# Patient Record
Sex: FEMALE | Race: BLACK OR AFRICAN AMERICAN | NOT HISPANIC OR LATINO | Employment: PART TIME | URBAN - METROPOLITAN AREA
[De-identification: names, ages, dates, MRNs, and addresses within clinical notes are randomized per-mention and may not be internally consistent; named-entity substitution may affect disease eponyms.]

---
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
## 2021-06-27 ENCOUNTER — HEALTH MAINTENANCE LETTER (OUTPATIENT)
Age: 21
End: 2021-06-27

## 2021-07-05 ENCOUNTER — HOSPITAL ENCOUNTER (EMERGENCY)
Dept: EMERGENCY MEDICINE | Facility: HOSPITAL | Age: 21
Discharge: HOME | End: 2021-07-05

## 2021-07-05 DIAGNOSIS — J02.9 PHARYNGITIS, UNSPECIFIED ETIOLOGY: ICD-10-CM

## 2021-07-05 LAB
DEPRECATED S PYO AG THROAT QL EIA: NORMAL
GROUP A STREP BY PCR: NORMAL
MONOCYTES NFR BLD AUTO: NEGATIVE %

## 2021-07-05 ASSESSMENT — MIFFLIN-ST. JEOR: SCORE: 1507.04

## 2021-07-05 NOTE — ED TRIAGE NOTES
ED Triage Notes by Lizet Parsons RN at 7/5/2021 11:29 AM     Author: Lizet Parsons RN Service: -- Author Type: Registered Nurse    Filed: 7/5/2021 11:32 AM Date of Service: 7/5/2021 11:29 AM Status: Signed    : Lizet Parsons RN (Registered Nurse)       Patient presents here with tonsillar swelling that has occurred over the past 6 days. She was seen on Meghan 10 for another episode of swelling, and the results of her throat swab were lost. Inspection of the orophrynx reveal swollen tonsils, but are not touching. Airway opening is about 2cm.

## 2021-07-06 VITALS — HEIGHT: 64 IN | WEIGHT: 168 LBS | BODY MASS INDEX: 28.68 KG/M2

## 2021-07-06 LAB
C TRACH DNA SPEC QL NAA+PROBE: POSITIVE
N GONORRHOEA DNA SPEC QL NAA+PROBE: POSITIVE
SPEC DESCRIPTION: ABNORMAL
SPECIMEN DESCRIPTION: ABNORMAL

## 2021-07-06 NOTE — ED PROVIDER NOTES
ED Provider Notes by Tamar Díaz PA-C at 7/5/2021  1:31 PM     Author: Tamar Díaz PA-C Service: Emergency Medicine Author Type: Physician Assistant    Filed: 7/5/2021 10:30 PM Date of Service: 7/5/2021  1:31 PM Status: Signed    : Tamar Díaz PA-C (Physician Assistant)       EMERGENCY DEPARTMENT ENCOUNTER      NAME: Sudarshan Lane  AGE: 21 y.o. female  YOB: 2000  MRN: [id number]  EVALUATION DATE & TIME: 7/5/2021 12:45 PM    PCP: Provider, No Primary Care    ED PROVIDER: Tamar Díaz PA-C      Chief Complaint   Patient presents with   ? Sore Throat         FINAL IMPRESSION:  1. Pharyngitis, unspecified etiology          MEDICAL DECISION MAKING:    Pertinent Labs & Imaging studies reviewed. (See chart for details)  21 y.o. female presents to the Emergency Department for evaluation of sore throat x 6 days. Was seen in ED last month for similar symptoms. Was placed on amoxicillin, strep testing was lost by lab. Reports it was better until symptoms return almost one week ago. She denies any fevers, chills, vomiting, diarrhea, headache, change in chronic smokers cough. Denies concern for or history of STIs.    Vitals reviewed and unremarkable. Patient is non-toxic appearing and in no acute distress. On physical exam, lungs are CTAB. Posterior pharynx with 3+ and symmetric tonsils that are erythematous with cobblestone appearance. Uvula midline, no exudates, no submandibular tenderness or edema, no trismus. Tolerating own secretions without difficulty. Differential diagnosis includes but not limited to viral URI, mono, strep throat, chlamydia/gonococcal pharyngitis.     Rapid strep negative. Mono negative. Gonorrhea and chlamydia PCR pending from posterior pharynx given re-occurrence with negative strep and mono testing. With shared decision making, will not empirically treat. She was given solu-medrol with improvement. She is tolerating oral intake without difficulty.  No physical exam findings to suggest epiglottitis, PTA or RPA. Would not prescribe antibiotics at this time with suspect viral infection. Discussed return precautions and ENT follow up given re-occurring symptoms. Patient expressed understanding and discharged home in stable condition.     0 minutes of critical care time     ED COURSE  12:52PM I met with the patient, obtained history, performed an initial exam, and discussed options and plan for diagnostics and treatment here in the ED. PPE: Provider wore gloves and surgical mask.   2:07 PM Discussed results. Patient discharged after being provided with extensive anticipatory guidance and given return precautions, importance of PCP follow-up emphasized.    At the conclusion of the encounter I discussed the results of all of the tests and the disposition. The questions were answered. The patient acknowledged understanding and was agreeable with the care plan.     MEDICATIONS GIVEN IN THE EMERGENCY:  Medications   methylPREDNISolone sod suc(PF) injection 125 mg (Solu-MEDROL) (125 mg Intramuscular Given 7/5/21 1329)       NEW PRESCRIPTIONS STARTED AT TODAY'S ER VISIT  There are no discharge medications for this patient.         =================================================================    HPI    Patient information was obtained from: patient    Use of Interpretor: N/A         Sudarshan Lane is a 21 y.o. female with no pertinent history who presents to this ED as a walk in by self for evaluation of sore throat.    Per chart review, patient was seen at this ED on 6/10/21 for sore throat and diagnosed with pharyngitis. Rapid strep was obtained but lost by the lab. She was given 10 mg Decadron in the ED and discharged with a prescription for a 10-day course of amoxicillin.    Patient was evaluated here in June for similar symptoms. She was prescribed antibiotics and symptoms eventually improved. However, it has returned and she reports 6 days of sore  throat and tonsillar redness/swelling. She has been taking ibuprofen at home without relief. Reports chronic smokers cough that is unchanged. Patient is otherwise healthy and denies fever, chills, nausea, vomiting, diarrhea, or any other complaints at this time. No concerns for STIs. Denies chance of pregnancy and is currently on her menstrual cycle.     REVIEW OF SYSTEMS   Review of Systems   Constitutional: Negative for chills and fever.   HENT: Positive for sore throat. Negative for drooling, trouble swallowing and voice change.         Positive for tonsillar swelling and redness (bilaterally).   Respiratory: Positive for cough (chronic). Negative for shortness of breath.    Cardiovascular: Negative for chest pain.   Gastrointestinal: Negative for diarrhea, nausea and vomiting.   Skin: Negative for rash.   All other systems reviewed and are negative.         PAST MEDICAL HISTORY:  History reviewed. No pertinent past medical history.    PAST SURGICAL HISTORY:  History reviewed. No pertinent surgical history.        CURRENT MEDICATIONS:    No current facility-administered medications on file prior to encounter.      No current outpatient medications on file prior to encounter.       ALLERGIES:  No Known Allergies    FAMILY HISTORY:  History reviewed. No pertinent family history.    SOCIAL HISTORY:   Social History     Socioeconomic History   ? Marital status: Single     Spouse name: None   ? Number of children: None   ? Years of education: None   ? Highest education level: None   Occupational History   ? None   Social Needs   ? Financial resource strain: None   ? Food insecurity     Worry: None     Inability: None   ? Transportation needs     Medical: None     Non-medical: None   Tobacco Use   ? Smoking status: None   Substance and Sexual Activity   ? Alcohol use: None   ? Drug use: None   ? Sexual activity: None   Lifestyle   ? Physical activity     Days per week: None     Minutes per session: None   ? Stress:  "None   Relationships   ? Social connections     Talks on phone: None     Gets together: None     Attends [employer] service: None     Active member of club or organization: None     Attends meetings of clubs or organizations: None     Relationship status: None   ? Intimate partner violence     Fear of current or ex partner: None     Emotionally abused: None     Physically abused: None     Forced sexual activity: None   Other Topics Concern   ? None   Social History Narrative   ? None       VITALS:  Patient Vitals for the past 24 hrs:   BP Temp Temp src Pulse Resp SpO2 Height Weight   07/05/21 1127 109/68 98.5  F (36.9  C) Oral 87 16 99 % 5' 4\" (1.626 m) 168 lb (76.2 kg)       PHYSICAL EXAM    Constitutional: Well developed, Well nourished, NAD   HENT: Normocephalic, Atraumatic, Bilateral external ears normal, 3+ tonsillar edema bilaterally with cobblestone appearance, uvula midline, no exudates, no dysphonia, tolerating own secretions without difficulty, mucous membranes moist, No submandibular edema or tenderness. Nose normal.   Neck- Normal range of motion, No tenderness, Supple, No stridor. Bilateral tonsillar lymphadenopathy.   Eyes: Conjunctiva normal, No discharge.   Respiratory: Normal breath sounds, No respiratory distress, No wheezing, Speaks full sentences easily. No cough.   Cardiovascular: Normal heart rate, Regular rhythm, No murmurs, No rubs, No gallops.  GI: Soft, No tenderness, No masses, No flank tenderness. No rebound or guarding.    Musculoskeletal: 2+ DP pulses. No edema. No cyanosis, No clubbing. Moves all extremities spontaneously.  Integument: Warm, Dry, No erythema, No rash. No petechiae.    Neurologic: Alert & oriented x 3, Normal motor function, Normal sensory function, No focal deficits noted. Normal gait.    Psychiatric: Affect normal, Judgment normal, Mood normal. Cooperative.     LAB:  All pertinent labs reviewed and interpreted.  Results for orders placed or performed during the " hospital encounter of 07/05/21   Rapid Strep A Screen-Throat    Specimen: Throat   Result Value Ref Range    Rapid Strep A Antigen No Group A Strep detected, presumptive negative No Group A Strep detected, presumptive negative   Group A Strep PCR Throat Swab    Specimen: Throat   Result Value Ref Range    Group Strep A by PCR No Group A Strep detected No Group A Strep detected, Invalid, ERROR   Mononucleosis Screen   Result Value Ref Range    Mono Screen Negative Negative       IJalyn, am serving as a scribe to document services personally performed by Tamar Díaz PA-C based on my observation and the provider's statements to me. I, Tamar Díaz PA-C attest that Jalyn Pickard is acting in a scribe capacity, has observed my performance of the services and has documented them in accordance with my direction.    Tamar Díaz PA-C  Emergency Medicine  [facility]     Tamar Díaz PA-C  07/05/21 9078

## 2021-07-08 RX ORDER — DOXYCYCLINE 100 MG/1
100 CAPSULE ORAL 2 TIMES DAILY
Qty: 20 CAPSULE | Refills: 0 | Status: SHIPPED | OUTPATIENT
Start: 2021-07-08 | End: 2021-07-18

## 2021-10-17 ENCOUNTER — HEALTH MAINTENANCE LETTER (OUTPATIENT)
Age: 21
End: 2021-10-17

## 2022-03-07 ENCOUNTER — OFFICE VISIT (OUTPATIENT)
Dept: MIDWIFE SERVICES | Facility: CLINIC | Age: 22
End: 2022-03-07
Payer: COMMERCIAL

## 2022-03-07 VITALS
HEIGHT: 63 IN | WEIGHT: 174 LBS | SYSTOLIC BLOOD PRESSURE: 116 MMHG | HEART RATE: 72 BPM | DIASTOLIC BLOOD PRESSURE: 64 MMHG | BODY MASS INDEX: 30.83 KG/M2

## 2022-03-07 DIAGNOSIS — Z32.01 PREGNANCY EXAMINATION OR TEST, POSITIVE RESULT: Primary | ICD-10-CM

## 2022-03-07 DIAGNOSIS — N92.6 MISSED MENSES: ICD-10-CM

## 2022-03-07 LAB — HCG UR QL: POSITIVE

## 2022-03-07 PROCEDURE — 99203 OFFICE O/P NEW LOW 30 MIN: CPT | Performed by: ADVANCED PRACTICE MIDWIFE

## 2022-03-07 PROCEDURE — 81025 URINE PREGNANCY TEST: CPT | Performed by: ADVANCED PRACTICE MIDWIFE

## 2022-03-07 RX ORDER — PRENATAL VIT/IRON FUM/FOLIC AC 27MG-0.8MG
1 TABLET ORAL DAILY
Qty: 90 TABLET | Refills: 3 | Status: SHIPPED | OUTPATIENT
Start: 2022-03-07 | End: 2022-11-17

## 2022-03-07 NOTE — PROGRESS NOTES
Pregnancy Confirmation Visit:     Assessment:   Pregnancy confirmation  , 6w2d gestation by known LMP  Former cigarette smoker, quit with positive UPT    Plan:   1.) Discussed maternity care options at [facility]- philosophy, care models, and locations.  Patient desires to follow with the nurse midwives.  2.) Medical, surgical, family and obstetrical history reviewed.   3.) Anticipatory guidance given re: first trimester discomforts and relief measures. Nutrition principles in early pregnancy briefly discussed. Encouraged PNV with folic acid, vitamin D, and DHA supplements. First trimester warning signs reviewed.   4.) Dating ultrasound discussed. Accepts referral to [facility] to confirm dating.   5.) Pt encouraged to follow-up for IOB visit between 10 and 12 weeks.   6.) Recommended patient lift no more than 25 pounds.    40 minutes spent on the date of the encounter doing chart review, review of test results, patient visit and documentation     Subjective:   Sudarshan Fierro is a 22 year old, , here for pregnancy confirmation visit.  She is excited to be pregnant.  She and her partner were not necessarily trying but they were not preventing either. UPT was positive at home on 22.Patient's last menstrual period was 2022 (exact date). Last period was normal. Has had regular cycles [id number]. Current symptoms also include: nausea.   Questions today regarding lifting restrictions at work.   She currently lives with her sister and her niece.  Her partner is on duty [place]  She feels safe in her relationship.  When he returns from base they will plan to look for an apartment together.  Has a gun and she keeps it locked.   She recently quit smoking cigarettes.  Praised for these efforts!    Review of Systems  Denies bleeding, pain or cramping, abnormal vaginal discharge or dysuria.    Past Medical History:   Diagnosis Date     Chlamydia      Gonorrhea   "      Past Surgical History:   Procedure Laterality Date     MOUTH SURGERY         OB History    Para Term  AB Living   1 0 0 0 0 0   SAB IAB Ectopic Multiple Live Births   0 0 0 0 0      # Outcome Date GA Lbr Johnathan/2nd Weight Sex Delivery Anes PTL Lv   1 Current                Family History   Problem Relation Age of Onset     Diabetes Mother      Early Death Mother      Schizophrenia Mother      Diabetes Sister      Early Death Sister         Objective:    /64 (BP Location: Right arm, Patient Position: Sitting, Cuff Size: Adult Regular)   Pulse 72   Ht 1.607 m (5' 3.25\")   Wt 78.9 kg (174 lb)   LMP 2022 (Exact Date)   Breastfeeding No   BMI 30.58 kg/m     General: alert and no acute distress    Physical exam deferred until IOB visit    Lab Review  Urine HCG: positive    Re Nayak CNM, MARVIN, CAROLE  3/7/2022   10:08 AM                       "

## 2022-03-07 NOTE — PATIENT INSTRUCTIONS
"Welcome to [facility]   and thank you for choosing us for your maternity care provider!  Congratulations!    Meet the Midwives from [facility]  You are invited to an informational meet and greet with [facility]'s [facility] Certified Nurse-Midwives. Our free \"Meet the Midwives\" event is a great opportunity to learn about our midwives' philosophy and experience, the hospitals where we can assist with your birth, and answer questions you may have. Partners, friends, and family are welcome to attend. Currently, this is a virtual event.  Date  First Tuesday of every month at 7 pm.    Link to next (live) meeting  [email or web address]  To Join by Telephone (audio only) Call:   [phone] Phone Conference ID: [phone]#    Contact information:  Appointment line and to get a hold of CNM in clinic Monday-Friday 8 am - 5 pm:  [phone].  There are some clinics with early start times (1st appointment 7:40 am) and others with evening hours (last appointment 6:20 pm).  Most are typically open from 8 am to 5 pm.    CNM on call answering service: [phone].  Specify your hospital of choice and leave a brief message for CNM;  will then page CNM who is on call at your specified hospital and you should receive a call back with 15 minutes.  Be sure that your ringer is audible and that you can accept blocked calls so that we can get back in touch with you! This number should be reserved for urgent needs if during the day, before 8 am, after 5 pm, weekends, holidays.      Pregnancy: Body Changes  From conception (fertilization) until after the birth of your child, you and your baby will change every day. To help you understand what is happening, we ve outlined how pregnancy begins and some of the changes you may notice.  How Pregnancy Begins  Conception is the union of a sperm and an egg. " When it occurs, your baby s genetic makeup is complete, even its sex. Fertilization takes place in the fallopian tube. The fertilized egg then travels down this tube to the uterus (womb). The egg attaches to the lining of the uterus about a week later. There it grows and is nourished.    Your Changing Body  Pregnancy affects almost every part of your body. You may notice some of the following physical and emotional changes:    Your uterus expands outward and upward as your baby grows. You may feel pressure on your bladder, stomach, and other organs.    You may notice skin color changes on your forehead, nose, and cheeks. A dark line may form from your bellybutton down to your pubic area. The skin color around your nipples and thighs may also change.    Pink stretch marks may appear on your abdomen, breasts, or hips.    Your hair may seem thicker. You lose less hair during pregnancy.    You may feel fine one day and weepy the next. This is caused by changes in your body, such as increased hormones (chemicals that affect the function of certain organs and also your moods).      Adapting to Pregnancy: First Trimester  As your body adjusts, you may have to change or limit your daily activities. You ll need more rest. You may also need to use the energy you have more wisely.  Eat stomach-friendly foods like cottage cheese, crackers, or bread throughout the day.    Your Changing Body  Almost every part of your body is affected as you adapt to pregnancy. The uterus and cervix will begin to soften right away. You may not look very pregnant during the first three months. But you are likely to have some common signs of early pregnancy:    Nausea    Fatigue    Frequent urination    Mood swings    Bloating of the abdomen    Missed or light periods (first trimester bleeding)    Nipple or breast tenderness, breast swelling      It s Not Too Late to Start Good Habits  What matters most is protecting your baby from this moment on.  If you smoke, drink alcohol, or use drugs, now is the time to stop. If you need help, talk with your health care provider.    Smoking increases the risk of stillbirth or having a low-birth-weight baby. If you smoke, quit now.    Alcohol and drugs have been linked with miscarriage, birth defects, intellectual disability, and low birth weight. Do not drink alcohol or take drugs.    Tips to Relieve Nausea  Although nausea can occur at any time of the day, it may be worse in the morning. To help prevent nausea:    Eat small, light meals at frequent intervals.    Get up slowly. Eat a few unsalted crackers before you get out of bed.    Drink water with lemon slices.    Eat an ice pop in your favorite flavor.    Ask your health care provider about taking stew or vitamin B6 for nausea and vomiting.    Talk with your health care provider if you take vitamins that upset your stomach.    Work Concerns  The end of the first trimester is a good time to discuss working during pregnancy with your employer. Follow your health care provider s advice if your job requires you to stand for a long time, work with hazardous tools, or even sit at a desk all day. Your workspace, workload, or scheduled hours may need to be adjusted. Perhaps you can change body postures more often or take an extra break.  Advice for Travel  Talk to your health care provider first, but the second trimester may be the best time for any travel. You may be advised to avoid certain trips while you re pregnant. Food and water can be concerns in developing countries. Travel by car is a good choice, as you can stop, get out, and stretch. Bring snacks and water along. Fasten the lap belt below your belly, low over your hips. Also be sure to wear the shoulder harness.  Intimacy  Unless your health care provider tells you to, there is no reason to stop having sex while you re pregnant. You or your partner may notice changes in desire. Desire may be less in the first  trimester, due to nausea and fatigue. In the second trimester, sex may be very enjoyable. The third trimester can be a challenge comfort-wise. Try different positions and see what s best for you both.      Pregnancy: Your First Trimester Changes  The first trimester is a time of rapid development for your baby. Because your baby is growing so quickly, it is important that you start a healthy lifestyle right away. By the end of the first trimester, your baby has formed all of its major body organs and weighs just over an ounce.    Month 1 (Weeks 1-4)  The placenta (the organ that nourishes your baby) begins to form. The heart and lungs begin to develop. Your baby is about 1/4 inch long by the end of the first month.    Month 2 (Weeks 5-8)  All of your baby s major body organs form. The face, fingers, toes, ears, and eyes appear. By the end of the month, your baby is about 1 inch long.    Month 3 (Weeks 9-12)  Your baby can open and close its fists and mouth. The sexual organs begin to form. As the first trimester ends, your baby is about 4 inches long.      Pregnancy: Your Weight  Being a healthy weight is important for both you and your baby. The weight you gain now is not just extra fat. It is also the weight of your baby. And it is the increased blood and fluids to support the baby. A slow, steady rate of gain is best. How much you should gain depends on your weight before getting pregnant. Check with your health care provider to find out what is right for you.    If You Gain Too Much  Gaining too much weight might cause you to feel tired or you could have a harder pregnancy or birth. If you and your health care provider decide you re gaining too much:    Eat fewer fats and sugars. Instead, eat fruit, vegetables, and whole-grain foods.    Drink plenty of water between meals.    Get at least 20 minutes of light exercise, such as walking, each day.    Don t diet. You might not get enough of the nutrients you or your  baby needs.    Keep a diet diary to help you gauge what and how much you are eating .    If You re Not Gaining Enough  If you don t gain enough, your baby could be too small or have health problems. Women tend to gain most of their weight in the second and third trimesters. For now:    Eat many types of foods. Make sure you get enough calcium, protein, and carbohydrates.    Don t skip meals.    Eat healthy snacks.    Pick nutrient-dense, high calorie healthy food like trail mix or protein shakes.    See a dietitian for help.    Talk to your healthcare provider if you have had an eating disorder or problems with certain foods.      Pregnancy: Common Questions  There are plenty of myths and  old wives  tales  surrounding pregnancy. You may need help  fact from fiction. On this sheet, you ll find answers to a few common questions. If you have other questions, talk with a midwife.    Will Working Harm My Baby?  In most cases, working throughout your pregnancy is not harmful at all. There may be concerns if the job involves dangerous machinery or chemicals, lifting, or standing for very long periods of time. Talk to your health care provider and employer about your particular job and pregnancy.  Why Can t I Change the Cat Litter Box?  Cats carry a disease called toxoplasmosis. In adult humans, it shows up as a mild infection of the blood and organs. If you are infected during pregnancy, the baby s brain and eyes could be damaged. To be safe, have someone else change the litter. If you must handle it, wear a paper mask over your nose and mouth. Also, wear gloves and wash your hands afterward.  Which Medications Are Safe?  No prescription or over-the-counter drug is safe for everyone all of the time. But sometimes medications are needed. Be sure your health care provider knows you are pregnant. Then use only the medications he or she advises you to take. Please refer to the below resources for further information  and discuss concern and questions with your midwife.  Is It True That I Can Overheat My Baby?  Yes. To avoid making your baby too warm:    Don t sit in a jacuzzi. A long, warm bath is fine, but not in water over 100 F.    Exercise less intensely if you feel fatigued. Base your workout on how you feel, not your heart rate. Heart rates aren t a good way to measure effort during pregnancy.  Can I Lift and Carry Safely?  Yes, if your health care provider doesn t tell you otherwise. Learn to lift and carry safely to avoid injury and reduce back pain during pregnancy. To protect your back:    Bend at the knees to bring the load nearer.    Get a good . Test the weight of the load.    Tighten your abdomen. Exhale as you lift.    Lift with your legs, not with your back.    Carry the load close to your body.    Hold the load so you can see where you are going.  What If I Get Sick?  Most women get sick at least once during pregnancy. Talk with your health care provider if you do. Most likely it will not affect your pregnancy. Get plenty of rest and fluids, and eat what you can. Talk to your health care provider before taking any medications.        HEALTHY PREGNANCY CARE: 10-14 WEEKS PREGNANT     By weeks 10 to 14 of your pregnancy, the placenta has formed inside your uterus. It may be possible to hear your baby's heartbeat with a doppler ultrasound device. Your baby's eyes can and do move. The arms and legs can bend.    GENETIC SCREENING OPTIONS AT [facility]                All testing is optional. We don t recommend or discourage any test; it is totally up to you and your partner. Some couples wish to know their risk of having a baby with a genetic defect and others do not. We will support your decision. Abnormal results may lead to a discussion of options for further testing.    Accurate dating of your pregnancy is important for all testing so an ultrasound may be done prior to referral or testing.    No testing  provides certainty; there are false positives and negatives associated with all testing, some more than others.    Most genetic testing is non-invasive (requires only a blood sample and sometimes an ultrasound or both).    It is always wise to check with your insurance carrier before proceeding.    Some testing can be done at our lab and some require a referral.    If you decide to do no testing, the 20 week ultrasound scan, which is a routine or standard ultrasound, has some ability to detect abnormalities in the baby, and identifies obstetric problems.    If you are over 35, you will have the option of a Level II ultrasound, which is a more detailed and targeting scan, that helps detect fetal anomalies as well as obstetric problems.      If you have a more complex family history of chromosomal abnormalities, a referral to a genetic counselor and/or a Maternal Fetal Medicine specialist, to help identify available tests, may be recommended.    TYPES OF GENETIC TESTING AVAILABLE INCLUDE:    Carrier Screening/Testing for Genetic Conditions    There are many inherited conditions for which testing or carrier testing is available. Carrier screening can test for conditions like Cystic Fibrosis, Thalassemia, Sebastian Sachs, Sickle Cell, Hemophilia, Muscular Dystrophy, Kem s disease and many others.     Cystic Fibrosis (CF) affects both males and females and people from all racial and ethnic groups. However, the disease is most common among Caucasians of Northern  descent. CF is also common among Latinos and American Indians. The disease is less common among  Americans and  Americans. More than 10 million Americans are carriers of a faulty CF gene and many of them don't know that they are CF carriers. One or both parents can be tested any time before or during pregnancy.    Talk to your care provider about your family history and whether you should be screened. A referral to a genetic counselor at  [facility] or [facility] can be made by your care provider at any time.    This is also tested for in the  Metabolic Screen that your infant receives 24 hours after birth.     Fetal DNA cell Testing: Atkins or Innatal (Non-Invasive Prenatal Testing)    At 10 wk or greater, a blood sample can be drawn here at clinic or at any of our referral offices. It will provide highly accurate results with low false positive rates for trisomy 18, trisomy 21 (Down Syndrome), and trisomy 13 (> 99% trisomy detection rate at a false positive rate of <0.1%). Gender identification can also be obtained if desired (98% accuracy).  Can also detect some sex-linked chromosomal abnormalities (80-90% accuracy).  This is often done if one of the other screening tests is abnormal.        1st Trimester Screening:     Everyone has an age related risk of having a baby with a genetic abnormality. This testing provides a risk profile which is better than assigning risk based solely on your age.    Refines your risk of having a baby with a chromosomal abnormality such as Trisomy 21 & 18.    This test with detect a fetus with one of these disorders about 85% of the time.    A thickened nuchal fold can also be associated with cardiac defects.    This test requires a blood collection combined with ultrasound to obtain a measurement of fluid at back of baby s neck (nuchal translucency).    False positive results occur approximately 5% of cases.    An additional blood sample may be necessary in order to calculate your risk of having a baby with a neural tube defect (e.g. spina bifida).  This is called the AFP test (alpha fetal protein).  An ultrasound should be able to  any spinal defect as well.    Follow-up of an abnormal test may include a more extensive ultrasound study and you may be offered an amniocentesis (a small sample of amniotic fluid is withdrawn and studied) for a definitive diagnosis    Quad Screen  (4-marker screen)    Between 15 and 21 weeks, a sample of blood can be drawn at our lab to assess your risk of having a baby with Down Syndrome, Trisomy 18 and neural tube defects. Such testing is able to detect these conditions in 80% of cases and the false-positive rate is approximately 5%.     Follow-up of an abnormal test may include a more extensive ultrasound study and you may be offered an amniocentesis (a small sample of amniotic fluid is withdrawn and studied) for a definitive diagnosis      Referrals opportunities include:    [facility],  [facility], [facility]  o Offers nuchal translucency ultrasound; does not offer genetic counseling.    [facility] and [facility] ([facility]):   o Approximately an hour long visit includes 30 min with genetic counselor who discusses all testing available and which ones might be beneficial to you based on age, personal and family history.    o Blood will be drawn and the nuchal translucency ultrasound will be discussed and performed if desired. The Free Fetal DNA testing ([place]/Verifi) can be drawn also.  o Targeted or detailed Level II ultrasounds are also available with these perinatology groups.        Breastfeeding: a Healthy Option for You and Your Baby  Consider breastfeeding for the healthiest way to feed your baby. Ask your midwife or physician for more information.     The choice of how you will feed your baby is important.  Before your baby s birth, you ll want to learn about the benefits of breastfeeding.  [facility] have been designated Baby Friendly; an initiative that was created by the World Health Organization and UNICEF.  This helps give you and your baby the best start in feeding their baby.    Why should I breastfeed my baby?    Babies are less likely to develop childhood obesity or diabetes    Babies are less likely to suffer from recurrent ear infections    Babies are less likely  to be hospitalized for respiratory conditions    Breast milk is rich in nutrients and antibodies-it is easy to digest    How does it benefit me?    Lowers the risk for diabetes, breast and ovarian cancer and postpartum depression    Moms can lose  baby weight  more quickly    Cost savings - formula can cost well over $1,500 per year    Convenient - no bottles and nipples to sterilize, no measuring and mixing formula    The physical contact with breastfeeding can make babies feel secure, warm and comforted     What about formula?  While you and your baby are staying with us at [facility], we will support whatever feeding choice you make for your baby.    Some important considerations:      The American Academy of Pediatrics, the World Health Organization, and many more organizations recommend exclusive breastfeeding for 6 months and continued breastfeeding while adding other foods for the first 1-2 years.      Any amount of breastmilk has benefits to both baby and mother.    Giving formula in replacement of breastfeeding can affect mother s milk supply.  If formula is needed, hospital staff will work with you on a plan to help develop your milk supply.    Formula alters the natural growth of good bacteria in the  stomach.     Research has found that first time mothers who offer formula in the hospital have a shorter duration of breastfeeding.    How can I start to prepare?     Start by having a conversation with your medical provider.     Talk with your partner, family and friends.     Attend a prenatal class that includes breastfeeding preparation. Birth and breastfeeding classes are offered by [facility]. Visit [place] for class information.     After your baby s birth, hospital staff and lactation consultants will help you and your baby get off to a great start with breastfeeding.    As your center of gravity and weight changes, use good body mechanics when changing positions and  lifting. For example, use a straight back and your legs for support when lifting instead of bending over. Maintain good posture to prevent straining your muscles. Now is a good time to continue or restart your exercise program. Walking 30-60 minutes daily is an excellent way to keep fit. Yoga and swimming also offer many benefits.    The nausea and fatigue of early pregnancy have usually started to let up, so this is a good time to focus on nutrition. Consider attending a nutrition class. A healthy diet includes about 60 grams of protein each day (3-4 servings of dairy, 2-3 servings of meat/fish/poultry/nuts), 4-6 servings of whole grain foods, and 5-6 servings of fruits and vegetables. Remember to drink 6-8 glasses of water daily.    Watch for warning signs, such as     vaginal bleeding    fluid leaking from your vagina    severe abdominal pain    nausea and vomiting more than 4-5 times a day, or if you are unable to keep anything down    fever more than 100.4 degrees F.       RESOURCES   You can refer to the Starting Out Right book or find it online at [email or web address] or [email or web address]/images/[email or web address]/[email or web address]-out-[email or web address]-starting-out-[email or web address]#[email or web address]    You can sign up for a weekly parenting e-mail that gives support, tips and advice from health care professionals that starts with pregnancy and continues through the toddler years. To register, go to [email or web address] at any time during your pregnancy.    Breastfeeding:    OUTPATIENT LACTATION RESOURCES     -Schedule an appointment with a [facility] Nurse [facility]wives [facility] who is also a Lactation Consultant by calling [phone]. We see women for breastfeeding visits at [facility] and [facility].     -Baby Café    Pregnant and interested in breastfeeding?  Need answers to breastfeeding questions?  Want to help  breastfeeding moms?  Already breastfeeding and want to meet other moms?    Join us at the Baby Café!    Baby Cafe is a free, drop-in service offering breast-feeding support for pregnant women, breast-feeding mothers and their families.  Come share tips and socialize with other mothers.  Babies and siblings are welcome (no childcare available).    Starting April 2018, Baby Café will be at 4 locations.  Please see below for the Baby Café closest to you! Hmong, Italian, and Serbian which is may be available at some sites.      [facility]  [address] [place] [address]  1st Wednesday: 10am-12pm    [facility]  [address] [place] [address]  3rd Wednesday 4-6pm    [facility]  [address] [place] [address]  4th Wednesday 10am-12:30pm    [facility]  [address] [place] [address]  4th Wednesdays: 4-6pm    -Attend a baby weigh in at [facility].  Lactation consultants are available to answer questions  [place]: Tuesdays 1:00 - 2:00  [facility]: Mondays 1:00 - 2:00   [email or web address]    -Attend one of the New Mama groups at [facility] in [facility].  [facility] also offers one-on-one in home and in office lactation consults.   [email or web address]    -Attend a BasiliaLecsarah League meeting.  Multiple groups in several locations throughout the [facility]. The meetings are no-cost and always informative breastfeeding education session through [employer]  [email or web address]     Held at [facility] the second Thursday of each month at 7pm    Childbirth and Parenting Education:       Everyday Miracles:   [email or web address].everyday-miracles.[email or web address]    Free Video Series from [facility]: https://[email or web address].[facility]/academics/specialty-areas/nurse-midwifery/having-baby-prenatal-videos/having-baby-prenatal-and    [facility]: http://[employer]/   (530)  926-BABY  [place]: (education, yoga & wellness) [email or web address]  Enlightened Mama: [email or web address]   Childbirth collective: (Parent topic nights)  www.childbirthcollective.org/  Hypnobabies:  www.hypnobabiestwincities.com/  Hypnobirthing:  Http://hypnobirthing.com/  The Birth Hour: https://[employer]/online-childbirth-class/    APPS and Podcasts:   [employer]    Evidence Based Birth  The Birth Hour (for birth stories)   Birthful   Expectful   The Longest Shortest Time  PregnancyPodcast Nehal Shankar    Book Recommendations:   Peyton Smithland's Birthing From [facility]--first few chapters include a new-age tone, you may prefer to skip it and keep going, because there is good stuff later.  This book recommendation covers emotional preparation, but does cover coping with pain, and use of both pharmacological and nonpharmacological methods.    Dr. Garza' The Pregnancy Book and The Birth Book--the pregnancy book goes month-by month      The Birth Partner by Maru Kimbrough    Womanly Art of Breastfeeding by [employer]he [employer]   Bestfeeding by Karen Bailey--great pictures    Mothering Your Nursing Toddler, by Giulia Gomez.   Addresses dealing with so many of the challenging behaviors of a nursing toddler.  How Weaning Happens, by La Leche League.  Discusses weaning at all ages, from medically necessary weaning of an infant, all the way up to age 5 (or older), with why/why not, and strategies.  Very empowering book both for deciding to wean and deciding not to.    American College of Nurse-Midwives (ACNM) http://www.midwife.org/; look at the informational handouts at http://www.midwife.org/Share-With-Women     www.mymidwife.org    Mother to Baby (Medication and Herbal guidance in pregnancy): http://www.mothertobaby.org  Toll-Free Hotline: 163.522.2124  LactMed (Medication use while breastfeeding): http://toxnet.nlm.nih.gov/newtoxnet/lactmed.htm    Women's  "Health.gov:  http://www.womenshealth.gov/a-z-topics/index.html    American pregnancy association - http://americanpregnancy.org    Centering Pregnancy (group prenatal care option): http://centeringhealthcare.org    Information about doulas:  Childbirth collective: http://www.childbirthcollective.org/  [facility] [place] (JONAS):  www.jonas.org  [facility]  project: http://AutoRealtycitiesdoulaproject.DinersGroup/     Early Childhood and Family Education (ECFE):  ECFE offers parents hands-on learning experiences that will nourish a lifetime of teachable moments.  http://ecfe.info/ecfe-home/    March of Dimes www.Trifacta     FDA - Nutrition  www.mypyramid.gov  Under \"For Consumers,\" click on \"pregnant and breastfeeding women.\"      Centers for Disease Control and Prevention (CDC) - Vaccines : http://www.cdc.gov/vaccines/       When researching information on the web, question the validity of websites.  The Clinverse .gov, .FileHold Document Management software and.org tend to be more reliable information.  If there are a lot of advertisements, be cautious of the information provided. Stay away from blogs and chat rooms please!      Nutrition & supplements:     Prenatal vitamin (those with 600-1000 mcg folic acid and 27 mg of iron are enough).  Take with food or Juice     4-5 servings of dairy or other calcium rich foods (fish, leafy greens, soy) per day - if not, take 500-1000 mg additional calcium (Tums, pills, chews). Take with dairy     Vitamin D3 0094-6752 IU geltab daily.  Take with fattiest meal.  Look for fortified foods also (Dairy, Juice)     2-3 (4) oz servings of fish, seafood, nuts (walnuts & almonds), oils, avocado per week - if not, take Omega 3 Fatty acids: DHA & TRENT 2123-7134 mg per day.  Other names: cod liver oil, fish oil. Take with fattiest meal.  Some prenatals have DHA, but typically not a sufficient dose.    Fish: Do not eat shark, swordfish, nurys mackerel, or tilefish when you are pregnant or breastfeeding.  They contain high " levels of mercury.  Limit white (albacore) tuna to no more than 6 ounces per week. Http://www.fda.gov/downloads/ForConsumers/ConsumerUpdates/PYS668519.pdf         Touring the Maternity Care Center  At this time we are offering a virtual tour of the Maternity Care Centers at both [facility]:   [facility]: https://www.Breathometer.Vital Access/Locations/[facility]/Maternity-Care-Center  [place]:   https://StopandWalk.com/[facility]arch[facility]/the-birthplace/tours  https://www.StopandWalk.com/Locations/[facility]/MaternityCare-Center/#virtual_tour  When in person tours become available, registrations is required. To schedule a tour at either [place] or [facility], please do so online using the following links:  [facility] - [email or web address] [email or web address]/[email or web address]&[email or web address]&[email or web address]&[email or web address]&[email or web address]  [facility] - [email or web address]://www.NLP [email or web address].Tesora/[email or web address]&[email or web address]&[email or web address]&[email or web address]&[email or web address]&sam=112     You are invited to  Meet the [facility]    Virtual:   https://www.[place].org/classes-and-events/meet-the-midwives-from-[facility]    A way to tour the hospital Labor and Delivery unit and meet the midwives in our group was postponed at the start of hospital restrictions following COVID-19. We will resume these when able.    Please call [phone] for ongoing updates.

## 2022-04-03 NOTE — PATIENT INSTRUCTIONS
"Welcome to [facility]   and thank you for choosing us for your maternity care provider!  Congratulations!      Eventohart  After each of your visits you are welcome to check MobileTag for your visit summary including education and links to information relevant to your pregnancy and/or well woman care.   Find the \"Visits\" tab at the top of the page, you will see a list of recent visits and for each visit a for link for \"View After Visit Summary.\" View of your After Visit Summary will allow you to read our recommendations from your visit, review any education provided, and link to websites with useful information.   If you have any questions or difficulty navigating instruMagic, please feel free to contact us and we will do our best to direct you.  Meet the Midwives from [facility]  You are invited to an informational meet and greet with [facility] Certified Nurse-Midwives. Our free \"Meet the Midwives\" event is a great opportunity to learn about our midwives' philosophy and experience, the hospitals where we can assist with your birth, and answer questions you may have. Partners, friends, and family are welcome to attend. Currently, this is a virtual event.  Date  First Tuesday of every month at 7 pm.    Link to next (live) meeting  [email or web address]  To Join by Telephone (audio only) Call:   [phone] Phone Conference ID: [phone]#    Contact information:  Appointment line and to get a hold of CNM in clinic Monday-Friday 8 am - 5 pm:  [phone].  There are some clinics with early start times (1st appointment 7:40 am) and others with evening hours (last appointment 6:20 pm).  Most are typically open from 8 am to 5 pm.    CNM on call answering service: [phone].  Specify your hospital of choice and leave a brief message for CNM;  will then page CNM who is on call " at your specified hospital and you should receive a call back with 15 minutes.  Be sure that your ringer is audible and that you can accept blocked calls so that we can get back in touch with you! This number should be reserved for urgent needs if during the day, before 8 am, after 5 pm, weekends, holidays.      Pregnancy: Body Changes  From conception (fertilization) until after the birth of your child, you and your baby will change every day. To help you understand what is happening, we ve outlined how pregnancy begins and some of the changes you may notice.  How Pregnancy Begins  Conception is the union of a sperm and an egg. When it occurs, your baby s genetic makeup is complete, even its sex. Fertilization takes place in the fallopian tube. The fertilized egg then travels down this tube to the uterus (womb). The egg attaches to the lining of the uterus about a week later. There it grows and is nourished.    Your Changing Body  Pregnancy affects almost every part of your body. You may notice some of the following physical and emotional changes:    Your uterus expands outward and upward as your baby grows. You may feel pressure on your bladder, stomach, and other organs.    You may notice skin color changes on your forehead, nose, and cheeks. A dark line may form from your bellybutton down to your pubic area. The skin color around your nipples and thighs may also change.    Pink stretch marks may appear on your abdomen, breasts, or hips.    Your hair may seem thicker. You lose less hair during pregnancy.    You may feel fine one day and weepy the next. This is caused by changes in your body, such as increased hormones (chemicals that affect the function of certain organs and also your moods).      Adapting to Pregnancy: First Trimester  As your body adjusts, you may have to change or limit your daily activities. You ll need more rest. You may also need to use the energy you have more wisely.  Eat stomach-friendly  foods like cottage cheese, crackers, or bread throughout the day.    Your Changing Body  Almost every part of your body is affected as you adapt to pregnancy. The uterus and cervix will begin to soften right away. You may not look very pregnant during the first three months. But you are likely to have some common signs of early pregnancy:    Nausea    Fatigue    Frequent urination    Mood swings    Bloating of the abdomen    Missed or light periods (first trimester bleeding)    Nipple or breast tenderness, breast swelling      It s Not Too Late to Start Good Habits  What matters most is protecting your baby from this moment on. If you smoke, drink alcohol, or use drugs, now is the time to stop. If you need help, talk with your health care provider.    Smoking increases the risk of stillbirth or having a low-birth-weight baby. If you smoke, quit now.    Alcohol and drugs have been linked with miscarriage, birth defects, intellectual disability, and low birth weight. Do not drink alcohol or take drugs.    Tips to Relieve Nausea  Although nausea can occur at any time of the day, it may be worse in the morning. To help prevent nausea:    Eat small, light meals at frequent intervals.    Get up slowly. Eat a few unsalted crackers before you get out of bed.    Drink water with lemon slices.    Eat an ice pop in your favorite flavor.    Ask your health care provider about taking stew or vitamin B6 for nausea and vomiting.    Talk with your health care provider if you take vitamins that upset your stomach.    Work Concerns  The end of the first trimester is a good time to discuss working during pregnancy with your employer. Follow your health care provider s advice if your job requires you to stand for a long time, work with hazardous tools, or even sit at a desk all day. Your workspace, workload, or scheduled hours may need to be adjusted. Perhaps you can change body postures more often or take an extra break.  Advice for  Travel  Talk to your health care provider first, but the second trimester may be the best time for any travel. You may be advised to avoid certain trips while you re pregnant. Food and water can be concerns in developing countries. Travel by car is a good choice, as you can stop, get out, and stretch. Bring snacks and water along. Fasten the lap belt below your belly, low over your hips. Also be sure to wear the shoulder harness.  Intimacy  Unless your health care provider tells you to, there is no reason to stop having sex while you re pregnant. You or your partner may notice changes in desire. Desire may be less in the first trimester, due to nausea and fatigue. In the second trimester, sex may be very enjoyable. The third trimester can be a challenge comfort-wise. Try different positions and see what s best for you both.      Pregnancy: Your First Trimester Changes  The first trimester is a time of rapid development for your baby. Because your baby is growing so quickly, it is important that you start a healthy lifestyle right away. By the end of the first trimester, your baby has formed all of its major body organs and weighs just over an ounce.    Month 1 (Weeks 1-4)  The placenta (the organ that nourishes your baby) begins to form. The heart and lungs begin to develop. Your baby is about 1/4 inch long by the end of the first month.    Month 2 (Weeks 5-8)  All of your baby s major body organs form. The face, fingers, toes, ears, and eyes appear. By the end of the month, your baby is about 1 inch long.    Month 3 (Weeks 9-12)  Your baby can open and close its fists and mouth. The sexual organs begin to form. As the first trimester ends, your baby is about 4 inches long.      Pregnancy: Your Weight  Being a healthy weight is important for both you and your baby. The weight you gain now is not just extra fat. It is also the weight of your baby. And it is the increased blood and fluids to support the baby. A slow,  steady rate of gain is best. How much you should gain depends on your weight before getting pregnant. Check with your health care provider to find out what is right for you.    If You Gain Too Much  Gaining too much weight might cause you to feel tired or you could have a harder pregnancy or birth. If you and your health care provider decide you re gaining too much:    Eat fewer fats and sugars. Instead, eat fruit, vegetables, and whole-grain foods.    Drink plenty of water between meals.    Get at least 20 minutes of light exercise, such as walking, each day.    Don t diet. You might not get enough of the nutrients you or your baby needs.    Keep a diet diary to help you gauge what and how much you are eating .    If You re Not Gaining Enough  If you don t gain enough, your baby could be too small or have health problems. Women tend to gain most of their weight in the second and third trimesters. For now:    Eat many types of foods. Make sure you get enough calcium, protein, and carbohydrates.    Don t skip meals.    Eat healthy snacks.    Pick nutrient-dense, high calorie healthy food like trail mix or protein shakes.    See a dietitian for help.    Talk to your healthcare provider if you have had an eating disorder or problems with certain foods.      Pregnancy: Common Questions  There are plenty of myths and  old wives  tales  surrounding pregnancy. You may need help  fact from fiction. On this sheet, you ll find answers to a few common questions. If you have other questions, talk with a midwife.    Will Working Harm My Baby?  In most cases, working throughout your pregnancy is not harmful at all. There may be concerns if the job involves dangerous machinery or chemicals, lifting, or standing for very long periods of time. Talk to your health care provider and employer about your particular job and pregnancy.  Why Can t I Change the Cat Litter Box?  Cats carry a disease called toxoplasmosis. In adult  humans, it shows up as a mild infection of the blood and organs. If you are infected during pregnancy, the baby s brain and eyes could be damaged. To be safe, have someone else change the litter. If you must handle it, wear a paper mask over your nose and mouth. Also, wear gloves and wash your hands afterward.  Which Medications Are Safe?  No prescription or over-the-counter drug is safe for everyone all of the time. But sometimes medications are needed. Be sure your health care provider knows you are pregnant. Then use only the medications he or she advises you to take. Please refer to the below resources for further information and discuss concern and questions with your midwife.  Is It True That I Can Overheat My Baby?  Yes. To avoid making your baby too warm:    Don t sit in a jacuzzi. A long, warm bath is fine, but not in water over 100 F.    Exercise less intensely if you feel fatigued. Base your workout on how you feel, not your heart rate. Heart rates aren t a good way to measure effort during pregnancy.  Can I Lift and Carry Safely?  Yes, if your health care provider doesn t tell you otherwise. Learn to lift and carry safely to avoid injury and reduce back pain during pregnancy. To protect your back:    Bend at the knees to bring the load nearer.    Get a good . Test the weight of the load.    Tighten your abdomen. Exhale as you lift.    Lift with your legs, not with your back.    Carry the load close to your body.    Hold the load so you can see where you are going.  What If I Get Sick?  Most women get sick at least once during pregnancy. Talk with your health care provider if you do. Most likely it will not affect your pregnancy. Get plenty of rest and fluids, and eat what you can. Talk to your health care provider before taking any medications.        HEALTHY PREGNANCY CARE: 10-14 WEEKS PREGNANT     By weeks 10 to 14 of your pregnancy, the placenta has formed inside your uterus. It may be possible to  hear your baby's heartbeat with a doppler ultrasound device. Your baby's eyes can and do move. The arms and legs can bend.    GENETIC SCREENING OPTIONS AT [facility]                All testing is optional. We don t recommend or discourage any test; it is totally up to you and your partner. Some couples wish to know their risk of having a baby with a genetic defect and others do not. We will support your decision. Abnormal results may lead to a discussion of options for further testing.    Accurate dating of your pregnancy is important for all testing so an ultrasound may be done prior to referral or testing.    No testing provides certainty; there are false positives and negatives associated with all testing, some more than others.    Most genetic testing is non-invasive (requires only a blood sample and sometimes an ultrasound or both).    It is always wise to check with your insurance carrier before proceeding.    Some testing can be done at our lab and some require a referral.    If you decide to do no testing, the 20 week ultrasound scan, which is a routine or standard ultrasound, has some ability to detect abnormalities in the baby, and identifies obstetric problems.    If you are over 35, you will have the option of a Level II ultrasound, which is a more detailed and targeting scan, that helps detect fetal anomalies as well as obstetric problems.      If you have a more complex family history of chromosomal abnormalities, a referral to a genetic counselor and/or a Maternal Fetal Medicine specialist, to help identify available tests, may be recommended.    TYPES OF GENETIC TESTING AVAILABLE INCLUDE:    Carrier Screening/Testing for Genetic Conditions    There are many inherited conditions for which testing or carrier testing is available. Carrier screening can test for conditions like Cystic Fibrosis, Thalassemia, Sebastian Sachs, Sickle Cell, Hemophilia, Muscular Dystrophy, [place] s disease and many  others.     Cystic Fibrosis (CF) affects both males and females and people from all racial and ethnic groups. However, the disease is most common among Caucasians of Northern  descent. CF is also common among Latinos and American Indians. The disease is less common among  Americans and  Americans. More than 10 million Americans are carriers of a faulty CF gene and many of them don't know that they are CF carriers. One or both parents can be tested any time before or during pregnancy.    Talk to your care provider about your family history and whether you should be screened. A referral to a genetic counselor at [facility] or [facility] can be made by your care provider at any time.    This is also tested for in the [place] Metabolic Screen that your infant receives 24 hours after birth.     Fetal DNA cell Testing: Fiatt or Innatal (Non-Invasive Prenatal Testing)    At 10 wk or greater, a blood sample can be drawn here at clinic or at any of our referral offices. It will provide highly accurate results with low false positive rates for trisomy 18, trisomy 21 (Down Syndrome), and trisomy 13 (> 99% trisomy detection rate at a false positive rate of <0.1%). Gender identification can also be obtained if desired (98% accuracy).  Can also detect some sex-linked chromosomal abnormalities (80-90% accuracy).  This is often done if one of the other screening tests is abnormal.        1st Trimester Screening:     Everyone has an age related risk of having a baby with a genetic abnormality. This testing provides a risk profile which is better than assigning risk based solely on your age.    Refines your risk of having a baby with a chromosomal abnormality such as Trisomy 21 & 18.    This test with detect a fetus with one of these disorders about 85% of the time.    A thickened nuchal fold can also be associated with cardiac defects.    This test requires a blood collection combined with  ultrasound to obtain a measurement of fluid at back of baby s neck (nuchal translucency).    False positive results occur approximately 5% of cases.    An additional blood sample may be necessary in order to calculate your risk of having a baby with a neural tube defect (e.g. spina bifida).  This is called the AFP test (alpha fetal protein).  An ultrasound should be able to  any spinal defect as well.    Follow-up of an abnormal test may include a more extensive ultrasound study and you may be offered an amniocentesis (a small sample of amniotic fluid is withdrawn and studied) for a definitive diagnosis    Quad Screen (4-marker screen)    Between 15 and 21 weeks, a sample of blood can be drawn at our lab to assess your risk of having a baby with Down Syndrome, Trisomy 18 and neural tube defects. Such testing is able to detect these conditions in 80% of cases and the false-positive rate is approximately 5%.     Follow-up of an abnormal test may include a more extensive ultrasound study and you may be offered an amniocentesis (a small sample of amniotic fluid is withdrawn and studied) for a definitive diagnosis      Referrals opportunities include:    [facility],  [facility]  o Offers nuchal translucency ultrasound; does not offer genetic counseling.    [place]  [facility] and [facility] ([facility]):   o Approximately an hour long visit includes 30 min with genetic counselor who discusses all testing available and which ones might be beneficial to you based on age, personal and family history.    o Blood will be drawn and the nuchal translucency ultrasound will be discussed and performed if desired. The Free Fetal DNA testing (Quantico/Verifi) can be drawn also.  o Targeted or detailed Level II ultrasounds are also available with these perinatology groups.        Breastfeeding: a Healthy Option for You and Your Baby  Consider breastfeeding for the  healthiest way to feed your baby. Ask your midwife or physician for more information.     The choice of how you will feed your baby is important.  Before your baby s birth, you ll want to learn about the benefits of breastfeeding.  [facility] have been designated Baby Friendly; an initiative that was created by the World Health Organization and UNICEF.  This helps give you and your baby the best start in feeding their baby.    Why should I breastfeed my baby?    Babies are less likely to develop childhood obesity or diabetes    Babies are less likely to suffer from recurrent ear infections    Babies are less likely to be hospitalized for respiratory conditions    Breast milk is rich in nutrients and antibodies-it is easy to digest    How does it benefit me?    Lowers the risk for diabetes, breast and ovarian cancer and postpartum depression    Moms can lose  baby weight  more quickly    Cost savings - formula can cost well over $1,500 per year    Convenient - no bottles and nipples to sterilize, no measuring and mixing formula    The physical contact with breastfeeding can make babies feel secure, warm and comforted     What about formula?  While you and your baby are staying with us at [facility], we will support whatever feeding choice you make for your baby.    Some important considerations:      The American Academy of Pediatrics, the World Health Organization, and many more organizations recommend exclusive breastfeeding for 6 months and continued breastfeeding while adding other foods for the first 1-2 years.      Any amount of breastmilk has benefits to both baby and mother.    Giving formula in replacement of breastfeeding can affect mother s milk supply.  If formula is needed, hospital staff will work with you on a plan to help develop your milk supply.    Formula alters the natural growth of good bacteria in the  stomach.     Research has found that first time mothers who offer formula in  the hospital have a shorter duration of breastfeeding.    How can I start to prepare?     Start by having a conversation with your medical provider.     Talk with your partner, family and friends.     Attend a prenatal class that includes breastfeeding preparation. Birth and breastfeeding classes are offered by [facility]. Visit TopBlip for class information.     After your baby s birth, hospital staff and lactation consultants will help you and your baby get off to a great start with breastfeeding.    As your center of gravity and weight changes, use good body mechanics when changing positions and lifting. For example, use a straight back and your legs for support when lifting instead of bending over. Maintain good posture to prevent straining your muscles. Now is a good time to continue or restart your exercise program. Walking 30-60 minutes daily is an excellent way to keep fit. Yoga and swimming also offer many benefits.    The nausea and fatigue of early pregnancy have usually started to let up, so this is a good time to focus on nutrition. Consider attending a nutrition class. A healthy diet includes about 60 grams of protein each day (3-4 servings of dairy, 2-3 servings of meat/fish/poultry/nuts), 4-6 servings of whole grain foods, and 5-6 servings of fruits and vegetables. Remember to drink 6-8 glasses of water daily.    Watch for warning signs, such as     vaginal bleeding    fluid leaking from your vagina    severe abdominal pain    nausea and vomiting more than 4-5 times a day, or if you are unable to keep anything down    fever more than 100.4 degrees F.       RESOURCES   You can refer to the Starting Out Right book or find it online at [email or web address] or [email or web address]    You can sign up for a weekly  parenting e-mail that gives support, tips and advice from health care professionals that starts with pregnancy and continues through the toddler years. To register, go to [email or web address] at any time during your pregnancy.    Breastfeeding:    OUTPATIENT LACTATION RESOURCES     -Schedule an appointment with a [facility] Nurse Midwives [facility] who is also a Lactation Consultant by calling [phone]. We see women for breastfeeding visits at [place], [place] and [facility].     -Baby Café    Pregnant and interested in breastfeeding?  Need answers to breastfeeding questions?  Want to help breastfeeding moms?  Already breastfeeding and want to meet other moms?    Join us at the Baby Café!    Baby Cafe is a free, drop-in service offering breast-feeding support for pregnant women, breast-feeding mothers and their families.  Come share tips and socialize with other mothers.  Babies and siblings are welcome (no childcare available).    Starting April 2018, Baby Café will be at 4 locations.  Please see below for the Baby Café closest to you! Hmong, Montenegrin, and [place]er which is may be available at some sites.      [facility]  [address] [place] [address]  1st Wednesday: 10am-12pm    [facility]  [address] [place] [address]  3rd Wednesday 4-6pm    [facility]  [address] [place] [address]  4th Wednesday 10am-12:30pm    [facility]  [address] [place] [address]  4th Wednesdays: 4-6pm    -Attend a baby weigh in at [facility].  Lactation consultants are available to answer questions  [place]: Tuesdays 1:00 - 2:00  [facility]: Mondays 1:00 - 2:00   [email or web address]    -Attend one of the New Mama groups at [facility] in [facility].  [facility] also offers one-on-one in home and in office lactation consults.   [email or web address]    -Attend a LeLeche League meeting.   Multiple groups in several locations throughout the [facility]. The meetings are no-cost and always informative breastfeeding education session through [employer] La Leche League  [email or web address]     Held at [facility] the second Thursday of each month at 7pm    Childbirth and Parenting Education:       Everyday Miracles:   https://www.everyday-miracles.org/    Free Video Series from [facility]: https://nursing.[facility]/academics/specialty-areas/nurse-midwifery/having-baby-prenatal-videos/having-baby-prenatal-and    [facility] parenting center: http://Endpoint Clinical/   ([phone]) 458-[phone]  [place]: (education, yoga & wellness) www.Screen Tonic  Enlightened Mama: www.enlightenedmama.Ge.tt   Childbirth collective: (Parent topic nights)  www.childbirthcollective.org/  Hypnobabies:  www.hypnPockitbiViragen.Ge.tt/  Hypnobirthing:  Http://hypnobirthing.Ge.tt/  The Birth Hour: https://Webcom/online-childbirth-class/    APPS and Podcasts:   Ace [employer]ure    Evidence Based Birth  The Birth Hour (for birth stories)   Birthful   Expectful   The Longest Shortest Time  PregnancyPodcast Nehal Shankar    Book Recommendations:   Peyton Papito's Birthing From Within--first few chapters include a new-age tone, you may prefer to skip it and keep going, because there is good stuff later.  This book recommendation covers emotional preparation, but does cover coping with pain, and use of both pharmacological and nonpharmacological methods.    Dr. Garza' The Pregnancy Book and The Birth Book--the pregnancy book goes month-by month      The Birth Partner by Maru Kimbrough    Womanly Art of Breastfeeding by La Leche [employer] International   Bestfeeding by Karen Bailey--great pictures    Mothering Your Nursing Toddler, by Giulia Gomez.   Addresses dealing with so many of the challenging behaviors of a nursing toddler.  How Weaning Happens, by La Leche League.  Discusses weaning at all ages, from medically  "necessary weaning of an infant, all the way up to age 5 (or older), with why/why not, and strategies.  Very empowering book both for deciding to wean and deciding not to.    American College of Nurse-Midwives (ACNM) http://www.midwife.org/; look at the informational handouts at http://www.midwife.org/Share-With-Women     www.mymidwife.org    Mother to Baby (Medication and Herbal guidance in pregnancy): http://www.mothertobaby.org  Toll-Free Hotline: 524.591.7939  LactMed (Medication use while breastfeeding): http://toxnet.nlm.nih.gov/newtoxnet/lactmed.htm    Women's Health.gov:  http://www.womenshealth.gov/a-z-topics/index.html    American pregnancy association - http://americanpregnancy.org    Centering Pregnancy (group prenatal care option): http://centeringhealthcare.org    Information about doulas:  Childbirth collective: http://www.childbirthcollective.org/  Doulas [facility] [place] (JONAS):  www.jonas.org  [facility]  project: http://twincitiesdoulaproject.com/     Early Childhood and Family Education (ECFE):  ECFE offers parents hands-on learning experiences that will nourish a lifetime of teachable moments.  http://ecfe.info/ecfe-home/    March of Dimes www.CollegeMapper.Webcom     FDA - Nutrition  www.mypyramid.gov  Under \"For Consumers,\" click on \"pregnant and breastfeeding women.\"      Centers for Disease Control and Prevention (CDC) - Vaccines : http://www.cdc.gov/vaccines/       When researching information on the web, question the validity of websites.  The domains .gov, .edu and.org tend to be more reliable information.  If there are a lot of advertisements, be cautious of the information provided. Stay away from blogs and chat rooms please!      Nutrition & supplements:     Prenatal vitamin (those with 600-1000 mcg folic acid and 27 mg of iron are enough).  Take with food or Juice     4-5 servings of dairy or other calcium rich foods (fish, leafy greens, soy) per day - if not, take 500-1000 mg " additional calcium (Tums, pills, chews). Take with dairy     Vitamin D3 0253-8635 IU geltab daily.  Take with fattiest meal.  Look for fortified foods also (Dairy, Juice)     2-3 (4) oz servings of fish, seafood, nuts (walnuts & almonds), oils, avocado per week - if not, take Omega 3 Fatty acids: DHA & TRENT 8359-7768 mg per day.  Other names: cod liver oil, fish oil. Take with fattiest meal.  Some prenatals have DHA, but typically not a sufficient dose.    Fish: Do not eat shark, swordfish, nurys mackerel, or tilefish when you are pregnant or breastfeeding.  They contain high levels of mercury.  Limit white (albacore) tuna to no more than 6 ounces per week. Http://www.fda.gov/downloads/ForConsumers/ConsumerUpdates/ZYK842557.pdf         Touring the Maternity Care Center  At this time we are offering a virtual tour of the Maternity Care Centers at both [facility] and [facility]:   [facility]: https://www.Storefront.[employer]/Locations/[facility]/Maternity-Care-Center  [place]:   https://Storefront.org/[facility]/the-birthplace/tours  https://www.Storefront.org/Locations/[facility]/Maternity-Care-Center/#virtual_tour  When in person tours become available, registrations is required. To schedule a tour at either [facility] or [facility], please do so online using the following links:  [facility] - [email or web address]/[email or web address]&[email or web address]&[email or web address]  [facility] - [email or web address]://www.[email or web address]/[email or web address]&[email or web address]&[email or web address]&[email or web address]     You are invited to  Meet the [facility] [place] [facility]wives [facility]    Virtual:   https://www.Storefront.org/classes-and-events/meet-the-midwives-from-[facility]    A way to tour the hospital Labor and Delivery unit and meet the midwives in our group was postponed at the start of hospital restrictions following COVID-19. We will resume these when  able.    Please call [phone] for ongoing updates.

## 2022-04-03 NOTE — PROGRESS NOTES
"PRENATAL VISIT   FIRST OBSTETRICAL EXAM - OB     Assessment / Impression   First prenatal visit at 10w2d  23yo    Cervical cancer screening- first pap today  BMI 31.8  EDPS = 1, \"9\" to #10  History of diabetes in first degree relatives (mother and sister)  Former smoker, quit with + UPT  At risk for preeclampsia  History of chlamydia and gonorrhea     Plan:   -IOB labs drawn, including hemoglobin A1c, sickle cell screen, pap smear.   -Recommended iron supplementation as well as 2000 IUs of vitamin D daily.  -Discussed COVID infection and safety of vaccination in pregnancy.  Is eligible but declines Covid booster today.  -Pt is not a candidate for drawing lead level per [facility] screening tool.   -Patient is interested in waterbirth.    -Reviewed prenatal care schedule.   -Optimal nutrition and weight gain discussed. Pregnancy weight gain of 11-20 lbs (BMI 30.0 or 34.9) encouraged.   -Anticipatory guidance for common pregnancy questions and concerns reviewed.   -Danger s/sx for this trimester reviewed with patient.   -Reviewed genetic screening options with patient, patient does elect for NIPS screening.  Encouraged to check with her insurance for coverage and requirements. Patient acknowledges recommendation and desires to proceed today.   -Reviewed carrier testing options with patient, patient does not elect for testing or referral to genetic counseling.  -IOB packet given and reviewed with patient.   -CNM services and hospital options reviewed; emergency and scheduling phone numbers given to patient.   -Because the patient does have 2 or more moderate risk factors, low-dose aspirin will be initiated at 12 weeks.   -Antepartum VTE risk factors absent.  -Patient is at increased risk for overt diabetes, so A1C will be added to IOB labs today.  -Pt is not a candidate for an antepartum OB consult.    -Return to clinic in 4 weeks or sooner as needed.    Total time: 60 minutes spent on the date of the encounter doing " chart review, review of test results, patient visit and documentation.     Subjective:   Sudarshan Fierro is a 22 year old  here today for her first obstetrical exam at 10w3d. Here alone . She is excited to be pregnant.  She and her partner were not necessarily trying but they were not preventing either. The patient reports nausea. Patient's last menstrual period was 2022 (exact date). Last period was normal. Has had some irregular cycles, sometimes 2 cycles in 1 month. UPT was positive at home on 22.  visit and dating ultrasound on 3/7/22 confirms pregnancy dating, measurements indicating SIUP at 6w3d.  Will select [facility] based on ultrasound due to some cycle irregularity.  Estimated Date of Delivery: Oct 28, 2022.     She currently lives with her sister and her niece.  Her [place] on [place]  She feels safe in her relationship.  When he returns from base they will plan to look for an apartment together.    The patient states that she is in a monogamous relationship and states that she is safe. Offered GC/CT screening today and patient accepts. Current symptoms also include: breast tenderness and fatigue.     Risk factors: pre-pregnancy obesity. Pregnancy Risk Factors:Currently unmarried, Significantly overweight or underweight and Work: standing >4hr/shift or heavy exertion    The patient has the following high risk factors for preeclampsia:none. The patient has the following moderate risk factors for preeclampsia:first pregnancy, BMI greater than 30, family history of preeclampsia (mother or sister) and sociodemographic characteristics (-American, low SES).     The patient has the following antepartum risk factors for VTE (two or more risk factors, or 1 * risk factor, places patient at higher risk): none. Clinical history/risk factors requiring antepartum OB consult: none.     The patient has the following risk factors for overt diabetes: Body mass  "index greater than or equal to 25 kg/m2. Plus the additional risk factor(s) of: First-degree relative with diabetes and High-risk race/ethnicity (eg, , , ,  American, ).    Social History:   Education level: some college   Occupation: manager at [employer]   Partners name: Fidel   ?   OB History    Para Term  AB Living   1 0 0 0 0 0   SAB IAB Ectopic Multiple Live Births   0 0 0 0 0      # Outcome Date GA Lbr Johnathan/2nd Weight Sex Delivery Anes PTL Lv   1 Current               History:   Past Medical History:   Diagnosis Date     Chlamydia      Gonorrhea       Past Surgical History:   Procedure Laterality Date     MOUTH SURGERY        Family History   Problem Relation Age of Onset     Diabetes Mother      Early Death Mother      Schizophrenia Mother      Diabetes Sister      Schizophrenia Sister       Social History     Tobacco Use     Smoking status: Former Smoker     Types: Cigarettes     Quit date: 2022     Years since quittin.1     Smokeless tobacco: Never Used   Substance Use Topics     Alcohol use: Not Currently     Drug use: Never      Current Outpatient Medications   Medication Sig Dispense Refill     aspirin (ASA) 81 MG EC tablet Take 1 tablet (81 mg) by mouth daily Start at 12 weeks of pregnancy. Take 1 tab daily. 60 tablet 3     ferrous sulfate (SLO-FE) 142 (45 Fe) MG CR tablet Take 1 tablet (142 mg) by mouth every other day 30 tablet 3     Prenatal Vit-Fe Fumarate-FA (PRENATAL MULTIVITAMIN W/IRON) 27-0.8 MG tablet Take 1 tablet by mouth daily 90 tablet 3      Allergies   Allergen Reactions     Sukh Torres      The patient's medical, surgical and family histories were reviewed and were pertinent to this visit.   Pap smear: Last Pap: never.  Next Due: today.  EPDS score today: 1.\"0\" to #10   History of anxiety or depression: admits to some undiagnosed social anxiety.     Review of Systems   General: Fatigue but " "otherwise denies problem   Eyes: Denies problem   Ears/Nose/Throat: Denies problem   Cardiovascular: Denies problem   Respiratory: Denies problem   Gastrointestinal: Nausea without vomiting, otherwise negative   Genitourinary: Denies any discharge, vaginal bleeding or itchiness or any other problem   Musculoskeletal: Breast tenderness otherwise denies problem   Skin: Denies problem   Neurologic: Denies problem   Psychiatric: Denies problem   Endocrine: Denies problem   Heme/Lymphatic: Denies problem   Allergic/Immunologic: Denies problem     Objective:   Objective    Vitals:    04/04/22 1059   BP: 118/66   Pulse: 80   Weight: 82.1 kg (181 lb)   Height: 1.607 m (5' 3.25\")      Physical Exam:   General Appearance: Alert, cooperative, no distress, appears stated age   HEENT: Normocephalic, without obvious abnormality, atraumatic. Conjunctiva/corneas clear.  Neck: Supple, symmetrical, trachea midline, no adenopathy.   Thyroid: not enlarged, symmetric, no tenderness/mass/nodules   Back: Symmetric, no curvature, ROM normal, no CVA tenderness   Lungs: Clear to auscultation bilaterally, respirations unlabored   Heart: Regular rate and rhythm, S1 and S2 normal, no murmur, rub, or gallop. No edema to lower extremities.   Breasts: No breast masses, tenderness, asymmetry, or nipple discharge. Nipples are everted.   Abdomen: Gravid, soft, non-tender, bowel sounds active all four quadrants, no masses.   FHT: not heard   Vulva:  no sign of lesions or condyloma, normal hair distribution   Vagina: pink, normal rugae, no abnormal discharge   Cervix:  long/thick/closed, no lesions or inflammation noted, negative CMT with exam   Musculoskeletal: Extremities normal, atraumatic, no cyanosis   Skin: Skin color, texture, turgor normal, no rashes or lesions   Lymph nodes: Cervical, supraclavicular, and axillary nodes normal   Neurologic: Alert and oriented x 3. Normal speech                "

## 2022-04-04 ENCOUNTER — PRENATAL OFFICE VISIT (OUTPATIENT)
Dept: MIDWIFE SERVICES | Facility: CLINIC | Age: 22
End: 2022-04-04
Payer: COMMERCIAL

## 2022-04-04 VITALS
DIASTOLIC BLOOD PRESSURE: 66 MMHG | HEART RATE: 80 BPM | WEIGHT: 181 LBS | SYSTOLIC BLOOD PRESSURE: 118 MMHG | BODY MASS INDEX: 32.07 KG/M2 | HEIGHT: 63 IN

## 2022-04-04 DIAGNOSIS — Z34.00 SUPERVISION OF NORMAL FIRST PREGNANCY, ANTEPARTUM: Primary | ICD-10-CM

## 2022-04-04 DIAGNOSIS — Z12.4 CERVICAL CANCER SCREENING: ICD-10-CM

## 2022-04-04 DIAGNOSIS — Z83.3 FAMILY HISTORY OF DIABETES MELLITUS IN FIRST DEGREE RELATIVE: ICD-10-CM

## 2022-04-04 DIAGNOSIS — Z13.79 GENETIC SCREENING: ICD-10-CM

## 2022-04-04 DIAGNOSIS — Z13.79 AFRICAN ANCESTRY REQUIRING POPULATION-SPECIFIC GENETIC SCREENING: ICD-10-CM

## 2022-04-04 DIAGNOSIS — Z91.89 AT RISK FOR COMPLICATION OF PREGNANCY: ICD-10-CM

## 2022-04-04 PROBLEM — E55.9 HYPOVITAMINOSIS D: Status: ACTIVE | Noted: 2017-04-24

## 2022-04-04 LAB
ABO/RH(D): NORMAL
ANTIBODY SCREEN: NEGATIVE
ERYTHROCYTE [DISTWIDTH] IN BLOOD BY AUTOMATED COUNT: 18.9 % (ref 10–15)
HBA1C MFR BLD: 4.9 % (ref 0–5.6)
HCT VFR BLD AUTO: 34.6 % (ref 35–47)
HGB BLD-MCNC: 10.9 G/DL (ref 11.7–15.7)
MCH RBC QN AUTO: 26.8 PG (ref 26.5–33)
MCHC RBC AUTO-ENTMCNC: 31.5 G/DL (ref 31.5–36.5)
MCV RBC AUTO: 85 FL (ref 78–100)
PLATELET # BLD AUTO: 230 10E3/UL (ref 150–450)
RBC # BLD AUTO: 4.07 10E6/UL (ref 3.8–5.2)
SPECIMEN EXPIRATION DATE: NORMAL
SPECIMEN EXPIRATION DATE: NORMAL
WBC # BLD AUTO: 7.9 10E3/UL (ref 4–11)

## 2022-04-04 PROCEDURE — 83021 HEMOGLOBIN CHROMOTOGRAPHY: CPT | Mod: 90 | Performed by: ADVANCED PRACTICE MIDWIFE

## 2022-04-04 PROCEDURE — 85027 COMPLETE CBC AUTOMATED: CPT | Performed by: ADVANCED PRACTICE MIDWIFE

## 2022-04-04 PROCEDURE — 87491 CHLMYD TRACH DNA AMP PROBE: CPT | Performed by: ADVANCED PRACTICE MIDWIFE

## 2022-04-04 PROCEDURE — 99207 PR FIRST OB VISIT: CPT | Performed by: ADVANCED PRACTICE MIDWIFE

## 2022-04-04 PROCEDURE — 99000 SPECIMEN HANDLING OFFICE-LAB: CPT | Performed by: ADVANCED PRACTICE MIDWIFE

## 2022-04-04 PROCEDURE — 86762 RUBELLA ANTIBODY: CPT | Performed by: ADVANCED PRACTICE MIDWIFE

## 2022-04-04 PROCEDURE — G0123 SCREEN CERV/VAG THIN LAYER: HCPCS | Performed by: ADVANCED PRACTICE MIDWIFE

## 2022-04-04 PROCEDURE — 83036 HEMOGLOBIN GLYCOSYLATED A1C: CPT | Performed by: ADVANCED PRACTICE MIDWIFE

## 2022-04-04 PROCEDURE — 86780 TREPONEMA PALLIDUM: CPT | Performed by: ADVANCED PRACTICE MIDWIFE

## 2022-04-04 PROCEDURE — 82728 ASSAY OF FERRITIN: CPT | Performed by: ADVANCED PRACTICE MIDWIFE

## 2022-04-04 PROCEDURE — 86803 HEPATITIS C AB TEST: CPT | Performed by: ADVANCED PRACTICE MIDWIFE

## 2022-04-04 PROCEDURE — 86850 RBC ANTIBODY SCREEN: CPT | Performed by: ADVANCED PRACTICE MIDWIFE

## 2022-04-04 PROCEDURE — 87086 URINE CULTURE/COLONY COUNT: CPT | Performed by: ADVANCED PRACTICE MIDWIFE

## 2022-04-04 PROCEDURE — 86901 BLOOD TYPING SEROLOGIC RH(D): CPT | Performed by: ADVANCED PRACTICE MIDWIFE

## 2022-04-04 PROCEDURE — G0124 SCREEN C/V THIN LAYER BY MD: HCPCS | Performed by: PATHOLOGY

## 2022-04-04 PROCEDURE — 87340 HEPATITIS B SURFACE AG IA: CPT | Performed by: ADVANCED PRACTICE MIDWIFE

## 2022-04-04 PROCEDURE — 86900 BLOOD TYPING SEROLOGIC ABO: CPT | Performed by: ADVANCED PRACTICE MIDWIFE

## 2022-04-04 PROCEDURE — 36415 COLL VENOUS BLD VENIPUNCTURE: CPT | Performed by: ADVANCED PRACTICE MIDWIFE

## 2022-04-04 PROCEDURE — 83550 IRON BINDING TEST: CPT | Performed by: ADVANCED PRACTICE MIDWIFE

## 2022-04-04 PROCEDURE — 87591 N.GONORRHOEAE DNA AMP PROB: CPT | Performed by: ADVANCED PRACTICE MIDWIFE

## 2022-04-04 PROCEDURE — 87389 HIV-1 AG W/HIV-1&-2 AB AG IA: CPT | Performed by: ADVANCED PRACTICE MIDWIFE

## 2022-04-04 PROCEDURE — 99215 OFFICE O/P EST HI 40 MIN: CPT | Mod: 25 | Performed by: ADVANCED PRACTICE MIDWIFE

## 2022-04-05 LAB
FERRITIN SERPL-MCNC: 131 NG/ML (ref 12–150)
HBV SURFACE AG SERPL QL IA: NONREACTIVE
HCV AB SERPL QL IA: NONREACTIVE
HIV 1+2 AB+HIV1 P24 AG SERPL QL IA: NONREACTIVE
IRON SATN MFR SERPL: 82 % (ref 15–46)
IRON SERPL-MCNC: 156 UG/DL (ref 35–180)
RUBV IGG SERPL QL IA: 4.6 INDEX
RUBV IGG SERPL QL IA: POSITIVE
T PALLIDUM AB SER QL: NONREACTIVE
TIBC SERPL-MCNC: 190 UG/DL (ref 240–430)

## 2022-04-06 LAB
BACTERIA UR CULT: NORMAL
C TRACH DNA SPEC QL NAA+PROBE: NEGATIVE
HGB S BLD QL: NEGATIVE
N GONORRHOEA DNA SPEC QL NAA+PROBE: NEGATIVE

## 2022-04-08 ENCOUNTER — PATIENT OUTREACH (OUTPATIENT)
Dept: MIDWIFE SERVICES | Facility: CLINIC | Age: 22
End: 2022-04-08
Payer: COMMERCIAL

## 2022-04-08 LAB
BKR LAB AP GYN ADEQUACY: ABNORMAL
BKR LAB AP GYN INTERPRETATION: ABNORMAL
BKR LAB AP GYN OTHER FINDINGS: ABNORMAL
BKR LAB AP HPV REFLEX: NO
BKR LAB AP LMP: ABNORMAL
BKR LAB AP PREVIOUS ABNORMAL: ABNORMAL
PATH REPORT.COMMENTS IMP SPEC: ABNORMAL
PATH REPORT.COMMENTS IMP SPEC: ABNORMAL
PATH REPORT.RELEVANT HX SPEC: ABNORMAL

## 2022-04-12 ENCOUNTER — TELEPHONE (OUTPATIENT)
Dept: MIDWIFE SERVICES | Facility: CLINIC | Age: 22
End: 2022-04-12
Payer: COMMERCIAL

## 2022-04-12 DIAGNOSIS — Z13.79 GENETIC SCREENING: ICD-10-CM

## 2022-04-12 DIAGNOSIS — B96.89 BACTERIAL VAGINOSIS: Primary | ICD-10-CM

## 2022-04-12 DIAGNOSIS — N76.0 BACTERIAL VAGINOSIS: Primary | ICD-10-CM

## 2022-04-12 LAB — SCANNED LAB RESULT: NORMAL

## 2022-04-12 RX ORDER — METRONIDAZOLE 500 MG/1
500 TABLET ORAL 2 TIMES DAILY
Qty: 14 TABLET | Refills: 0 | Status: SHIPPED | OUTPATIENT
Start: 2022-04-12 | End: 2022-04-19

## 2022-04-12 NOTE — TELEPHONE ENCOUNTER
Phone call to chart review normal NIPS result.  Gender info shared as requested.    Patient states understanding of bacterial vaginosis findings on Pap smear and is reporting increase in thinner discharge.  Accepts antibiotic prescription to home pharmacy.

## 2022-04-18 ENCOUNTER — PRENATAL OFFICE VISIT (OUTPATIENT)
Dept: MIDWIFE SERVICES | Facility: CLINIC | Age: 22
End: 2022-04-18
Payer: COMMERCIAL

## 2022-04-18 VITALS
BODY MASS INDEX: 32.25 KG/M2 | DIASTOLIC BLOOD PRESSURE: 64 MMHG | HEART RATE: 76 BPM | SYSTOLIC BLOOD PRESSURE: 116 MMHG | HEIGHT: 63 IN | WEIGHT: 182 LBS

## 2022-04-18 DIAGNOSIS — Z34.00 SUPERVISION OF NORMAL FIRST PREGNANCY, ANTEPARTUM: Primary | ICD-10-CM

## 2022-04-18 DIAGNOSIS — Z83.3 FAMILY HISTORY OF DIABETES MELLITUS IN FIRST DEGREE RELATIVE: ICD-10-CM

## 2022-04-18 DIAGNOSIS — Z12.4 CERVICAL CANCER SCREENING: ICD-10-CM

## 2022-04-18 DIAGNOSIS — Z13.79 AFRICAN ANCESTRY REQUIRING POPULATION-SPECIFIC GENETIC SCREENING: ICD-10-CM

## 2022-04-18 PROCEDURE — 99207 PR PRENATAL VISIT: CPT | Performed by: ADVANCED PRACTICE MIDWIFE

## 2022-04-18 NOTE — PATIENT INSTRUCTIONS
"Welcome to [facility]   and thank you for choosing us for your maternity care provider!  Congratulations!      [facility]  After each of your visits you are welcome to check Social Pulse for your visit summary including education and links to information relevant to your pregnancy and/or well woman care.   Find the \"Visits\" tab at the top of the page, you will see a list of recent visits and for each visit a for link for \"View After Visit Summary.\" View of your After Visit Summary will allow you to read our recommendations from your visit, review any education provided, and link to websites with useful information.   If you have any questions or difficulty navigating [employer], please feel free to contact us and we will do our best to direct you.  Meet the Midwives from [facility]  You are invited to an informational meet and greet with [facility]'s [facility] Certified Nurse-Midwives. Our free \"Meet the Midwives\" event is a great opportunity to learn about our midwives' philosophy and experience, the hospitals where we can assist with your birth, and answer questions you may have. Partners, friends, and family are welcome to attend. Currently, this is a virtual event.  Date  First Tuesday of every month at 7 pm.    Link to next (live) meeting  [email or web address]  To Join by Telephone (audio only) Call:   [phone] Phone Conference ID: [phone]#    Contact information:  Appointment line and to get a hold of CNM in clinic Monday-Friday 8 am - 5 pm:  [phone].  There are some clinics with early start times (1st appointment 7:40 am) and others with evening hours (last appointment 6:20 pm).  Most are typically open from 8 am to 5 pm.    CNM on call answering service: [phone].  Specify your hospital of choice and leave a brief message for CNM;  will then page CNM who is on call " at your specified hospital and you should receive a call back with 15 minutes.  Be sure that your ringer is audible and that you can accept blocked calls so that we can get back in touch with you! This number should be reserved for urgent needs if during the day, before 8 am, after 5 pm, weekends, holidays.      Pregnancy: Body Changes  From conception (fertilization) until after the birth of your child, you and your baby will change every day. To help you understand what is happening, we ve outlined how pregnancy begins and some of the changes you may notice.  How Pregnancy Begins  Conception is the union of a sperm and an egg. When it occurs, your baby s genetic makeup is complete, even its sex. Fertilization takes place in the fallopian tube. The fertilized egg then travels down this tube to the uterus (womb). The egg attaches to the lining of the uterus about a week later. There it grows and is nourished.    Your Changing Body  Pregnancy affects almost every part of your body. You may notice some of the following physical and emotional changes:  Your uterus expands outward and upward as your baby grows. You may feel pressure on your bladder, stomach, and other organs.  You may notice skin color changes on your forehead, nose, and cheeks. A dark line may form from your bellybutton down to your pubic area. The skin color around your nipples and thighs may also change.  Pink stretch marks may appear on your abdomen, breasts, or hips.  Your hair may seem thicker. You lose less hair during pregnancy.  You may feel fine one day and weepy the next. This is caused by changes in your body, such as increased hormones (chemicals that affect the function of certain organs and also your moods).      Adapting to Pregnancy: First Trimester  As your body adjusts, you may have to change or limit your daily activities. You ll need more rest. You may also need to use the energy you have more wisely.  Eat stomach-friendly foods  like cottage cheese, crackers, or bread throughout the day.    Your Changing Body  Almost every part of your body is affected as you adapt to pregnancy. The uterus and cervix will begin to soften right away. You may not look very pregnant during the first three months. But you are likely to have some common signs of early pregnancy:  Nausea  Fatigue  Frequent urination  Mood swings  Bloating of the abdomen  Missed or light periods (first trimester bleeding)  Nipple or breast tenderness, breast swelling    It s Not Too Late to Start Good Habits  What matters most is protecting your baby from this moment on. If you smoke, drink alcohol, or use drugs, now is the time to stop. If you need help, talk with your health care provider.  Smoking increases the risk of stillbirth or having a low-birth-weight baby. If you smoke, quit now.  Alcohol and drugs have been linked with miscarriage, birth defects, intellectual disability, and low birth weight. Do not drink alcohol or take drugs.    Tips to Relieve Nausea  Although nausea can occur at any time of the day, it may be worse in the morning. To help prevent nausea:  Eat small, light meals at frequent intervals.  Get up slowly. Eat a few unsalted crackers before you get out of bed.  Drink water with lemon slices.  Eat an ice pop in your favorite flavor.  Ask your health care provider about taking stew or vitamin B6 for nausea and vomiting.  Talk with your health care provider if you take vitamins that upset your stomach.    Work Concerns  The end of the first trimester is a good time to discuss working during pregnancy with your employer. Follow your health care provider s advice if your job requires you to stand for a long time, work with hazardous tools, or even sit at a desk all day. Your workspace, workload, or scheduled hours may need to be adjusted. Perhaps you can change body postures more often or take an extra break.  Advice for Travel  Talk to your health care  provider first, but the second trimester may be the best time for any travel. You may be advised to avoid certain trips while you re pregnant. Food and water can be concerns in developing countries. Travel by car is a good choice, as you can stop, get out, and stretch. Bring snacks and water along. Fasten the lap belt below your belly, low over your hips. Also be sure to wear the shoulder harness.  Intimacy  Unless your health care provider tells you to, there is no reason to stop having sex while you re pregnant. You or your partner may notice changes in desire. Desire may be less in the first trimester, due to nausea and fatigue. In the second trimester, sex may be very enjoyable. The third trimester can be a challenge comfort-wise. Try different positions and see what s best for you both.      Pregnancy: Your First Trimester Changes  The first trimester is a time of rapid development for your baby. Because your baby is growing so quickly, it is important that you start a healthy lifestyle right away. By the end of the first trimester, your baby has formed all of its major body organs and weighs just over an ounce.    Month 1 (Weeks 1-4)  The placenta (the organ that nourishes your baby) begins to form. The heart and lungs begin to develop. Your baby is about 1/4 inch long by the end of the first month.    Month 2 (Weeks 5-8)  All of your baby s major body organs form. The face, fingers, toes, ears, and eyes appear. By the end of the month, your baby is about 1 inch long.    Month 3 (Weeks 9-12)  Your baby can open and close its fists and mouth. The sexual organs begin to form. As the first trimester ends, your baby is about 4 inches long.      Pregnancy: Your Weight  Being a healthy weight is important for both you and your baby. The weight you gain now is not just extra fat. It is also the weight of your baby. And it is the increased blood and fluids to support the baby. A slow, steady rate of gain is best. How  much you should gain depends on your weight before getting pregnant. Check with your health care provider to find out what is right for you.    If You Gain Too Much  Gaining too much weight might cause you to feel tired or you could have a harder pregnancy or birth. If you and your health care provider decide you re gaining too much:  Eat fewer fats and sugars. Instead, eat fruit, vegetables, and whole-grain foods.  Drink plenty of water between meals.  Get at least 20 minutes of light exercise, such as walking, each day.  Don t diet. You might not get enough of the nutrients you or your baby needs.  Keep a diet diary to help you gauge what and how much you are eating .    If You re Not Gaining Enough  If you don t gain enough, your baby could be too small or have health problems. Women tend to gain most of their weight in the second and third trimesters. For now:  Eat many types of foods. Make sure you get enough calcium, protein, and carbohydrates.  Don t skip meals.  Eat healthy snacks.  Pick nutrient-dense, high calorie healthy food like trail mix or protein shakes.  See a dietitian for help.  Talk to your healthcare provider if you have had an eating disorder or problems with certain foods.      Pregnancy: Common Questions  There are plenty of myths and  old wives  tales  surrounding pregnancy. You may need help  fact from fiction. On this sheet, you ll find answers to a few common questions. If you have other questions, talk with a midwife.    Will Working Harm My Baby?  In most cases, working throughout your pregnancy is not harmful at all. There may be concerns if the job involves dangerous machinery or chemicals, lifting, or standing for very long periods of time. Talk to your health care provider and employer about your particular job and pregnancy.  Why Can t I Change the Cat Litter Box?  Cats carry a disease called toxoplasmosis. In adult humans, it shows up as a mild infection of the blood and  organs. If you are infected during pregnancy, the baby s brain and eyes could be damaged. To be safe, have someone else change the litter. If you must handle it, wear a paper mask over your nose and mouth. Also, wear gloves and wash your hands afterward.  Which Medications Are Safe?  No prescription or over-the-counter drug is safe for everyone all of the time. But sometimes medications are needed. Be sure your health care provider knows you are pregnant. Then use only the medications he or she advises you to take. Please refer to the below resources for further information and discuss concern and questions with your midwife.  Is It True That I Can Overheat My Baby?  Yes. To avoid making your baby too warm:  Don t sit in a jacuzzi. A long, warm bath is fine, but not in water over 100 F.  Exercise less intensely if you feel fatigued. Base your workout on how you feel, not your heart rate. Heart rates aren t a good way to measure effort during pregnancy.  Can I Lift and Carry Safely?  Yes, if your health care provider doesn t tell you otherwise. Learn to lift and carry safely to avoid injury and reduce back pain during pregnancy. To protect your back:  Bend at the knees to bring the load nearer.  Get a good . Test the weight of the load.  Tighten your abdomen. Exhale as you lift.  Lift with your legs, not with your back.  Carry the load close to your body.  Hold the load so you can see where you are going.  What If I Get Sick?  Most women get sick at least once during pregnancy. Talk with your health care provider if you do. Most likely it will not affect your pregnancy. Get plenty of rest and fluids, and eat what you can. Talk to your health care provider before taking any medications.        HEALTHY PREGNANCY CARE: 10-14 WEEKS PREGNANT     By weeks 10 to 14 of your pregnancy, the placenta has formed inside your uterus. It may be possible to hear your baby's heartbeat with a doppler ultrasound device. Your baby's  eyes can and do move. The arms and legs can bend.    GENETIC SCREENING OPTIONS AT [facility]              All testing is optional. We don t recommend or discourage any test; it is totally up to you and your partner. Some couples wish to know their risk of having a baby with a genetic defect and others do not. We will support your decision. Abnormal results may lead to a discussion of options for further testing.  Accurate dating of your pregnancy is important for all testing so an ultrasound may be done prior to referral or testing.  No testing provides certainty; there are false positives and negatives associated with all testing, some more than others.  Most genetic testing is non-invasive (requires only a blood sample and sometimes an ultrasound or both).  It is always wise to check with your insurance carrier before proceeding.  Some testing can be done at our lab and some require a referral.  If you decide to do no testing, the 20 week ultrasound scan, which is a routine or standard ultrasound, has some ability to detect abnormalities in the baby, and identifies obstetric problems.  If you are over 35, you will have the option of a Level II ultrasound, which is a more detailed and targeting scan, that helps detect fetal anomalies as well as obstetric problems.    If you have a more complex family history of chromosomal abnormalities, a referral to a genetic counselor and/or a Maternal Fetal Medicine specialist, to help identify available tests, may be recommended.    TYPES OF GENETIC TESTING AVAILABLE INCLUDE:    Carrier Screening/Testing for Genetic Conditions  There are many inherited conditions for which testing or carrier testing is available. Carrier screening can test for conditions like Cystic Fibrosis, Thalassemia, Sebastian Sachs, Sickle Cell, Hemophilia, Muscular Dystrophy, Quail s disease and many others.   Cystic Fibrosis (CF) affects both males and females and people from all racial and ethnic  groups. However, the disease is most common among Caucasians of Northern  descent. CF is also common among Latinos and American Indians. The disease is less common among  Americans and  Americans. More than 10 million Americans are carriers of a faulty CF gene and many of them don't know that they are CF carriers. One or both parents can be tested any time before or during pregnancy.  Talk to your care provider about your family history and whether you should be screened. A referral to a genetic counselor at [facility] or [facility] can be made by your care provider at any time.  This is also tested for in the  Metabolic Screen that your infant receives 24 hours after birth.     Fetal DNA cell Testing: [place] or Innatal (Non-Invasive Prenatal Testing)  At 10 wk or greater, a blood sample can be drawn here at clinic or at any of our referral offices. It will provide highly accurate results with low false positive rates for trisomy 18, trisomy 21 (Down Syndrome), and trisomy 13 (> 99% trisomy detection rate at a false positive rate of <0.1%). Gender identification can also be obtained if desired (98% accuracy).  Can also detect some sex-linked chromosomal abnormalities (80-90% accuracy).  This is often done if one of the other screening tests is abnormal.        1st Trimester Screening:   Everyone has an age related risk of having a baby with a genetic abnormality. This testing provides a risk profile which is better than assigning risk based solely on your age.  Refines your risk of having a baby with a chromosomal abnormality such as Trisomy 21 & 18.  This test with detect a fetus with one of these disorders about 85% of the time.  A thickened nuchal fold can also be associated with cardiac defects.  This test requires a blood collection combined with ultrasound to obtain a measurement of fluid at back of baby s neck (nuchal translucency).  False positive results occur  approximately 5% of cases.  An additional blood sample may be necessary in order to calculate your risk of having a baby with a neural tube defect (e.g. spina bifida).  This is called the AFP test (alpha fetal protein).  An ultrasound should be able to  any spinal defect as well.  Follow-up of an abnormal test may include a more extensive ultrasound study and you may be offered an amniocentesis (a small sample of amniotic fluid is withdrawn and studied) for a definitive diagnosis    Quad Screen (4-marker screen)  Between 15 and 21 weeks, a sample of blood can be drawn at our lab to assess your risk of having a baby with Down Syndrome, Trisomy 18 and neural tube defects. Such testing is able to detect these conditions in 80% of cases and the false-positive rate is approximately 5%.   Follow-up of an abnormal test may include a more extensive ultrasound study and you may be offered an amniocentesis (a small sample of amniotic fluid is withdrawn and studied) for a definitive diagnosis      Referrals opportunities include:  [facility],  [facility]  Offers nuchal translucency ultrasound; does not offer genetic counseling.  [place]  [facility] and [facility] ([facility]):   Approximately an hour long visit includes 30 min with genetic counselor who discusses all testing available and which ones might be beneficial to you based on age, personal and family history.    Blood will be drawn and the nuchal translucency ultrasound will be discussed and performed if desired. The Free Fetal DNA testing ([place]/[employer]) can be drawn also.  Targeted or detailed Level II ultrasounds are also available with these perinatology groups.        Breastfeeding: a Healthy Option for You and Your Baby  Consider breastfeeding for the healthiest way to feed your baby. Ask your midwife or physician for more information.     The choice of how you will feed your baby is  important.  Before your baby s birth, you ll want to learn about the benefits of breastfeeding.  [facility] have been designated Baby Friendly; an initiative that was created by the World Health Organization and UNICEF.  This helps give you and your baby the best start in feeding their baby.    Why should I breastfeed my baby?   Babies are less likely to develop childhood obesity or diabetes   Babies are less likely to suffer from recurrent ear infections   Babies are less likely to be hospitalized for respiratory conditions   Breast milk is rich in nutrients and antibodies-it is easy to digest    How does it benefit me?   Lowers the risk for diabetes, breast and ovarian cancer and postpartum depression   Moms can lose  baby weight  more quickly   Cost savings - formula can cost well over $1,500 per year   Convenient - no bottles and nipples to sterilize, no measuring and mixing formula   The physical contact with breastfeeding can make babies feel secure, warm and comforted     What about formula?  While you and your baby are staying with us at [facility], we will support whatever feeding choice you make for your baby.    Some important considerations:    The American Academy of Pediatrics, the World Health Organization, and many more organizations recommend exclusive breastfeeding for 6 months and continued breastfeeding while adding other foods for the first 1-2 years.    Any amount of breastmilk has benefits to both baby and mother.  Giving formula in replacement of breastfeeding can affect mother s milk supply.  If formula is needed, hospital staff will work with you on a plan to help develop your milk supply.  Formula alters the natural growth of good bacteria in the  stomach.   Research has found that first time mothers who offer formula in the hospital have a shorter duration of breastfeeding.    How can I start to prepare?   Start by having a conversation with your medical provider.   Talk  with your partner, family and friends.   Attend a prenatal class that includes breastfeeding preparation. Birth and breastfeeding classes are offered by [facility]. Visit [employer] for class information.   After your baby s birth, hospital staff and lactation consultants will help you and your baby get off to a great start with breastfeeding.    As your center of gravity and weight changes, use good body mechanics when changing positions and lifting. For example, use a straight back and your legs for support when lifting instead of bending over. Maintain good posture to prevent straining your muscles. Now is a good time to continue or restart your exercise program. Walking 30-60 minutes daily is an excellent way to keep fit. Yoga and swimming also offer many benefits.    The nausea and fatigue of early pregnancy have usually started to let up, so this is a good time to focus on nutrition. Consider attending a nutrition class. A healthy diet includes about 60 grams of protein each day (3-4 servings of dairy, 2-3 servings of meat/fish/poultry/nuts), 4-6 servings of whole grain foods, and 5-6 servings of fruits and vegetables. Remember to drink 6-8 glasses of water daily.    Watch for warning signs, such as   vaginal bleeding  fluid leaking from your vagina  severe abdominal pain  nausea and vomiting more than 4-5 times a day, or if you are unable to keep anything down  fever more than 100.4 degrees F.       RESOURCES   You can refer to the Starting Out Right book or find it online at [email or web address] or [email or web address]/images/[email or web address]/flipbooks/[email or web address]-starting-out-[email or web address]-starting-out-[email or web address]#[email or web address]    You can sign up for a weekly parenting e-mail that gives support, tips and advice from health care professionals that starts with pregnancy and continues through the toddler years. To register, go to  [email or web address] at any time during your pregnancy.    Breastfeeding:    OUTPATIENT LACTATION RESOURCES     -Schedule an appointment with a [facility]wives [facility] CN who is also a Lactation Consultant by calling [phone]. We see women for breastfeeding visits at [facility] and [facility].     Chocolate Milk Club:  [email or web address]  Join the Facebook group or join us for support on the first Monday of each month from 7 to 9 p.m.  , Dr. Yung Mcpherson, DNP, CNM, CNP, IBCLC, ICEA  Phone: [phone]  Fax: [phone]  Email: [email or web address]    R.O.S.E. = Reaching our Sisters Everywhere  [email or web address]    Black Women Do Breastfeed Blog  [email or web address]    Club Mom breastfeeding support for Black mothers:  Contact Geraldo Wyatt  Phone: [phone]   Email:  [email or web address].     Zeenat Warner  Phone: [phone]   Email:  [email or web address] Dad parent support for Black fathers:   Contact Marques Oneill   Phone: [phone]   Email:  [email or web address].    The [employer] Breastfeeding Coalition is a wonderful support for [facility] women who are breastfeeding.  They are best found on Facebook.      The [employer] Breastfeeding Coalition has produced a collection of video stories about breastfeeding in the ong community, produced by the [employer] Breastfeeding [employer].  Most are in English, but one on handling breastmilk is in ong.  The video collection is in the middle of the page.    This page also has several other resources on Hmong breastfeeding.     https://[email or web address]/communities/    WIC program application: [place] Kad[place]   Https://[email or web address]/watch?v=lQoWwPoyGYc    For information on Local WIC services call:  [phone]    You may qualify...  If you are pregnant, nursing, or  have a child under age 5, we encourage you to Apply for WIC.    WIC Provides...  Nutrition tips and advice  Support for breastfeeding  Healthy foods like fresh fruits and vegetables, whole grain cereals, bread and tortillas, low fat milk, and baby foods  Caring and supportive staff  Don't delay! We're here to help!  CALL TODAY FOR A WIC CLINIC NEAR YOU  [phone] or [phone]    Resource/WIC/[facility] health improvement partnership:     -Baby Café  Find a Baby Café - Baby Café [place] (babycafeusa.transOMIC)   Search by State ([place]) to find the nearest cafe to you      [facility] Baby Café  Due to COVID-19, all Baby Café sessions are canceled until further notice. For lactation support, please contact one of our bilingual staff:  Dayanara (IBCLC) [phone]  Maame (IBCLC/ Angolan) [phone]  Leoncio (Hmong) [phone]  Yfn (Syrian) [phone]  Baby Café is a free, drop-in service offering breastfeeding/chestfeeding support. Come share tips and socialize with other pregnant, breastfeeding/chestfeeding families. Babies and siblings are welcome (no  available).  We offer:  Professionally trained lactation staff.  Resource books for lending.  Relaxed and fun atmosphere.  Refreshments.  Locations  Baby Café is offered at several locations.  Please see below for the Baby Café closest to you.  CANCELED UNTIL FURTHER NOTICE  [facility]  [address], [facility]  1st Wednesdays of the month   10 a.m. - Noon  CANCELED UNTIL FURTHER NOTICE  [facility] [address], [place], [address]  4th Wednesdays of the month   10 a.m. - 12:30 p.m.     CANCELED UNTIL FURTHER NOTICE  [facility]  [address], [place], [address]  4th Wednesdays of the month  4 - 6 p.m.  CANCELED UNTIL FURTHER NOTICE  [facility]  [address], [place], [facility]  2nd Thursdays of the month.  9:30-11:30 a.m.  Enter through the east end of the  building, the blue Door C.  Ring the ECFE buzzer to be let in.   More information  Maameviviana Linares  [phone]  [email or web address].     -Attend a baby weigh in at [facility].  Lactation consultants are available to answer questions  [place]: Tuesdays 1:00 - 2:00  [facility], [facility]: Mondays 1:00 - 2:00   [email or web address]    -Attend one of the New Mama groups at [facility] in [facility].  [facility] also offers one-on-one in home and in office lactation consults.   www.[facility] Hospital[facility]el[facility] Learning    -Attend a LeLeche League meeting.  Multiple groups in several locations throughout the [facility]. The meetings are no-cost and always informative breastfeeding education session through [facility] La Leche Legia  Www.lllondas.org/     Held at [facility] the second Thursday of each month at 7pm    Childbirth and Parenting Education:       Everyday Miracles:   https://www.everyday-miracles.org/    Free Video Series from [facility]: https://nursing.[facility]/academics/specialty-areas/nurse-midwifery/having-baby-prenatal-videos/having-baby-prenatal-and    [facility]: http://[facility] Stra[facility] HospitalGuguchu/   [phone]  [place]: (education, yoga & wellness) www.CashEdge  [facility]: www.HousebitesT[facility] RiverAdelja Learning   Childbirth collective: (Parent topic nights)  www.childbirthcollective.org/  Hypnobabies:  www.hypnobabiestwincities.com/  Hypnobirthing:  Http://hypnobirthing.com/  The Birth Hour: https://thebirthhour.Optimenga777/online-childbirth-class/    APPS and Podcasts:   [employer]    Evidence Based Birth  The Birth Hour (for birth stories)   Birthful   Expectful   The Longest Shortest Time  PregnancyPodcast Nehal Shankar    Book Recommendations:   Peyton [place]'s Birthing From Within--first few chapters include a new-age tone, you may prefer to skip it and keep going, because there is good stuff later.  This book recommendation covers emotional preparation,  "but does cover coping with pain, and use of both pharmacological and nonpharmacological methods.    Dr. Garza' The Pregnancy Book and The Birth Book--the pregnancy book goes month-by month      The Birth Partner by Maru Kimbrough    Womanly Art of Breastfeeding by La Leche League International   Bestfeeding by Karen Bailey--great pictures    Mothering Your Nursing Toddler, by Giulia Gomez.   Addresses dealing with so many of the challenging behaviors of a nursing toddler.  How Weaning Happens, by La Leche League.  Discusses weaning at all ages, from medically necessary weaning of an infant, all the way up to age 5 (or older), with why/why not, and strategies.  Very empowering book both for deciding to wean and deciding not to.    American College of Nurse-Midwives (ACNM) http://www.midwife.org/; look at the informational handouts at http://www.midwife.org/Share-With-Women     www.mymidwife.org    Mother to Baby (Medication and Herbal guidance in pregnancy): http://www.mothertobaby.org  Toll-Free Hotline: 768.244.8243  LactMed (Medication use while breastfeeding): http://toxnet.nlm.nih.gov/newtoxnet/lactmed.htm    Women's Health.gov:  http://www.womenshealth.gov/a-z-topics/index.html    American pregnancy association - http://americanpregnancy.org    Centering Pregnancy (group prenatal care option): http://centeringhealthcare.org    Information about doulas:  Childbirth collective: http://www.childbirthcollective.org/  Doulas of [place] (JONAS):  www.jonas.org  [facility]  project: http://twincitiesdoulaproject.com/     Early Childhood and Family Education (ECFE):  ECFE offers parents hands-on learning experiences that will nourish a lifetime of teachable moments.  http://ecfe.info/ecfe-home/    March of Dimes www.happyview     FDA - Nutrition  www.mypyramid.gov  Under \"For Consumers,\" click on \"pregnant and breastfeeding women.\"      Centers for Disease Control and Prevention (CDC) - Vaccines " : http://www.cdc.gov/vaccines/       When researching information on the web, question the validity of websites.  The Coub .gov, .edu and.org tend to be more reliable information.  If there are a lot of advertisements, be cautious of the information provided. Stay away from blogs and chat rooms please!      Nutrition & supplements:     Prenatal vitamin (those with 600-1000 mcg folic acid and 27 mg of iron are enough).  Take with food or Juice     4-5 servings of dairy or other calcium rich foods (fish, leafy greens, soy) per day - if not, take 500-1000 mg additional calcium (Tums, pills, chews). Take with dairy     Vitamin D3 5117-7113 IU geltab daily.  Take with fattiest meal.  Look for fortified foods also (Dairy, Juice)     2-3 (4) oz servings of fish, seafood, nuts (walnuts & almonds), oils, avocado per week - if not, take Omega 3 Fatty acids: DHA & TRENT 2136-1425 mg per day.  Other names: cod liver oil, fish oil. Take with fattiest meal.  Some prenatals have DHA, but typically not a sufficient dose.    Fish: Do not eat shark, swordfish, nurys mackerel, or tilefish when you are pregnant or breastfeeding.  They contain high levels of mercury.  Limit white (albacore) tuna to no more than 6 ounces per week. Http://www.fda.gov/downloads/ForConsumers/ConsumerUpdates/EPE050314.pdf         Touring the Maternity Care Center  At this time we are offering a virtual tour of the Maternity Care Centers at both [facility] and [facility]:   [facility]: https://www.RapaZapp interactive studios.org/Locations/[facility]/Maternity-Care-Center  [facility]:   https://RapaZapp interactive studios.org/overarching-care/the-birthplace/tours  https://www.RapaZapp interactive studios.org/Locations/[facility]/Maternity-Care-Center/#virtual_tour  When in person tours become available, registrations is required. To schedule a tour at either [facility] or [facility], please do so online using the following links:  [facility]  [email or web address]/[email or web address]?[email or web address]&[email or web address]&[email or web address]&[email or web address]&[email or web address]&ps=1&group=37&it=1&ach=955  [facility] - [email or web address]://[email or web address].com/registerlist.asp?s=[email or web address]&[email or web address]&[email or web address]&p=2&[email or web address]&ps=1&group=38&it=1&dlp=395     You are invited to  Meet the [place] [facility]    Virtual:   https://www.[facility] Center[facility] Helmets.org/classes-and-events/meet-the-midwives-from-[facility]    A way to tour the hospital Labor and Delivery unit and meet the midwives in our group was postponed at the start of hospital restrictions following COVID-19. We will resume these when able.    Please call [phone] for ongoing updates.

## 2022-04-18 NOTE — PROGRESS NOTES
Octavio is here alone for fetal heart tone check at 12 weeks and 3 days. Reviewed IOB labs.  Completing BV treatment today.  Has noticed her discharge has normalized. No quickening or pregnancy discomforts at this time.  Nausea has improved.  Questions answered about pregnancy nutrition and weight gain.  In the past has struggled with low appetite after her mom's death 2 years ago.  Has some concerns about gaining too little weight in pregnancy but also has concerns that she may gain too much.  Supportive listening provided.  Principles of nutrition in pregnancy reviewed.  Partner graduating from basic training at the end of this month.  We will then go on to more training in [place] with plans to return to the [facility] early summer.  Early second trimester pregnancy anticipatory guidance reviewed.  Has not yet started baby aspirin but will plan to today. Enc follow-up in 4 weeks or sooner prn.  Next visit: Offer AFP and discussed anatomy ultrasound.

## 2022-05-14 NOTE — PATIENT INSTRUCTIONS
"[facility]- Contact information:  Appointment line and to get a hold of CNM in clinic Monday-Friday 8 am - 5 pm:  [phone].  There are some clinics with early start times (1st appointment 7:40 am) and others with evening hours (last appointment 6:20 pm).  Most are typically open from 8 am to 5 pm.    CNM on call answering service: [phone].  Specify your hospital of choice and leave a brief message for CNM;  will then page CNM who is on call at your specified hospital and you should receive a call back with 15 minutes.  Be sure that your ringer is audible and that you can accept blocked calls so that we can get back in touch with you! This number should be reserved for urgent needs if during the day, before 8 am, after 5 pm, weekends, holidays.    Contact the on-call CNM with warning signs, such as:  vaginal bleeding   Vaginal discharge and itching or pain and burning during urination  Leg/calf pain or swelling on one side  severe abdominal pain  nausea and vomiting more than 4-5 times a day, or if you are unable to keep anything down  fever more than 100.4 degrees F.     Communicadohart  After each of your visits you are welcome to check [employer] for your visit summary including education and links to information relevant to your pregnancy and/or well woman care.   Find the \"Visits\" tab at the top of the page, you will see a list of recent visits and for each visit a for link for \"View After Visit Summary.\" View of your After Visit Summary will allow you to read our recommendations from your visit, review any education provided, and link to websites with useful information.   If you have any questions or difficulty navigating [facility], please feel free to contact us and we will do our best to direct you.        Touring the Maternity Care Center  At this time we are offering a virtual tour of the Maternity Care Centers at both [facility] and [facility]:   [facility]: " "https://www.[facility].org/Locations/[facility]/Maternity-Care-Center  [place]:   https://Paper.li.org/[facility]/the-birthplace/tours  https://www.[place].org/Locations/[facility]/Maternity-Care-Center/#virtual_tour  When in person tours become available, registrations is required. To schedule a tour at either [place] or [facility], please do so online using the following links:  [facility] - https://www.[employer].UVLrx [employer]/registerlist.asp?s=6&m=303&vs=5&p=2&fihgt=511&ps=1&group=37&it=1&ocv=827  [facility] - https://www.Symphogen/registerlist.asp?s=6&m=303&vs=5&p=2&mvjnh=041&ps=1&group=38&it=1&ndr=359         Meet the Midwives from [facility]  You are invited to an informational meet and greet with [facility] Certified Nurse-Midwives. Our free \"Meet the Midwives\" event is a great opportunity to learn about our midwives' philosophy and experience, the hospitals where we can assist with your birth, and answer questions you may have. Partners, friends, and family are welcome to attend. Currently, this is a virtual event.  Date  First Tuesday of every month at 7 pm.    Link to next (live) meeting  https://www.[place].org/classes-and-events/meet-the-midwives-from-[facility]  To Join by Telephone (audio only) Call:   [phone] Phone Conference ID: [phone]#        Ultrasound Appointment:   Don't forget to schedule your ultrasound appointment around 20 weeks into your pregnancy. Your midwife will order the exam for you to schedule at [phone] with [facility] radiology locations or at the independent radiology clinic of your preference.      GENETIC SCREENING OPTIONS AT [facility]        All testing is optional. We don t recommend or discourage any test; it is totally up to you and your partner. Some couples wish to know their risk of having a baby with a genetic defect and others do not. We will " support your decision. Abnormal results may lead to a discussion of options for further testing.  Accurate dating of your pregnancy is important for all testing so an ultrasound may be done prior to referral or testing.  No testing provides certainty; there are false positives and negatives associated with all testing, some more than others.  Most genetic testing is non-invasive (requires only a blood sample and sometimes an ultrasound or both).  It is always wise to check with your insurance carrier before proceeding.  Some testing can be done at our lab and some require a referral.  If you decide to do no testing, the 20 week ultrasound scan has some ability to detect abnormalities in the baby.    [place] or [employer]  At 10 wk or greater, a blood sample can be drawn here at clinic or at any of our referral offices. It will provide highly accurate results with low false positive rates for trisomy 18, trisomy 21 (Down Syndrome), and trisomy 13 (> 99% trisomy detection rate at a false positive rate of <0.1%). Gender identification can also be obtained if desired.    Quad Screen (4-marker screen)  Between 15 and 21 weeks, a sample of blood can be drawn at our lab to assess your risk of having a baby with Down Syndrome, Trisomy 18 and neural tube defects. Such testing is able to detect these conditions in 80-90% of cases and the false-positive rate is approximately 5%.     Why is dental care in pregnancy important?  During pregnancy, you are more likely to have problems with your teeth or gums. If you have an infection in your teeth or gums, the chance of your baby being premature (born early) or having low birth weight may be slightly higher than if your teeth and gums are healthy  Dental care is safe during pregnancy and important for the health of you and your baby.   What should you know before you see the dentist?  Make sure your dentist knows that you are pregnant.  If medications for infection or for pain are  needed, your dentist can prescribe ones that are safe for you and your baby.  Tell your dentist about any changes you have noticed since you became pregnant and about any medications r or supplements you are taking.  Routine x-rays should be avoided in pregnancy, but it may be necessary if there is a problem or an emergency.   Your body should be covered with a lead apron to protect you and your baby.  Dental work can be done safely at any point in pregnancy. If possible, it is best to delay treatments and pro- cedures until after the first trimester.    For more information on dental health in pregnancy: http://onlinelibrary.gabriel.com/store/10.1111/jmwh.94286/asset/kzpq77460.pdf?v=1&t=qsis1s53&e=44[email or web address]     Quickening:   Your Baby in the Second Trimester of Pregnancy  At the start of the second trimester, you will feel your baby's movements, which get stronger as the baby grows bigger. At the end of the fourth month, your baby weighs about five ounces. Her kidneys begin to produce urine. During visits to your health care provider, you will be able to hear your baby's heartbeat more clearly. Your baby can move and hear your voice.   By the end of the fifth month, you'll be able to feel light movements (called quickening) of your fetus. Your baby is sleeping and waking at regular intervals, and is more active than before. At this point, she is about nine inches long and weighs about one-half to one pound. During the sixth month, your baby's features become clearer. Eyebrows, eyelashes, and hair are developing. She also has finger and toe prints, and may be kicking strongly.  By the end of the second trimester, your baby weighs as much as two pounds and is about 11 inches long.         Gestational diabetes  Gestational diabetes develops during pregnancy (gestation). Like other types of diabetes, gestational diabetes affects how your cells use sugar (glucose). Gestational diabetes  causes high blood sugar that can affect your pregnancy and your baby's health.  Any pregnancy complication is concerning, but there's good news. Expectant moms can help control gestational diabetes by eating healthy foods, exercising and, if necessary, taking medication. Controlling blood sugar can prevent a difficult birth and keep you and your baby healthy.  In gestational diabetes, blood sugar usually returns to normal soon after delivery. But if you've had gestational diabetes, you're at risk for type 2 diabetes. You'll continue working with your health care team to monitor and manage your blood sugar.    Who is at risk?  This is a list of factors that increase the risk of developing gestational diabetes for women during pregnancy:      Overweight prior to pregnancy (20% or more over ideal body weight)      High risk ethnic group: , , ,       Impaired glucose tolerance or traces of glucose in the urine      Family history of diabetes      Previously giving birth to a baby over 9 lbs. or stillborn      Previous pregnancy with gestational diabetes    Prevention:  There are no guarantees when it comes to preventing gestational diabetes -- but the more healthy habits you can adopt before pregnancy, the better. If you've had gestational diabetes, these healthy choices may also reduce your risk of having it in future pregnancies or developing type 2 diabetes down the road.  Eat healthy foods. Choose foods high in fiber and low in fat and calories. Focus on fruits, vegetables and whole grains. Strive for variety to help you achieve your goals without compromising taste or nutrition. Watch portion sizes.   Keep active. Exercising before and during pregnancy can help protect you from developing gestational diabetes. Aim for 30 minutes of moderate activity on most days of the week. Take a brisk daily walk. Ride your bike. Swim laps.  If you can't fit a single 30-minute workout  into your day, several shorter sessions can do just as much good. Park in the distant lot when you run errands. Get off the bus one stop before you reach your destination. Every step you take increases your chances of staying healthy.  Lose excess pounds before pregnancy. Doctors don't recommend weight loss during pregnancy. But if you're planning to get pregnant, losing extra weight beforehand may help you have a healthier pregnancy.  Focus on permanent changes to your eating habits. Motivate yourself by remembering the long-term benefits of losing weight, such as a healthier heart, more energy and improved self-esteem.    Preventing Diabetes after Pregnancy:  It is estimated that 35-60 percent of women that have had gestational diabetes will develop type 2 diabetes in the future. It is also thought that children from these pregnancies have a greater chance of developing obesity and type 2 diabetes.    If you do have prediabetes or have risk factors for having diabetes, research shows that doing just two things can help you prevent or delay type 2 diabetes: Lose 5% to 7% of your body weight, which would be 10 to 14 pounds for a 200-pound person; and get at least 150 minutes each week of physical activity, such as brisk walking.    RESOURCES   You can refer to the Starting Out Right book or find it online at http://www.[facility].org/images/stories/maternity/[facility]-Starting-Out-Right.pdf p  You can sign up for a weekly parenting e-mail that gives support, tips and advice from health care professionals that starts with pregnancy and continues through the toddler years. To register, go to www.[facility].org/baby at any time during your pregnancy.    Breastfeeding Information:  OUTPATIENT LACTATION RESOURCES    -Schedule a clinic appointment with a [facility] with dedicated clinic hours for breastfeeding assistance by calling [phone]. Breastfeeding clinic visits are at  [facility] on Wednesdays, [facility] on Tuesdays and [place] [facility] on Thursdays.       [facility] Baby Café  Due to COVID-19, all Baby Café sessions are canceled until further notice. For lactation support, please contact one of our bilingual staff:  Dayanara (IBCLC) [phone]  Maame (IBCLC/ Afghan) [phone]  Leoncio (Hmong) [phone]  Yfn (Ethiopian) [phone]  Baby Café is a free, drop-in service offering breastfeeding/chestfeeding support. Come share tips and socialize with other pregnant, breastfeeding/chestfeeding families. Babies and siblings are welcome (no  available).  We offer:  Professionally trained lactation staff.  Resource books for lending.  Relaxed and fun atmosphere.  Refreshments.  Locations  Baby Café is offered at several locations.  Please see below for the Baby Café closest to you.  CANCELED UNTIL FURTHER NOTICE  [facility]  [address] [address]  1st Wednesdays of the month   10 a.m. - Noon  CANCELED UNTIL FURTHER NOTICE  [facility] [address], [place], [address]  4th Wednesdays of the month   10 a.m. - 12:30 p.m.     CANCELED UNTIL FURTHER NOTICE  [facility]  [address], [place], [address]  4th Wednesdays of the month  4 - 6 p.m.  CANCELED UNTIL FURTHER NOTICE  [facility] [address], [place], [address]  2nd Thursdays of the month.  9:30-11:30 a.m.  Enter through the east end of the building, the blue Door C.  Ring the ECFE buzzer to be let in.   More information  Maame Linares  [phone]  [email or web address]     -Attend a baby weigh in at [facility].  Lactation consultants are available to answer questions  [place]: Tuesdays 1:00 - 2:00  [facility]: Mondays 1:00 - 2:00   www.[facility] HospitalCallResto.[employer]    -Attend one of the New Mama groups at [facility].  [facility] also offers one-on-one in home and in  office lactation consults.   www.[employer]    -Attend a Albino Peña meeting.  Multiple groups in several locations throughout the [facility]. The meetings are no-cost and always informative breastfeeding education session through Internatal La Leche League  Www.llcelinafmndas.org/  Medication use while breastfeeding: http://toxnet.nlm.nih.gov/newtoxnet/lactmed.htm     Childbirth and Parenting Education:     Everyday Miracles:   https://www.everyday-miracles.org/    Free Video Series from [facility]: https://nursing.[facility]/academics/specialty-areas/nurse-midwifery/having-baby-prenatal-videos/having-baby-prenatal-and    [facility]: http://Close.ioHealthBridge Children's Rehabilitation HospitalPopUp Leasing/   (679) 124-BABY  [place]: (education, yoga & wellness) www.Demeure  Enlightened Mama: www.iComputing Technologies   Childbirth collective: (Parent topic nights)  www.childbirthcollective.org/  Hypnobabies:  www.hypnobabiestwincities.The ADEX/  Hypnobirthing:  Http://hypnobirthing.com/  The Birth Hour: https://orderTalk/online-childbirth-class/    APPS and Podcasts:   Ace Baxter Nurture    Evidence Based Birth  The Birth Hour (for birth stories)   Birthful   Expectful   The Longest Shortest Time  PregnancyPodcast Nehal Shankar    Book Recommendations:   Peyton [place]'s Birthing From Within--first few chapters include a new-age tone, you may prefer to skip it and keep going, because there is good stuff later.  This book recommendation covers emotional preparation, but does cover coping with pain, and use of both pharmacological and nonpharmacological methods.    Dr. Garza' The Pregnancy Book and The Birth Book--the pregnancy book goes month-by month    The Birth Partner by Maru Kimbrough    Womanly Art of Breastfeeding by La Leche League International   Bestfeeding by Karen Bailey--great pictures    Mothering Your Nursing Toddler, by Giulia Gomez.   Addresses dealing with so many of the challenging behaviors of a  "nursing toddler.  How Weaning Happens, by La Leche League.  Discusses weaning at all ages, from medically necessary weaning of an infant, all the way up to age 5 (or older), with why/why not, and strategies.  Very empowering book both for deciding to wean and deciding not to.    American College of Nurse-Midwives (ACNM) http://www.midwife.org/; look at the informational handouts at http://www.midwife.org/Share-With-Women     www.mymidwife.org    Mother to Baby (Medication and Herbal guidance in pregnancy): http://www.mothertobaby.org  Toll-Free Hotline: 920.453.6859  LactMed (Medication use while breastfeeding): http://toxnet.nlm.nih.gov/newtoxnet/lactmed.htm    Women's Health.gov:  http://www.womenshealth.gov/a-z-topics/index.html    American pregnancy association - http://americanpregnancy.org    Centering Pregnancy (group prenatal care option): http://centeringhealthcare.org    Information about doulas:  Childbirth collective: http://www.childbirthcollective.org/  Doulas of [place] (JONAS):  www.jonas.org  [facility]  project: http://twincitiesdoulaproject.com/     Early Childhood and Family Education (ECFE):  ECFE offers parents hands-on learning experiences that will nourish a lifetime of teachable moments.  http://ecfe.info/ecfe-home/    March of Dimes www.Energy Micro.com     FDA - Nutrition  www.mypyramid.gov  Under \"For Consumers,\" click on \"pregnant and breastfeeding women.\"      Centers for Disease Control and Prevention (CDC) - Vaccines : http://www.cdc.gov/vaccines/       When researching information on the web, question the validity of websites.  The domains .gov, .edu and.org tend to be more reliable information.  If there are a lot of advertisements, be cautious of the information provided. Stay away from blogs and chat rooms please!    "

## 2022-05-14 NOTE — PROGRESS NOTES
Octavio is here alone for a routine prenatal visit at 16w3d.  She is feeling well.  No quickening yet.  Accepts referral for anatomy ultrasound at [facility] radiology.  Worried about her weight gain.  Interval weight gain 14 pounds in 4 weeks.  Diet recall performed.  Has been eating out.  Drinks: Heavy on the sweet teas.  Not doing any formal exercising or walking.  Discussed ways to make small changes to hopefully slow weight gain.  Declines referral for nutrition consultation.  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  It resolved spontaneously.  Reviewed recommendation to lift no more than 25 pounds during pregnancy.  Letter written and encouraged her to go to employer.  Fidel is graduating from basic training on June 24 and then he will return home after that.  Disc option for genetic screening: single marker AFP, patient declines.   Second trimester pregnancy anticipatory guidance reviewed.     Enc follow-up in 4 weeks or sooner prn.

## 2022-05-16 ENCOUNTER — PRENATAL OFFICE VISIT (OUTPATIENT)
Dept: MIDWIFE SERVICES | Facility: CLINIC | Age: 22
End: 2022-05-16
Payer: COMMERCIAL

## 2022-05-16 VITALS
DIASTOLIC BLOOD PRESSURE: 52 MMHG | HEART RATE: 72 BPM | WEIGHT: 196 LBS | SYSTOLIC BLOOD PRESSURE: 106 MMHG | BODY MASS INDEX: 34.45 KG/M2

## 2022-05-16 DIAGNOSIS — Z34.00 SUPERVISION OF NORMAL FIRST PREGNANCY, ANTEPARTUM: Primary | ICD-10-CM

## 2022-05-16 PROBLEM — B96.89 BACTERIAL VAGINOSIS: Status: RESOLVED | Noted: 2022-04-12 | Resolved: 2022-05-16

## 2022-05-16 PROBLEM — N76.0 BACTERIAL VAGINOSIS: Status: RESOLVED | Noted: 2022-04-12 | Resolved: 2022-05-16

## 2022-05-16 PROCEDURE — 99207 PR PRENATAL VISIT: CPT | Performed by: ADVANCED PRACTICE MIDWIFE

## 2022-05-16 NOTE — LETTER
May 16, 2022    To Whom It May Concern:    Sudarshan Rondontim Fierro is being seen in our clinic for prenatal care.  Her Estimated Date of Delivery: Oct 28, 2022.  She is currently 16 weeks. She should not lift anything heavier than 25 lbs.     Please contact me with any questions or concerns.       Sincerely,        MARVIN Rios, CAROLE, IBCLC  [facility]  Midwifery

## 2022-06-20 ENCOUNTER — PRENATAL OFFICE VISIT (OUTPATIENT)
Dept: MIDWIFE SERVICES | Facility: CLINIC | Age: 22
End: 2022-06-20
Payer: COMMERCIAL

## 2022-06-20 VITALS
HEART RATE: 72 BPM | DIASTOLIC BLOOD PRESSURE: 68 MMHG | WEIGHT: 200 LBS | BODY MASS INDEX: 35.15 KG/M2 | SYSTOLIC BLOOD PRESSURE: 106 MMHG

## 2022-06-20 DIAGNOSIS — Z71.89 COUNSELING AND COORDINATION OF CARE: ICD-10-CM

## 2022-06-20 DIAGNOSIS — Z91.89 AT RISK FOR COMPLICATION OF PREGNANCY: ICD-10-CM

## 2022-06-20 DIAGNOSIS — Z34.00 SUPERVISION OF NORMAL FIRST PREGNANCY, ANTEPARTUM: Primary | ICD-10-CM

## 2022-06-20 PROCEDURE — 99207 PR PRENATAL VISIT: CPT | Performed by: ADVANCED PRACTICE MIDWIFE

## 2022-06-20 NOTE — PATIENT INSTRUCTIONS
"[facility] Nurse [facility] Contact information:  Appointment line and to get a hold of CNM in clinic Monday-Friday 8 am - 5 pm:  [phone].  There are some clinics with early start times (1st appointment 7:40 am) and others with evening hours (last appointment 6:20 pm).  Most are typically open from 8 am to 5 pm.    CNM on call answering service: [phone].  Specify your hospital of choice and leave a brief message for CNM;  will then page CNM who is on call at your specified hospital and you should receive a call back with 15 minutes.  Be sure that your ringer is audible and that you can accept blocked calls so that we can get back in touch with you! This number should be reserved for urgent needs if during the day, before 8 am, after 5 pm, weekends, holidays.    Contact the on-call CNM with warning signs, such as:  vaginal bleeding   Vaginal discharge and itching or pain and burning during urination  Leg/calf pain or swelling on one side  severe abdominal pain  nausea and vomiting more than 4-5 times a day, or if you are unable to keep anything down  fever more than 100.4 degrees F.   BackupAgenthart  After each of your visits you are welcome to check Appbistro for your visit summary including education and links to information relevant to your pregnancy and/or well woman care.   Find the \"Visits\" tab at the top of the page, you will see a list of recent visits and for each visit a for link for \"View After Visit Summary.\" View of your After Visit Summary will allow you to read our recommendations from your visit, review any education provided, and link to websites with useful information.   If you have any questions or difficulty navigating [email or web address], please feel free to contact us and we will do our best to direct you.  Meet the Midwives from [facility]  You are invited to an informational meet and greet with [facility] Certified Nurse-Midwives. Our free " "\"Meet the Midwives\" event is a great opportunity to learn about our midwives' philosophy and experience, the hospitals where we can assist with your birth, and answer questions you may have. Partners, friends, and family are welcome to attend. Currently, this is a virtual event.  Date  First Tuesday of every month at 7 pm.    Link to next (live) meeting  https://www.[facility].[employer]/classes-and-events/meet-the-midwives-from-[facility]  To Join by Telephone (audio only) Call:   [phone] Phone Conference ID: [phone]#      UNDERSTANDING  LABOR    Going into labor before your 37th week of pregnancy is called  labor.  labor can cause your baby to be born too soon. This can lead to a number of health problems that may affect your baby. From 28-35 weeks, Patients are advised to be evaluated at [facility] since they have a  Intensive Care Unit (NICU).  -Before labor, the cervix is thick and closed.  -In  labor, the cervix begins to efface (thin) and dilate (open) over a short period of time    Symptoms of  Labor  If you believe you re having  labor, contact the midwives right away. But contractions alone don t mean you re in  labor. What matters more are changes in your cervix (the lower end of the uterus). Symptoms of  labor include:  five or more contractions per hour  Strong & frequent contractions  Constant menstrual-like cramping  Low-back pain  Mucous or bloody vaginal discharge  Bleeding or spotting in the second or third trimester    Evaluating  Labor  Your midwife will try to find out whether you re in  labor or whether you re just having contractions.She may watch you for a few hours. The following tests may be done:  Pelvic exam to see if your cervix has effaced (thinned) and dilated (opened)  Uterine activity monitoring to detect contractions  Fetal monitoring to check the health of your " baby  Ultrasound to check your baby s size and position    Caring for Yourself At Home  If you have contractions  but your cervix is still thick and closed, the midwife may ask you to do the following at home:  Drink plenty of water.  Do fewer activities.  Rest in bed on your side.  Avoid intercourse and nipple stimulation.    When to Call Your Midwife  Five or more contractions per hour  Bag of water breaks  Bleeding or spotting     If You Need Hospital Care   labor often requires that you have hospital care and complete bed rest. You may have an IV (intravenous) line to get fluids. And you may be given pills or an injection to help prevent contractions. Finally, you may receive medication (corticosteroids) that helps your baby s lungs mature more quickly.    Are You At Risk?  Any pregnant woman can have  labor. It may start for no reason. But these risk factors can increase your chances:  Past  labor or past early birth  Smoking and drug or alcohol use during pregnancy  Multiple fetuses (twins or more)  Problems with the shape of the uterus  Bleeding during the pregnancy    The Dangers of  Birth  A baby born too soon may have health problems. This is because the baby didn t have enough time to mature. The baby then is at risk of:  Not breastfeeding well  Having immature lungs  Bleeding in the brain  Dying    Reaching Term  Your goal is to get as close to term as you can before giving birth. The closer you get to term, the higher your chance of having a healthy baby. Work with your healthcare provider. Together, you can take steps that may keep you from giving birth too early.        Testing for Gestational Diabetes in Pregnancy  [facility] are committed to providing safe care during your pregnancy. We follow the recommendations of the American Diabetes Association and the American College of Obstetricians and Gynecologists to test all pregnant women for gestational  diabetes. Testing early in pregnancy (if you have risk factors) and testing all women between 26-28 weeks follows local and national guidelines for care during pregnancy. Clients who feel that they cannot consent to such testing, may choose to transfer their care to our consultant obstetricians.  What is the test?  Eat normally on the day of the test; a diet rich in protein, whole grains, and vegetables would be best. Avoid simple sugars, white flour products and juices prior to testing.  You will be asked to drink a 10 oz glucose beverage (50 gm, about the same as a can of root beer).  The product is not carbonated as it has to be consumed within 5 minutes. During the next hour, you are seen for a prenatal visit, but are asked to limit your activity around the clinic. Feel free to bring a book or your computer.   Any level less than 140 for this  glucose challenge test  is considered normal. If measured at 140 to 185, the diagnostic test, a  3 hour glucose tolerance test  will be conducted. If 2 or more readings are abnormal, the diagnosis of gestational diabetes is confirmed and a referral to a diabetes educator will be made. If the level is 185 or above, this confirms the diagnosis and a referral will be made without administering the 3 hour test.  A fasting blood sugar may be performed sometime in the first trimester if you have risk factors for the development of gestational diabetes such as a previous diagnosis or birth of a large baby or a close family relative with diabetes.  What is gestational diabetes?  Your body converts what you eat into glucose. In response to rising blood sugar levels it secretes insulin in order to be able to utilize that glucose. During pregnancy as the placenta grows and matures, it secretes hormones that are necessary to assure baby s growth and development, however, they also reduce the action of insulin in the mother. In some cases too much insulin is blocked (this is called   insulin resistance ) and blood sugar in the mother rises above a normal level. Pregnant women who have never had diabetes before but who have high blood sugar (glucose) levels during pregnancy are said to have gestational diabetes. Gestational diabetes affects about 4-7% of all pregnancies.  What if I have gestational diabetes?  If either of the tests for gestational diabetes show that you have this condition, a referral will be made to visit with the diabetes educator. The educator will help you to make wise food choices, count carbohydrates, monitor your blood sugar and record your levels in a journal. We ask that you bring this diary with you at each prenatal visit. You may meet with the educator several times during the remainder of your pregnancy.  How gestational diabetes can affect your baby  Some mothers may wonder why testing for GDM is delayed until the early part of the 3rd trimester for most women. Gestational diabetes has an impact on the baby at the time of rapid body growth. When the mother s blood has elevated sugar levels, extra glucose crosses the placenta causing the baby to have excess weight gain ( macrosomia ). Some babies are too big to be born vaginally so their mother must have  births. Babies of mothers with uncontrolled gestational diabetes may have difficult births that can cause trauma to the mother and in rare circumstances, babies may suffer fractures or oxygen deprivation during birth. They may have difficulty maintaining their own blood sugar after birth and they may also have trouble adapting to life outside. Recent research indicates that babies of mothers with uncontrolled or undiagnosed gestational diabetes are at risk for obesity and type 2 diabetes. Women who develop gestational diabetes are more likely to develop type 2 diabetes within 15 years after their pregnancy.  Additional Information  The American College of Nurse Midwives (ACNM) provides an information sheet  describing diabetes screening in pregnancy: http://www.womensdocs.com/desiree/pdf/Second_Trimester/Gestational_Diabetes.pdf    You can visit the American Diabetes Association website http://www.diabetes.org for additional information and to purchase their book,  Gestational Diabetes: What to Expect .    A brochure from the American College of Obstetrics and Gynecology is available at:   http://www.acog.org/~/media/For%20Patients/iox673.pdf?dmc=1&mk=[email or web address]  Testing for gestational diabetes is a critical part of your prenatal journey. Thank you for taking good care of yourself and your baby.    HEALTHY PREGNANCY CARE: 22-26 WEEKS PREGNANT    You are finishing your second trimester. Your baby is developing rapidly. At this stage, babies have a sense of balance, can respond to touch, and are recognizing parent voices.  Your baby will be moving around more now.  You may notice Ryan-Romero contractions now, which are painless and prepare the uterus for the delivery.    Nutrition: During this time, you may find that your nausea and fatigue are gone. Overall, you may feel better and have more energy than you did in your first trimester. Be sure you are getting enough calcium and iron in your diet. Your prenatal vitamins cannot supply all of the nutrients you need, so continue to eat 3-4 servings of dairy foods and 2-3 servings of meat/fish/poultry/nuts every day. Foods high in iron include: red meats, eggs, dark green vegetables, dark yellow vegetables, nuts, kidney beans and chickpeas. Some cereals are fortified with iron, so look at the food labels for 100% of the daily requirement for iron.     Discuss your work situation with your midwife or physician as needed. If you stand for long periods of time, you may need to make changes and take breaks.    [place] for childbirth and parenting classes, including an infant CPR class. Breastfeeding classes are recommended too.    Childbirth and Parenting Education:        Everyday Miracles:   https://www.everyday-miracles.org/    Free Video Series from [facility]: https://nursing.[facility]/academics/specialty-areas/nurse-midwifery/having-baby-prenatal-videos/having-baby-prenatal-and    [facility]: http://[facility] Medical CenterDataRPM.[facility]/   [phone]  [place]: (education, yoga & wellness) www.LoanLogicsaHackPad  Enlightened Mama: www.enlightenedmama.[facility]   Childbirth collective: (Parent topic nights)  www.childbirthcollective.org/  Hypnobabies:  www.hypnobabiestPoplar Level Player's Plaza.[facility]/  Hypnobirthing:  Http://hypnobirthing.[facility]/  The Birth Hour: https://[employer]/online-childbirth-class/    APPS and Podcasts:   Ace Baxter Nurture    Evidence Based Birth  The Birth Hour (for birth stories)   Birthful   Expectful   The Longest Shortest Time  PregnancyPodcast Nehal Shankar    Book Recommendations:   Peyton Papito's Birthing From Within--first few chapters include a new-age tone, you may prefer to skip it and keep going, because there is good stuff later.  This book recommendation covers emotional preparation, but does cover coping with pain, and use of both pharmacological and nonpharmacological methods.    Dr. Garza' The Pregnancy Book and The Birth Book--the pregnancy book goes month-by month      The Birth Partner by Maru Kimbrough    Womanly Art of Breastfeeding by La Leche League International   Bestfeeding by Karen Bailey--great pictures    Mothering Your Nursing Toddler, by Giulia Gomez.   Addresses dealing with so many of the challenging behaviors of a nursing toddler.  How Weaning Happens, by La Leche League.  Discusses weaning at all ages, from medically necessary weaning of an infant, all the way up to age 5 (or older), with why/why not, and strategies.  Very empowering book both for deciding to wean and deciding not to.    American College of Nurse-Midwives (ACNM) http://www.midwife.org/; look at the informational handouts at  "http://www.midwife.org/Share-With-Women     www.mymidwife.org    Mother to Baby (Medication and Herbal guidance in pregnancy): http://www.mothertobaby.org  Toll-Free Hotline: 496.105.3473  LactMed (Medication use while breastfeeding): http://toxnet.nlm.nih.gov/newtoxnet/lactmed.htm    Women's Health.gov:  http://www.womenshealth.gov/a-z-topics/index.html    American pregnancy association - http://americanpregnancy.org    Centering Pregnancy (group prenatal care option): http://centeringhealthcare.org    Information about doulas:  Childbirth collective: http://www.childbirthcollective.org/  Doulas of [place] (JONAS):  www.jonas.org  [facility]  project: http://Lily & Strumtiesdoulaproject.com/     Early Childhood and Family Education (ECFE):  ECFE offers parents hands-on learning experiences that will nourish a lifetime of teachable moments.  http://ecfe.info/ecfe-home/    March of Dimes www.marchofAM Pharma.com     FDA - Nutrition  www.mypyramid.gov  Under \"For Consumers,\" click on \"pregnant and breastfeeding women.\"      Centers for Disease Control and Prevention (CDC) - Vaccines : http://www.cdc.gov/vaccines/       When researching information on the web, question the validity of websites.  The domains .gov, .edu and.org tend to be more reliable information.  If there are a lot of advertisements, be cautious of the information provided. Stay away from blogs and chat rooms please!    How can you care for yourself at home?   You can refer to the Starting Out Right book or find it online at http://www.[facility]altheast.org/images/stories/maternity/[facility]-Starting-Out-Right.pdf or http://www.healtheast.org/images/stories/flipbooks/[facility]-starting-out-right/[facility]-starting-out-right.html#p=8     You can sign up for a weekly parenting e-mail that gives support, tips and advice from health care professionals that starts with pregnancy and continues through the toddler years. To register, go to " www.healtheast.org/baby at any time during your pregnancy.      Baby Feeding in the Hospital: Information, Support and Resources    As you prepare for the birth of your child, you will want to consider options for feeding your baby including breast-feeding and/or baby formula. The American Academy of Pediatrics recommends exclusive breast-feeding for the first six months (although any amount of breast-feeding is beneficial).  However, we also understand that breast-feeding is a personal choice and not for everyone. Whether or not you choose to breast-feed, your decision will be respected by our staff.    There are numerous benefits of breast-feeding; here are a few to consider:  Provides antibodies to protect your baby from infections and diseases  The cost: formula can cost over $1,500 per year  Convenience, no warming up or sterilizing bottles and supplies  The physical contact with breastfeeding can make babies feel secure, warm and comforted    What ever my feeding choice, what can I expect after I deliver my baby?  Your baby will usually be placed skin-to-skin immediately following birth. The skin to skin contact between you and your baby will be a special and memorable time. The bonding and attachment comforts your baby and has a positive effect on baby s brain development.   Having your baby  room in  with you also helps you start to learn your baby s body rhythms and sleep cycle.    You will also begin to learn your baby s cues (signals) that he or she is ready to feed.    When do I start to feed my baby?  As soon as possible after your baby s birth, you will be encouraged to begin feeding.  In the first couple of weeks, your baby will eat often.  Breastfeeding babies usually eat at least 8 times in 24 hours.  Babies fed formula usually eat at least 7 times in 24 hours.      Breast-feeding tips:  Get comfortable and use pillows for support.  Have your baby at the level of your breast, facing you,  tummy to  tummy .    Touch your nipple to your baby s lips so you baby s mouth opens wide (rooting reflex).  Aim the nipple toward the roof of your baby s mouth. When your baby opens his or her mouth, pull your baby toward your breast to help your baby  latch on  to your nipple and much of the areola area.  Hand expressing your breast milk can assist with latching your baby to your breast, if needed.  Ask for help, breastfeeding may seem awkward or uncomfortable at first, this is normal. There are numerous resources available at [facility], Clinics and beyond.   If your goal is to exclusively breastfeed, avoid using any formula or artificial nipples (including bottles and pacifiers) while you are your baby are learning to breastfeed unless there is a medical reason.     Mixing breastfeeding and formula can interfere with how you begin building your milk supply.  It can impact how you and your baby  learn  to breastfeeding together and alter the natural growth of  good  bacteria in your baby s stomach.  Delay a pacifier or a bottle in the first few weeks until breastfeeding is well established. This is often around 3 weeks of age.  Ask your nurse to show you how to hand express.   Breast milk can be kept in the refrigerator or freezer for later use.        Touring the Maternity Care Center  At this time we are offering a virtual tour of the Maternity Care Centers at both [facility] and [facility]:   [facility]: https://www.Building Successful Teens.org/Locations/[facility]/Maternity-Care-Center  [place]:   https://Building Successful Teens.org/overarching-care/the-birthplace/tours  https://www.Building Successful Teens.org/Locations/[facility]/Maternity-Care-Center/#virtual_tour  When in person tours become available, registrations is required. To schedule a tour at either [place] or [facility], please do so online using the following links:  [facility]  https://www.Motion DisplaysregistrationMKN Web Solutionser.com/registerlist.asp?s=6&m=303&vs=5&p=2&oleft=375&ps=1&group=37&it=1&mjg=284   [place] - https://www.Motion DisplaysregistrationMKN Web Solutionser.[employer]/registerlist.asp?s=6&m=303&vs=5&p=2&bsehm=696&ps=1&group=38&it=1&viu=664    Hospital and Clinic  Resources:  -Schedule an appointment with a [facility] [place] [facility]wives [facility]   CNM who is also a Lactation Consultant by calling [phone]     -Schedule a clinic appointment with a [facility] [facility] CNM with dedicated clinic hours for breastfeeding assistance by calling [phone]. Breastfeeding clinic visits are at [facility] on Mondays, [facility] on Tuesdays and [facility] on Thursdays.       [place] Baby Café  Due to COVID-19, all Baby Café sessions are canceled until further notice. For lactation support, please contact one of our bilingual staff:  Dayanara (IBCLC) [phone]  Maame (IBCLC/ Setswana) [phone]  Leoncio (Hmong) [phone]  Yfn (Faroese) [phone]  Baby Café is a free, drop-in service offering breastfeeding/chestfeeding support. Come share tips and socialize with other pregnant, breastfeeding/chestfeeding families. Babies and siblings are welcome (no  available).  We offer:  Professionally trained lactation staff.  Resource books for lending.  Relaxed and fun atmosphere.  Refreshments.  Locations  Baby Café is offered at several locations.  Please see below for the Baby Café closest to you.  CANCELED UNTIL FURTHER NOTICE  [facility]  [address] [facility], [facility]  1st Wednesdays of the month   10 a.m. - Noon  CANCELED UNTIL FURTHER NOTICE  [facility] [address], [place], [address]  4th Wednesdays of the month   10 a.m. - 12:30 p.m.     CANCELED UNTIL FURTHER NOTICE  [facility] [address], [place], [address]  4th Wednesdays of the month  4 - 6 p.m.  CANCELED UNTIL FURTHER NOTICE  [employer]  [employer] [facility]  [address], [place], [facility]  2nd Thursdays of the month.  9:30-11:30 a.m.  Enter through the east end of the building, the blue Door C.  Ring the ECFE buzzer to be let in.   More information  Maame Linares  [phone]  [email or web address].     -Attend a baby weigh in at [facility].  Lactation consultants are available to answer questions  [place]: Tuesdays 1:00 - 2:00  [facility]: Mondays 1:00 - 2:00  www.[facility] Hospital[facility] bio.Kustom Codes    -Attend one of the New Mama groups at [facility] in [facility].  [facility] also offers one-on-one in home and in office lactation consults.   www.[facility].Kustom Codes    -Attend a Albino Peña meeting.  Multiple groups in several locations throughout the [facility]. The meetings are no-cost and always informative breastfeeding education session through [employer] La Leche League  Www.josie.org/  Medication use while breastfeeding: http://toxnet.nlm.nih.gov/newtoxnet/lactmed.htm     Online Resources:  [facility]altheast.org/baby sign up for free online weekly e-mail  healtheast.org/maternity  Breastfeedingmadesimple.com  Llli.org (La Leche League)  Normalfed.com  Womenshealth.gov/breastfeeding  Workandpump.com    Breast-feeding Supplies & Pumps:  Talk to your insurance provider or WIC (Women, Infants and Children) to learn more about options available to you. Recent health insurance changes may include additional coverage for supplies and pumps.    Public Health:  Women, Infants and Children Nutrition program (WIC): provides breast-feeding support and education in addition to formal feeding moms. 719-JJF-6024 or http://www.health.[facility].mn.us/divs/fh/wic    Family Health Home Visiting: Public Health Nurse home visits are available. Talk to your provider to see if you qualify. Most counties have a program available.    Additional Resources:  La Leche League is an international, nonprofit, nonsectarian organization offering  "information, education, and support to mothers who want to breast-feed their babies. Local groups offer phone help and monthly meetings. Visit "[employer].org or [employer].org and us the  Find local support  drop down menu or click on the  Resources  tab.    [place] Breastfeeding Resources: 6-366-976-BABY (2229) toll free    National Breastfeeding Help Line trained breastfeeding peer counselors can help answer common breast-feeding questions by phone. Monday-Friday: English/Macedonian  6-054- [phone] toll free, 1-512.206.5017 (TTY)    [facility] Connection: [phone]-[facility] (5452)      Virtual Breastfeeding Support:    During this time of isolation, breastfeeding families need even more community!  Here are some area organizations offering virtual support groups for breastfeeding:    [employer] Support Group, Tuesdays at 10:30 am   Run by ZENOBIA Aviles of [facility]   Go to [facility] Facebook page and click on \"events\" for link   [email or web address]  [facility] Milk Hour, Thursdays at 2:30 pm    Run by ZENOBIA Gorman   Go to [facility] FB page and send message to get link   [email or web address]  Akira Peña [facility] holding virtual meetings the first Tuesday of each month, 8-9 pm, and the   Third Saturday, 10 - 11 am.  Go to [facility]ec[facility] and [place] FB page; message to get link [email or web address]  [facility] offers a Lactation Lounge every Friday 12pm - 1pm, run by Akira Hernandez Leader   Sign up via link at [employer]/cbe-lactation   [email or web address]  [facility] is offering virtual support groups every Monday, 10:30 am - 12 pm, run by nurse" IBCLC   [email or web address]/    Prenatal Breastfeeding Classes:      [employer] is offering virtual breastfeeding and  care classes:  [email or web address]://www.[email or web address]/education-[email or web address]  [employer] childbirth and breastfeeding education offering virtual prenatal breastfeeding classes  [email or web address] [email or web address] Signature/[email or web address]

## 2022-06-21 ENCOUNTER — PATIENT OUTREACH (OUTPATIENT)
Dept: CARE COORDINATION | Facility: CLINIC | Age: 22
End: 2022-06-21
Payer: COMMERCIAL

## 2022-06-27 ENCOUNTER — HOSPITAL ENCOUNTER (OUTPATIENT)
Dept: ULTRASOUND IMAGING | Facility: HOSPITAL | Age: 22
Discharge: HOME | End: 2022-06-27
Attending: ADVANCED PRACTICE MIDWIFE | Admitting: ADVANCED PRACTICE MIDWIFE
Payer: COMMERCIAL

## 2022-06-27 DIAGNOSIS — Z34.00 SUPERVISION OF NORMAL FIRST PREGNANCY, ANTEPARTUM: ICD-10-CM

## 2022-06-27 PROCEDURE — 76805 OB US >/= 14 WKS SNGL FETUS: CPT

## 2022-07-06 ENCOUNTER — PATIENT OUTREACH (OUTPATIENT)
Dept: NURSING | Facility: CLINIC | Age: 22
End: 2022-07-06

## 2022-07-06 DIAGNOSIS — Z34.00 SUPERVISION OF NORMAL FIRST PREGNANCY, ANTEPARTUM: ICD-10-CM

## 2022-07-06 DIAGNOSIS — Z71.89 COUNSELING AND COORDINATION OF CARE: ICD-10-CM

## 2022-07-06 ASSESSMENT — ACTIVITIES OF DAILY LIVING (ADL): DEPENDENT_IADLS:: INDEPENDENT

## 2022-07-06 NOTE — LETTER
[facility]  Patient Centered Plan of Care  About Me:        Patient Name:  Sudarshan Fierro    YOB: 2000  Age:         22 year old   Mykel MRN:    [id number] Telephone Information:  Home Phone [phone]   Mobile [phone]   Mobile [phone]       Address[address]  [place] [address] Email address:  [email or web address]      Emergency Contact(s)    Name Relationship Lgl Grd Work Phone Home Phone Mobile Phone   1. CIARA FIERRO* Sister    [phone]           Primary language:  English     needed? No   [employer]:  [phone] op. 1  Other communication barriers:None    Preferred Method of Communication:  Mail  Current living arrangement: I live in a private home with family    Mobility Status/ Medical Equipment: Independent        Health Maintenance  Health Maintenance Reviewed: Due/Overdue   Health Maintenance Due   Topic Date Due     PREVENTIVE CARE VISIT  Never done     ADVANCE CARE PLANNING  Never done     HEPATITIS B IMMUNIZATION (3 of 3 - 3-dose primary series) 08/25/2015     HPV IMMUNIZATION (3 - 3-dose series) 06/09/2016     DTAP/TDAP/TD IMMUNIZATION (7 - Td or Tdap) 11/22/2021     COVID-19 Vaccine (3 - Booster for Pfizer series) 11/26/2021     MATERNAL SCREENING  Never done     OBGCT (OB)  07/08/2022           My Access Plan  Medical Emergency 911   Primary Clinic Line [phone]   24 Hour Appointment Line [phone] or  [phone]) (toll-free)   24 Hour Nurse Line [phone] (toll-free)   Preferred Urgent Care [facility], [phone]     Preferred Hospital [facility]  [phone]     Preferred Pharmacy [id number] - [place], [place] - [address] AT [facility] [address]     Behavioral Health Crisis Line The National Suicide Prevention Lifeline at 1-847.902.5552 or 911             My Care Team Members  Patient  Care Team       Relationship Specialty Notifications Start End    No Ref-Primary, Physician PCP - General   12/19/18     Fax: [phone]         Poornima Hooker MD MD Family Medicine - Sports Medicine  12/19/18     Phone: [phone] Fax: [phone] [address] [facility] [address]    Re Nayak, CNM Assigned OBGYN Provider   3/13/22     Phone: [phone] Fax: [phone] [address] [facility] [address]    Stephanie Ahuja LSW Lead Care Coordinator Primary Care - CC Admissions 7/6/22     Phone: [phone]         Marilyn Price Community Health Worker  Admissions 7/6/22     Zuleima Monzon Financial Resource Worker Primary Care - CC  7/6/22             My Care Plans  Self Management and Treatment Plan  Goals and (Comments)   Goals        General     Financial Wellbeing (pt-stated)      Notes - Note edited  7/6/2022 12:06 PM by Stephanie Ahuja LSW     Goal Statement: I will apply for [employer] Benefits within the next 1-2 weeks if I am eligible.   Date Goal Set:  July 6, 2022  Strengths: motivated.   Barriers:  trouble with website.   Date to Achieve By: 8-6-2022  Patient expressed understanding of goal: yes  Action steps to achieve this goal:  1. I understand a referral was placed to the Financial Resource Worker, I will receive a call within the next 3 business days.    2. I understand the financial worker will make two attempts to call me. If I still need help with this goal, I will connect with my Community Health Worker Marilyn at [phone].                    Advance Care Plans/Directives Type:   No data recorded    My Medical and Care Information  Problem List   Patient Active Problem List   Diagnosis     Supervision of normal first pregnancy, antepartum     BMI 31.0-31.9,adult     Hypovitaminosis D     Family history of diabetes mellitus in first degree relative      ancestry requiring population-specific genetic screening     At risk for  complication of pregnancy     Cervical cancer screening     Papanicolaou smear of cervix with low grade squamous intraepithelial lesion (LGSIL)     Genetic screening     Counseling and coordination of care      Current Medications and Allergies:    Current Outpatient Medications   Medication Instructions     aspirin (ASA) 81 mg, Oral, DAILY, Start at 12 weeks of pregnancy. Take 1 tab daily.     ferrous sulfate (SLO-FE) 142 mg, Oral, EVERY OTHER DAY     Prenatal Vit-Fe Fumarate-FA (PRENATAL MULTIVITAMIN W/IRON) 27-0.8 MG tablet 1 tablet, Oral, DAILY         Care Coordination Start Date: 7/6/2022   Frequency of Care Coordination: monthly     Form Last Updated: 07/06/2022

## 2022-07-06 NOTE — LETTER
[facility]  Patient Centered Plan of Care  About Me:        Patient Name:  Sudarshan Fierro    YOB: 2000  Age:         22 year old   Mykel MRN:    [id number] Telephone Information:  Home Phone [phone]   Mobile [phone]   Mobile [phone]       Address[address]  [place] [address] Email address:  [email or web address]      Emergency Contact(s)    Name Relationship Lgl Grd Work Phone Home Phone Mobile Phone   1. CIARA FIERRO* Sister    [phone]           Primary language:  English     needed? No   [employer]:  [phone] op. 1  Other communication barriers:None    Preferred Method of Communication:  Mail  Current living arrangement: I live in a private home with family    Mobility Status/ Medical Equipment: Independent        Health Maintenance  Health Maintenance Reviewed: Due/Overdue   Health Maintenance Due   Topic Date Due     PREVENTIVE CARE VISIT  Never done     ADVANCE CARE PLANNING  Never done     HEPATITIS B IMMUNIZATION (3 of 3 - 3-dose primary series) 08/25/2015     HPV IMMUNIZATION (3 - 3-dose series) 06/09/2016     DTAP/TDAP/TD IMMUNIZATION (7 - Td or Tdap) 11/22/2021     COVID-19 Vaccine (3 - Booster for Pfizer series) 11/26/2021     MATERNAL SCREENING  Never done     OBGCT (OB)  07/08/2022           My Access Plan  Medical Emergency 911   Primary Clinic Line [phone]   24 Hour Appointment Line [phone] or  [phone] (toll-free)   24 Hour Nurse Line [phone] (toll-free)   Preferred Urgent Care [facility], [phone]     Preferred Hospital [facility]  [phone]     Preferred Pharmacy [id number] - [place], [place] - [address] AT [facility] OF [address]     Behavioral Health Crisis Line The National Suicide Prevention Lifeline at 1-711.816.6525 or 911             My Care Team Members  Patient  Care Team       Relationship Specialty Notifications Start End    No Ref-Primary, Physician PCP - General   12/19/18     Fax: [phone]         Poornima Hooker MD MD Family Medicine - Sports Medicine  12/19/18     Phone: [phone] Fax: [phone] [address] [facility] [address]    Re Nayak, CNM Assigned OBGYN Provider   3/13/22     Phone: [phone] Fax: [phone] [address] [facility] [address]    Stephanie Ahuja LSW Lead Care Coordinator Primary Care - CC Admissions 7/6/22     Phone: [phone]         Marilyn Price Community Health Worker  Admissions 7/6/22     Zuleima Monzon Financial Resource Worker Primary Care - CC  7/6/22             My Care Plans  Self Management and Treatment Plan  Goals and (Comments)   Goals        General     Financial Wellbeing (pt-stated)      Notes - Note edited  7/6/2022 12:06 PM by Stephanie Ahuja LSW     Goal Statement: I will apply for [employer] Benefits within the next 1-2 weeks if I am eligible.   Date Goal Set:  July 6, 2022  Strengths: motivated.   Barriers:  trouble with website.   Date to Achieve By: 8-6-2022  Patient expressed understanding of goal: yes  Action steps to achieve this goal:  1. I understand a referral was placed to the Financial Resource Worker, I will receive a call within the next 3 business days.    2. I understand the financial worker will make two attempts to call me. If I still need help with this goal, I will connect with my Community Health Worker Marilyn at [phone].                    Advance Care Plans/Directives Type:   No data recorded    My Medical and Care Information  Problem List   Patient Active Problem List   Diagnosis     Supervision of normal first pregnancy, antepartum     BMI 31.0-31.9,adult     Hypovitaminosis D     Family history of diabetes mellitus in first degree relative      ancestry requiring population-specific genetic screening     At risk for  complication of pregnancy     Cervical cancer screening     Papanicolaou smear of cervix with low grade squamous intraepithelial lesion (LGSIL)     Genetic screening     Counseling and coordination of care      Current Medications and Allergies:    Current Outpatient Medications   Medication Instructions     aspirin (ASA) 81 mg, Oral, DAILY, Start at 12 weeks of pregnancy. Take 1 tab daily.     ferrous sulfate (SLO-FE) 142 mg, Oral, EVERY OTHER DAY     Prenatal Vit-Fe Fumarate-FA (PRENATAL MULTIVITAMIN W/IRON) 27-0.8 MG tablet 1 tablet, Oral, DAILY         Care Coordination Start Date: 7/6/2022   Frequency of Care Coordination: monthly     Form Last Updated: 07/06/2022

## 2022-07-06 NOTE — PROGRESS NOTES
Clinic Care Coordination Contact    Clinic Care Coordination Contact  OUTREACH    Referral Information:  Referral Source: PCP    Primary Diagnosis: Pregnancy    Chief Complaint   Patient presents with     Clinic Care Coordination - Initial        Universal Utilization: appropriate  Clinic Utilization  Difficulty keeping appointments:: No  Compliance Concerns: No  No-Show Concerns: No  No PCP office visit in Past Year: No  Utilization    Hospital Admissions  0             ED Visits  0             No Show Count (past year)  0                Current as of: 2022 10:02 AM              Clinical Concerns:  Current Medical Concerns:  22 yr old, pregnant with first child.  Due 10-28.  Getting Diabetes testing done.  Has back pain.     Current Behavioral Concerns: mom  two years ago and misses her.Tired and overwhelmed about her situation.  Has some family support and help from FOB.    Education Provided to patient: Reviewed role of care coordination, and resources.    Pain  Pain (GOAL):: Yes  Type: Chronic (>3mo)  Location of chronic pain:: back  Radiating: No  Progression: Constant  Description of pain: Aching  Chronic pain severity:: 7  Limitation of routine activities due to chronic pain:: No  Alleviating Factors: Other  Aggravating Factors: Other  Health Maintenance Reviewed: Due/Overdue   Health Maintenance Due   Topic Date Due     PREVENTIVE CARE VISIT  Never done     ADVANCE CARE PLANNING  Never done     HEPATITIS B IMMUNIZATION (3 of 3 - 3-dose primary series) 2015     HPV IMMUNIZATION (3 - 3-dose series) 2016     DTAP/TDAP/TD IMMUNIZATION (7 - Td or Tdap) 2021     COVID-19 Vaccine (3 - Booster for Pfizer series) 2021     MATERNAL SCREENING  Never done     OBGCT (OB)  2022       Clinical Pathway: None    Medication Management:  Medication review status: Medications reviewed and no changes reported per patient.        No concerns.      Functional Status:  Dependent ADLs::  Independent, Ambulation-no assistive device  Dependent IADLs:: Independent  Bed or wheelchair confined:: No  Mobility Status: Independent  Fallen 2 or more times in the past year?: No  Any fall with injury in the past year?: No    Living Situation:  Current living arrangement:: I live in a private home with family  Type of residence:: Apartment  Plans to find a new place to live around the time she has the baby with her boyfriend.  Currently lives with sister and they share expenses and patient pays rent and for food.  She works at [employer] and her pay varies with hours worked, but is around $900 every two weeks.  Expects to have some paid time off and has gotten paper work for [employer].  Would like to be off work for 3-6 months.  Has not looked into day care at this time. She has health insurance through work place, but not a very good plan and lots of out of pocket expenses.  She is paying for a car, insurance, rent, food, etc so money is tight.      She quit smoking when she learned she was pregnant and used to smoke to help with anxiety. Is trying to learn other coping options like gardening.    Lifestyle & Psychosocial Needs:    Social Determinants of Health     Tobacco Use: Medium Risk     Smoking Tobacco Use: Former Smoker     Smokeless Tobacco Use: Never Used   Alcohol Use: Not At Risk     Frequency of Alcohol Consumption: Monthly or less     Average Number of Drinks: 1 or 2     Frequency of Binge Drinking: Never   Financial Resource Strain: High Risk     Difficulty of Paying Living Expenses: Hard   Food Insecurity: Food Insecurity Present     Worried About Running Out of Food in the Last Year: Sometimes true     Ran Out of Food in the Last Year: Sometimes true   Transportation Needs: No Transportation Needs     Lack of Transportation (Medical): No     Lack of Transportation (Non-Medical): No   Physical Activity: Sufficiently Active     Days of Exercise per Week: 5 days     Minutes of Exercise per Session: 30  min   Stress: Not on file   Social Connections: Moderately Isolated     Frequency of Communication with Friends and Family: More than three times a week     Frequency of Social Gatherings with Friends and Family: Once a week     Attends Amish Services: 1 to 4 times per year     Active Member of Clubs or Organizations: No     Attends Club or Organization Meetings: Not on file     Marital Status: Never    Intimate Partner Violence: Not on file   Depression: Not at risk     PHQ-2 Score: 0   Housing Stability: Low Risk      Unable to Pay for Housing in the Last Year: No     Number of Places Lived in the Last Year: 1     Unstable Housing in the Last Year: No     Diet:: Other  Inadequate nutrition (GOAL):: No  Tube Feeding: No  Inadequate activity/exercise (GOAL):: No  Significant changes in sleep pattern (GOAL): No  Transportation means:: Regular car     Amish or spiritual beliefs that impact treatment:: No  Mental health DX:: No  Mental health management concern (GOAL):: No  Chemical Dependency Status: No Current Concerns  Informal Support system:: Family, Friends, Significant other      Resources and Interventions:  Current Resources:      Community Resources: None  Supplies Currently Used at Home: None  Equipment Currently Used at Home: none  Employment Status: employed full-time         Advance Care Plan/Directive  Advanced Care Plans/Directives on file:: No  Advanced Care Plan/Directive Status: Declined Further Information    Referrals Placed: [facility], [facility]  Gave phone number for [facility] [place] [facility] and she will call them to sign up if eligible.  She would like assistance with applying for MA and SNAP and referral to [facility] was made.  Denied needing any mental health support or [facility] family visiting program at this time.      Goals:    Goals        General     Financial Wellbeing (pt-stated)      Notes - Note edited  7/6/2022 12:06 PM by Stephanie Ahuja LSW     Goal Statement: I will  apply for [facility] Benefits within the next 1-2 weeks if I am eligible.   Date Goal Set:  July 6, 2022  Strengths: motivated.   Barriers:  trouble with website.   Date to Achieve By: 8-6-2022  Patient expressed understanding of goal: yes  Action steps to achieve this goal:  1. I understand a referral was placed to the Financial Resource Worker, I will receive a call within the next 3 business days.    2. I understand the financial worker will make two attempts to call me. If I still need help with this goal, I will connect with my Community Health Worker Marilyn at [phone].                 Patient/Caregiver understanding: Enrolled in care coordination and will expect a call from FRW.      Outreach Frequency: monthly  Future Appointments              In 2 weeks Re Nayak, [facility]          Plan: CCC SW will continue to monitor, support patient with current goals and will be available to assist as needs arise. CCC CHW will reach out to patient on a monthly basis to discuss progression of goals.      CCC SW will perform Chart Review in 45 days.      and Financial Resource Worker Screening    [facility] Benefits  Is patient requesting help applying for [facility] benefits?: Yes  Have you recently applied for any [facility] benefits?: No  Do you buy/eat food together?: Yes  What is the monthly gross income for the household (wages, social security, workers comp, and pension)? : 1800    Insurance:  [place] insurance?: No  Is this an insurance renewal?: No  Is this a new insurance application request?: Yes  Have you recently applied for insurance?: No  Do you file taxes?: Yes  How many dependents do you claim?: 0    Any other information for the [facility]?: pregnant, has work insurance now, but is cruddy.  Lives with sister and other family, she pays rents and buys food.  Please help with MA alvin and with Snap if she looks eligible.  Thanks.    Care Coordination  team will tell patient:   Thank you for answering all the questions, based on screening questions, our Financial Resource Worker will reach out to you with additional questions and next steps.    Stephanie Ahuja,   [facility]  [phone]

## 2022-07-06 NOTE — LETTER
[facility] CARE COORDINATION  [facility]    July 6, 2022    Sudarshan Fierro  [address]  [place] [address]      Dear Sudarshan,    I am a clinic care coordinator who works with Physician Anastacia Ref-Primary with the [facility]. I wanted to thank you for spending the time to talk with me.  Below is a description of clinic care coordination and how I can further assist you.       The clinic care coordination team is made up of a registered nurse, , financial resource worker and community health worker who understand the health care system. The goal of clinic care coordination is to help you manage your health and improve access to the health care system. Our team works alongside your provider to assist you in determining your health and social needs. We can help you obtain health care and community resources, providing you with necessary information and education. We can work with you through any barriers and develop a care plan that helps coordinate and strengthen the communication between you and your care team.    Please feel free to contact me with any questions or concerns regarding care coordination and what we can offer.      We are focused on providing you with the highest-quality healthcare experience possible.    Sincerely,     Stephanie Auhja,   [facility]  [phone]

## 2022-07-07 ENCOUNTER — PATIENT OUTREACH (OUTPATIENT)
Dept: CARE COORDINATION | Facility: CLINIC | Age: 22
End: 2022-07-07

## 2022-07-07 NOTE — PROGRESS NOTES
Clinic Care Coordination Contact    Situation: Patient chart reviewed by care coordinator.    Background: Care Coordination initial assessment and enrollment to Care Coordination was on 7/6/22. Patient centered goals were developed with participation from patient. CCC SW handed patient off to CHW for continued outreach every 30 days.      Assessment: Per chart review, patient outreach completed by CCC SW on 7/6/22.      Plan/Recommendations: CCC SW will continue to monitor, support patient with current goals and will be available to assist as needs arise. CHW will outreach in one month on 8/8/22.      Marilyn Price  Community Health Worker   [facility] Care Coordination  [facility]   [email or web address]  Office: [phone]

## 2022-07-07 NOTE — PROGRESS NOTES
Clinic Care Coordination Contact  Program: Health Insurance   County: [facility] Case #:  [facility] Worker:   Jessica #:   Subscriber #:   Renewal:  Date Applied:     FRW Outreach:  2022: FRW called patient and we went through the combined alvin screening. Patient is over $1,000 over income for SNAP, however, she appears to qualify for MA based off her pregnancy. We scheduled a phone appointment for 22 at 12:00 pm. FRW will make outreach at that time.     SNAP/CASH Application Screenin. Have you had [facility] benefits before? No  2. How many people in the household, do you eat/buy food together? 1  3. What is your monthly income (include all tax members)? $17.54, 37 (just changed to $18.25) =$2,812.24  4. Do you have a bank account?   5. Do you have any utility bills (electricity, rent, mortgage, phone, insurance, medical bills, etc.)? Rent $300  6. Do you have social security cards and/or green cards?       Health Insurance:    [facility]    Referral/Screening:    [facility] Benefits  Is patient requesting help applying for [facility] benefits?: Yes  Have you recently applied for any [facility] benefits?: No  Do you buy/eat food together?: Yes  What is the monthly gross income for the household (wages, social security, workers comp, and pension)? : 1800     [place]:  [place] No  Is this an insurance renewal?: No  Is this a new insurance application request?: Yes  Have you recently applied for insurance?: No  Do you file taxes?: Yes  How many dependents do you claim?: 0     Any other information for the FRW?: pregnant, has work insurance now, but is cruddy.  Lives with sister and other family, she pays rents and buys food.  Please help with MA alvin and with Snap if she looks eligible.  Thanks.     Care Coordination team will tell patient:   Thank you for answering all the questions, based on screening questions, our Financial Resource Worker will reach out to you with additional questions  and next steps.     Stephanie Ahuja,   [facility]  [phone]    Financial Resource Worker Follow Up    Goals:    Goals Addressed as of 7/7/2022 at 11:14 AM                    Today       Financial Wellbeing (pt-stated)   20%    Added 7/6/22 by Stephanie Ahuja LSW      Goal Statement: I will apply for Health Insurance within the next 1-2 weeks if I am eligible.   Date Goal Set:  July 6, 2022  Strengths: motivated.   Barriers:  trouble with website.   Date to Achieve By: 8-6-2022  Patient expressed understanding of goal: yes  Action steps to achieve this goal:  1. I understand a referral was placed to the Financial Resource Worker, I will receive a call within the next 3 business days.    2. I understand the financial worker will make two attempts to call me. If I still need help with this goal, I will connect with my Community Health Worker Marilyn at [phone].                 Intervention and Education during outreach: n/a    FRW Plan: FRW will make outreach 7/11/22 at 12:00 pm

## 2022-07-11 ENCOUNTER — PATIENT OUTREACH (OUTPATIENT)
Dept: CARE COORDINATION | Facility: CLINIC | Age: 22
End: 2022-07-11

## 2022-07-11 NOTE — PROGRESS NOTES
Clinic Care Coordination Contact  Program: Health Insurance   County: [facility] Case #:  [facility] Worker:   Jessica #:   Subscriber #:   Renewal:  Date Applied: 2022    FRW Outreach:  2022: FRW called patient x3 for our scheduled phone appointment. The line rang and then went silent, patient answered on the 3rd try. We tried to set up an account and it states one already exists. We called [employer] and spoke to Jayla. He provided the username and re-set the password. We completed the online [place] application together over the phone. Patient appears to qualify for MA. FRW will check coverage and follow up in 2 weeks.     Medical Assistance hide details  Sudarshan appears to qualify for Medical Assistance. This is an initial eligibility result based on the information you entered on your application. You will get a health care notice showing your final eligibility results. We may need proof to verify your answers to some of the application questions before your coverage can begin. The notice will tell you if you need to send in proof. .    2022: FRW called patient and we went through the combined alvin screening. Patient is over $1,000 over income for SNAP, however, she appears to qualify for MA based off her pregnancy. We scheduled a phone appointment for 22 at 12:00 pm. FRW will make outreach at that time.     SNAP/CASH Application Screenin. Have you had [facility] benefits before? No  2. How many people in the household, do you eat/buy food together? 1  3. What is your monthly income (include all tax members)? $17.54, 37 (just changed to $18.25) =$2,812.24 Roland until wage increase   4. Do you have a bank account?   5. Do you have any utility bills (electricity, rent, mortgage, phone, insurance, medical bills, etc.)? Rent $300  6. Do you have social security cards and/or green cards?       Health Insurance:    [facility]    Referral/Screening:    [facility] Benefits  Is patient requesting help applying for  county benefits?: Yes  Have you recently applied for any [facility] benefits?: No  Do you buy/eat food together?: Yes  What is the monthly gross income for the household (wages, social security, workers comp, and pension)? : 1800     [place]:  [place] No  Is this an insurance renewal?: No  Is this a new insurance application request?: Yes  Have you recently applied for insurance?: No  Do you file taxes?: Yes  How many dependents do you claim?: 0     Any other information for the FRW?: pregnant, has work insurance now, but is cruddy.  Lives with sister and other family, she pays rents and buys food.  Please help with MA alvin and with Snap if she looks eligible.  Thanks.     Care Coordination team will tell patient:   Thank you for answering all the questions, based on screening questions, our Financial Resource Worker will reach out to you with additional questions and next steps.     Stephanie Ahuja,   [facility]  [phone]    Financial Resource Worker Follow Up    Goals:    Goals Addressed as of 7/11/2022 at 12:44 PM                    Today    7/7/22       Financial Wellbeing (pt-stated)   50%  20%    Added 7/6/22 by Stephanie Ahuja, [facility]      Goal Statement: I will apply for Health Insurance within the next 1-2 weeks if I am eligible.   Date Goal Set:  July 6, 2022  Strengths: motivated.   Barriers:  trouble with website.   Date to Achieve By: 8-6-2022  Patient expressed understanding of goal: yes  Action steps to achieve this goal:  1. I understand a referral was placed to the Financial Resource Worker, I will receive a call within the next 3 business days.    2. I understand the financial worker will make two attempts to call me. If I still need help with this goal, I will connect with my Community Health Worker Marilyn at [phone].                 Intervention and Education during outreach: n/a    FRW Plan: FRW will follow up in 2 weeks

## 2022-07-16 ENCOUNTER — HEALTH MAINTENANCE LETTER (OUTPATIENT)
Age: 22
End: 2022-07-16

## 2022-07-25 ENCOUNTER — PRENATAL OFFICE VISIT (OUTPATIENT)
Dept: MIDWIFE SERVICES | Facility: CLINIC | Age: 22
End: 2022-07-25
Payer: COMMERCIAL

## 2022-07-25 ENCOUNTER — PATIENT OUTREACH (OUTPATIENT)
Dept: CARE COORDINATION | Facility: CLINIC | Age: 22
End: 2022-07-25

## 2022-07-25 ENCOUNTER — MYC MEDICAL ADVICE (OUTPATIENT)
Dept: ORTHOPEDICS | Facility: CLINIC | Age: 22
End: 2022-07-25

## 2022-07-25 VITALS
HEART RATE: 72 BPM | BODY MASS INDEX: 37.08 KG/M2 | DIASTOLIC BLOOD PRESSURE: 74 MMHG | WEIGHT: 211 LBS | SYSTOLIC BLOOD PRESSURE: 118 MMHG

## 2022-07-25 DIAGNOSIS — Z34.00 SUPERVISION OF NORMAL FIRST PREGNANCY, ANTEPARTUM: Primary | ICD-10-CM

## 2022-07-25 DIAGNOSIS — O26.00 EXCESSIVE WEIGHT GAIN AFFECTING PREGNANCY: ICD-10-CM

## 2022-07-25 LAB
GLUCOSE 1H P 50 G GLC PO SERPL-MCNC: 82 MG/DL (ref 70–129)
HGB BLD-MCNC: 12.2 G/DL (ref 11.7–15.7)
T PALLIDUM AB SER QL: NONREACTIVE

## 2022-07-25 PROCEDURE — 36415 COLL VENOUS BLD VENIPUNCTURE: CPT | Performed by: ADVANCED PRACTICE MIDWIFE

## 2022-07-25 PROCEDURE — 99207 PR PRENATAL VISIT: CPT | Performed by: ADVANCED PRACTICE MIDWIFE

## 2022-07-25 PROCEDURE — 85018 HEMOGLOBIN: CPT | Performed by: ADVANCED PRACTICE MIDWIFE

## 2022-07-25 PROCEDURE — 82950 GLUCOSE TEST: CPT | Performed by: ADVANCED PRACTICE MIDWIFE

## 2022-07-25 PROCEDURE — 86780 TREPONEMA PALLIDUM: CPT | Performed by: ADVANCED PRACTICE MIDWIFE

## 2022-07-25 NOTE — PATIENT INSTRUCTIONS
"[facility]  - Contact information:  Appointment line and to get a hold of CNM in clinic Monday-Friday 8 am - 5 pm:  [phone].  There are some clinics with early start times (1st appointment 7:40 am) and others with evening hours (last appointment 6:20 pm).  Most are typically open from 8 am to 5 pm.    CNM on call answering service: [phone].  Specify your hospital of choice and leave a brief message for CNM;  will then page CNM who is on call at your specified hospital and you should receive a call back with 15 minutes.  Be sure that your ringer is audible and that you can accept blocked calls so that we can get back in touch with you! This number should be reserved for urgent needs if during the day, before 8 am, after 5 pm, weekends, holidays.    Contact the on-call CNM with warning signs, such as:  vaginal bleeding   Vaginal discharge and itching or pain and burning during urination  Leg/calf pain or swelling on one side  severe abdominal pain  nausea and vomiting more than 4-5 times a day, or if you are unable to keep anything down  fever more than 100.4 degrees F.     [employer]  After each of your visits you are welcome to check [employer] for your visit summary including education and links to information relevant to your pregnancy and/or well woman care.   Find the \"Visits\" tab at the top of the page, you will see a list of recent visits and for each visit a for link for \"View After Visit Summary.\" View of your After Visit Summary will allow you to read our recommendations from your visit, review any education provided, and link to websites with useful information.   If you have any questions or difficulty navigating [employer], please feel free to contact us and we will do our best to direct you.  Meet the Midwives from [facility]  You are invited to an informational meet and greet with [facility] Certified Nurse-Midwives. Our " "free \"Meet the Midwives\" event is a great opportunity to learn about our midwives' philosophy and experience, the hospitals where we can assist with your birth, and answer questions you may have. Partners, friends, and family are welcome to attend. Currently, this is a virtual event.  Date  First Tuesday of every month at 7 pm.    Link to next (live) meeting  [email or web address]://www.[employer].org/classes-and-events/meet-the-midwives-from-[facility]  To Join by Telephone (audio only) Call:   [phone] Phone Conference ID: [phone]#      Touring the Maternity Care Center  At this time we are offering a virtual tour of the Maternity Care Centers at both [facility] and [facility]:   [facility] Maternity Care:   [email or web address]://[email or web address] MediaRespectance.[email or web address]/locations/the-birthplace-[facility]:   https://Weimi.POPSUGAR/locations/[facility]place-[facility]      Breastfeeding and Birth Control  How do I decide what birth control method is best for me while I am breastfeeding?  Choosing a method of birth control is very personal. First, answer the following questions:     Do you want to have more children?   How much spacing between births do you want for your children?   Do you smoke or have you had any health problems, such as liver disease or a blood clot?  Talk about the answers to each of these questions with your health care provider to help you choose the best method for you.    Can I use breastfeeding as my birth control?  Using breastfeeding as your birth control (the lactational amenorrhea method) can be a good way to keep from getting pregnant in the first months after the baby is born. Each time your baby nurses, your body releases a hormone called prolactin, which stops your body from making the hormones that cause you to ovulate (release an egg). If you are not ovulating, you cannot get pregnant.    The " lactational amenorrhea method works only if:   you have not started your period yet.   you are breastfeeding only and not giving your baby any other food or drink.   you are breastfeeding at least every 4 hours during the day and every 6 hours at night.   your baby is less than 6 months old.  When any 1 of these 4 things is not happening, you no longer have good protection from getting pregnant, and you should use another form of birth control.    What birth control methods are safe for me to use while I breastfeed?    Methods without hormones  Methods without hormones do not affect you, your baby, or your breastfeeding.  Methods without hormones that are the most effective   The copper intrauterine contraceptive device (IUD) (ParaGard) is a small, T-shaped device that is in- serted into your uterus (womb) through the vagina and cervix. The copper IUD lasts for 10 years.   Sterilization (getting your tubes tied or your partner having a vasectomy) is very effective, but it is per- manent. You should choose sterilization only if you do not want to have more children.  A method without hormones that is effective   The lactational amenorrhea method described above is effective for the first 6 months.  Methods without hormones that are less effective   Natural family planning is monitoring your body for signs of ovulation and not having sex when you think you are ovulating. This method is reliable only if you are having regular periods every month.   Barrier methods (condoms, diaphragms, sponges, and spermicides) are used at the time you have sex. These methods are effective only if you use them correctly every time.    Methods with hormones  Birth control methods that use hormones can be used while you are breastfeeding. They may have a small effect on lowering the amount of milk you make. All hormones will get into your breast milk in very small amounts, but there is no known harm to your baby from this small amount of  hormone in breast milk.only methodsmethods use only 1 hormone, called progestin. You can start them right after your baby is born or wait 4 to 6 weeks to make sure your milk supply is good.   Progestin-only pills ( minipills ): If you like to take pills every day, you can use the minipill. In order forpill to work well, you have to take 1 at the same time each day. When you stop breastfeeding, you should start pills that have both estrogen and progestin because they are better at keeping you from get- ting pregnant.   Progestin IUD (Mirena): The progestin IUD is shaped and inserted into the uterus like the copper IUD. It works for up to 5 years. Both IUDs are usually inserted 4 to 6 weeks after the baby is born.  Progestin implant (Nexplanon): The progestin implant is a small matchstick-sized flexible hotrensia. It is placed into the fatty tissue in the back of your arm. It works for up to 3 years.  Progestin shot (Depo-Provera): The progestin shot is given every 3 months.    estrogen and progestin methods  These methods use 2 hormones, called estrogen and progestin.     These methods increase your risk of a blood clot, which is already higher than normal after you have a baby. You should not use them until your baby is at least 6 weeks old. The combined methods are not recommended as the first choice for women who are breastfeeding. If a combined method is the one that you feel will be best for you to prevent getting pregnant, these methods are okay to use while breastfeeding.   Combined birth control pills: You take a pill each day.  Vaginal ring (NuvaRing): The ring is worn in the vagina for 3 weeks then left out for 1 week before youin a new ring.  Patch (Ortho Evra): The patch is placed on your skin and changed every week for 3 weeks then left off forweek before putting a new patch on a different area of your skin.    Pediatric Care Providers at [facility]:    Choosing the right  provider is one of the most important decisions you ll make about your health care. We can help you find the right one. Remember, you re looking for a provider you can trust and work with to improve your health and well-being, so take time to think about what you need. Depending on how complicated your health care needs are, you may need to see more than one type of provider.    Primary Care Providers: You ll see a primary care provider first for most health issues. They ll work with you to get your recommended screenings, help you manage chronic conditions, and refer you to other types of providers if you need them. Your primary care provider may be called a family physician or doctor, internist, general practitioner, nurse practitioner, or physician s assistant. Your child or teenager s provider may be called a pediatrician.  Specialists: You ll see a specialist for certain services or to treat specific conditions. Specialists include cardiologists, oncologists, psychologists, allergists, podiatrists, and orthopedists. You may need a referral from your primary care provider before you go to a specialist in order for your health plan to pay for your visit.\Joshuaere are some tips for finding a provider where you live:  If you already have a provider you like and want to keep working with, call their office and ask if they accept your coverage.  Call your insurance company or state Medicaid and CHIP program. Look at their website or check your member handbook to find providers in your network who take your health coverage.  Ask your friends or family if they have providers they like and use these tools to compare health care providers in your area.    Family Medicine at  [facility]:     https://www.[place].org/specialties/family-medicine    Many of our families enjoy all seeing the same doctor, who comes to know the whole family very well. We base our practice on the knowledge of the  patient in the context of family and community.  WHY CHOOSE A FAMILY MEDICINE PHYSICIAN?  Ability of the whole family to see the same doctor  Focus on the whole person, including physical and emotional health  A personal relationship with their doctor that is nurtured over time  Respect for individual and family beliefs and values  No need to change primary care providers when a certain age is reached  Coordination of care when other health care services are needed    Pediatrics at  [facility]:   https://www.[email or web address].[email or web address]/specialties/pediatric-care    Through a teaching affiliation with the [facility], [facility] staff keeps current on new developments in the field of pediatrics.     Everything we do centers around caring for children. We place special emphasis on wellness and prevention.pediatric care team includes a team of pediatricians and certified nurse practitioners who provide care to pediatric and adolescent patients ages 0 to 18, and some up to the age of 26. We offer preventive health maintenance for healthy children as well the diagnosis and treatment of common and chronic illnesses and injuries. In addition, we also offer several pediatric specialists who focus on adolescent health issues and developmental and behavioral issues.    Circumcision    Educational video:     [email or web address]    What is circumcision?  At birth, baby boys have loose skin that covers the head of the penis. This skin is called the foreskin. When all or part of the foreskin of the penis is cut off, this is called circumcision.  Why is circumcision done?  Circumcision is done for many reasons including Moravian, cultural, looks, and health. Some Moravian groups circumcise all boys as a galina-based practice. Many people in the [place] choose to circumcise their baby boys because they believe it is culturally normal.  It is not a common practice in [place], [place], or [place].  Some parents choose circumcision so that their son will have a penis that looks like his father s if the father was also circumcised. Other people choose circumcision because they believe it is  or will protect the boy or man from infection or cancer later in life.  Does circumcision protect against infection or cancer?  Circumcision does seem to protect against some types of infection or cancer. Cancer of the penis is one type of cancer that circumcision may prevent. However, cancer of the penis is very rare. One hundred thousand circumcisions would need to be done to prevent one case of cancer of the penis. Circumcision may also decrease the chance of some sexually transmitted infections, such as HIV and human papilloma virus (HPV). See the next page for more information on the risks and benefits of circumcision.  What happens during a circumcision?  Babies born in the hospital are usually circumcised before they go home. Health care providers also perform circumcisions in their offices and clinics within a few weeks after birth.  Yazidi circumcisions are most often done at home or in a Sikh.  Before the circumcision is performed, some providers give an injection (shot) of a small amount of anesthetic (numbing medicine) at the base of the penis to block the pain or put an anesthetic cream on the penis to numb the area that will be cut.  There are 2 different ways to do a circumcision. In one type, a clamp placed around the head of the penis cuts off the blood supply to the foreskin, and the foreskin above the clamp is cut off. The clamp is left on the penis until the area heals and it falls off a few days later. In another type of circumcision, the foreskin is cut off with scissors or a scalpel.  After the circumcision, petroleum jelly and sometimes gauze may be put over the area of the penis where the skin was removed. This protects  the end of the penis while it heals.  Can I keep my son s penis  if it is circumcised?  Regular washing with soap and water will keep any penis clean. Circumcision does not make the penis . Uncircumcised boys do need to be taught to clean beneath their foreskin, just like they need to be taught to wash their hands or brush their teeth.  How do I decide if I should have my son circumcised?  The American Academy of Pediatrics (AAP) says that  circumcision may have health benefits. They do not recommend circumcision for all boys as a routine procedure. The AAP recommends that you talk to your health care provider to decide if circumcision is the right choice for your family. You may also wish to discuss the question with your family or .  What are the risks and benefits of circumcision?  We do not have a lot of good scientific information about the health risks or benefits of circumcision.  Possible Risks:  Very few baby boys (less than 1 in 100) will have a problem after circumcision, such as bleeding or mild infection of the penis. These problems are usually not serious and are easy to treat.  Less common problems are:   Removal of too much or too little foreskin  Some rare problems are:   Narrowing of the opening of the penis, which can cause problems with urination   Removal of part of the penis or death of some of the other skin on the penis  Infection that spreads to other parts of the body  People used to think babies did not really feel pain. Now we know that they do. Many baby boys appear to feel a lot of pain during circumcision if anesthesia is not used.  We do not know if circumcision affects sexual function or sensation.  Possible Benefits:   Less risk for some kinds of cancers, like cancer of the penis   Fewer urinary tract (bladder or kidney) infections for babies   Less risk for some sexually transmitted infections, such as HIV, herpes, and HPV    May protect female  sexual partners from some sexually transmitted infections    For More Information  American Academy of Family Physicians: Circumcision  http://familydoctor.org/familydoctor/en/pregnancy-newborns/caring-for-newborns/infant- care/circumcision.html  MedlinePlus: Circumcision (includes a slide show on the procedure)  www.nlm.nih.gov/medlineplus/circumcision.html  American Academy of Pediatrics: Policy statement on circumcision  Http://pediatrics.aappublications.org/content/130/3/e756.abstract      Childbirth and Parenting Education:         Everyday Miracles:   https://www.everyday-miracles.org/    Free Video Series from [facility]: https://nursing.[facility]/academics/specialty-areas/nurse-midwifery/having-baby-prenatal-videos/having-baby-prenatal-and    LURDES parenting [place]: http://Select Specialty HospitalPURE H20 [employer]/   ([phone]) 653-HHGJ  [place]: (education, yoga & wellness) www.iCouch  Enlightened Mama: www.enlightenedmama.[employer]   Childbirth collective: (Parent topic nights)  www.childbirthcollective.org/  Hypnobabies:  www.hypnobabiestSynesiscities.com/  Hypnobirthing:  Http://hypnobirthing.[employer]/  The Birth Hour: https://Intertainment Media/online-childbirth-class/    APPS and Podcasts:   Ace Baxter Nurture    Evidence Based Birth  The Birth Hour (for birth stories)   Birthful   Expectful   The Longest Shortest Time  PregnancyPodcast Nehal Shankar    Book Recommendations:   Peyton [place]'s Birthing From Within--first few chapters include a new-age tone, you may prefer to skip it and keep going, because there is good stuff later.  This book recommendation covers emotional preparation, but does cover coping with pain, and use of both pharmacological and nonpharmacological methods.    Dr. Garza' The Pregnancy Book and The Birth Book--the pregnancy book goes month-by month      The Birth Partner by Maru Kimbrough    Womanly Art of Breastfeeding by La Leche League International   Bestfeeding by Karen Bailey--great  "pictures    Mothering Your Nursing Toddler, by Giulia Gomez.   Addresses dealing with so many of the challenging behaviors of a nursing toddler.  How Weaning Happens, by La Leche League.  Discusses weaning at all ages, from medically necessary weaning of an infant, all the way up to age 5 (or older), with why/why not, and strategies.  Very empowering book both for deciding to wean and deciding not to.    American College of Nurse-Midwives (ACNM) http://www.midwife.org/; look at the informational handouts at http://www.midwife.org/Share-With-Women     www.mymidwife.org    Mother to Baby (Medication and Herbal guidance in pregnancy): http://www.mothertobaby.org  Toll-Free Hotline: 533.113.6647  LactMed (Medication use while breastfeeding): http://toxnet.nlm.nih.gov/newtoxnet/lactmed.htm    Women's Health.gov:  http://www.womenshealth.gov/a-z-topics/index.html    American pregnancy association - http://americanpregnancy.org    Centering Pregnancy (group prenatal care option): http://centeringhealthcare.org    Information about doulas:  Childbirth collective: http://www.childbirthcollective.org/  Doulas of [place] (CHIQUITA):  www.chiquita.org  [facility]  project: http://twincitiesdoulaproject.com/     Early Childhood and Family Education (ECFE):  ECFE offers parents hands-on learning experiences that will nourish a lifetime of teachable moments.  http://ecfe.info/ecfe-home/    March of Dimes www.marchofdimes.com     FDA - Nutrition  www.mypyramid.gov  Under \"For Consumers,\" click on \"pregnant and breastfeeding women.\"      Centers for Disease Control and Prevention (CDC) - Vaccines : http://www.cdc.gov/vaccines/       When researching information on the web, question the validity of websites.  The domains .gov, .edu and.org tend to be more reliable information.  If there are a lot of advertisements, be cautious of the information provided. Stay away from blogs and chat rooms please!             Virtual " "Breastfeeding Support:    During this time of isolation, breastfeeding families need even more community!  Here are some area organizations offering virtual support groups for breastfeeding:    [place] Support Group,  at 10:30 am   Run by ZENOBIA Aviles of [employer] [facility] [facility] [facility]   Go to [employer] Facebook page and click on \"events\" for link   https://www.Acompli.com/events/[email or web address]/  [facility] Milk Hour,  at 2:30 pm    Run by ZENOBIA Gorman   Go to [facility] FB page and send message to get link   https://www.Acompli.Autotask/CloudHealth Technologiesund[facility]/  [facility]/[place] holding virtual meetings the first Tuesday of each month, 8-9 pm, and the   Third Saturday, 10 - 11 am.  Go to [facility] and [place] FB page; message to get link [email or web address]/LLLofGoldDede/?hc_location=[facility] offers a Lactation Lounge every Friday 12pm - 1pm, run by Akira Hernandez Leader   Sign up via link at Jamplify/cbe-lactation   https://www.Jamplify/cbe-lactation  [facility] is offering virtual support groups every Monday, 10:30 am - 12 pm, run by nurse IBCLC   Https://www.[email or web address]/events/[email or web address]/    Prenatal Breastfeeding Classes:      [facility] is offering virtual breastfeeding and  care classes:  https://www.Jamplify/education-workshops  BirthEd childbirth and breastfeeding education offering virtual prenatal breastfeeding classes  https://www.birthedmn.com/workshops    "

## 2022-07-25 NOTE — PROGRESS NOTES
Here with Fidel for a routine prenatal visit at 26w3d.  Fidel return to 3 weeks ago, this is his first prenatal visit!  Reports normal fetal movements. Denies PTL including, regular painful contractions, bleeding, leaking or changes in fetal movement. Offers no questions or concerns.  Discussed pediatric care options.  Breast-feeding plans reviewed.  Will look into insurance coverage for breast pump.  Encouraged to consider CBE options, community resources and [facility] free videos discussed.  Work is going well, trying to limit her lifting.  Connected with care coordinator and Octavio will reach out to WIC.  Did not sign up for a home visiting nurse as she is unsure where they will live permanently.  Discussed that if she is interested in the program, to not let this prevent her from signing up.  11 pound interval weight gain noted.  Skipping meals here and there, still trying to walk frequently.  Encouraged daily walking, monitoring portions, increasing protein and fruits and vegetables. Reviewed  labor precautions, warning signs and when/how to call the on-call CNM.   Completing GCT, hgb, RPR today.  Plan Tdap for fetal protection of pertussis at next visit.

## 2022-07-25 NOTE — PROGRESS NOTES
Clinic Care Coordination Contact  Program: Health Insurance   [facility] Case #:  [facility] Worker:   Jessica #:   Subscriber #: [id number]  Renewal:  Date Applied: 2022    FRW Outreach:  2022: FR[place] of 22. FRW added coverage to Reg and e-mailed [facility] to please bill MA as secondary 22 to current and to see if they can adjust her prior balance. FRW called patient to advise her of this. FRW will follow until hearing back from [facility]. FRW will check account in 1 week.  2022: FRW called patient x3 for our scheduled phone appointment. The line rang and then went silent, patient answered on the 3rd try. We tried to set up an account and it states one already exists. We called [facility] and spoke to Jayla. He provided the username and re-set the password. We completed the online [place] application together over the phone. Patient appears to qualify for MA. FRW will check coverage and follow up in 2 weeks.     Medical Assistance hide details  Sudarshan appears to qualify for Medical Assistance. This is an initial eligibility result based on the information you entered on your application. You will get a health care notice showing your final eligibility results. We may need proof to verify your answers to some of the application questions before your coverage can begin. The notice will tell you if you need to send in proof. .    2022: FRW called patient and we went through the combined alvin screening. Patient is over $1,000 over income for SNAP, however, she appears to qualify for MA based off her pregnancy. We scheduled a phone appointment for 22 at 12:00 pm. FRW will make outreach at that time.     SNAP/CASH Application Screenin. Have you had county benefits before? No  2. How many people in the household, do you eat/buy food together? 1  3. What is your monthly income (include all tax members)? $17.54, 37 (just changed to $18.25)  =$2,812.24 Jan until wage increase   4. Do you have a bank account?   5. Do you have any utility bills (electricity, rent, mortgage, phone, insurance, medical bills, etc.)? Rent $300  6. Do you have social security cards and/or green cards?       Health Insurance:    [facility]    Major Programs  This subscriber has eligibility for MA: Medical Assistance.  Elig Type PX: Pregnant woman  Eligibility Begin Date: 04/01/2022  Eligibility End Date: --/--/----  This subscriber is eligible for the following service types: Medical Care ,  Chiropractic ,  Dental Care ,  Hospital ,  Hospital - Inpatient ,  Hospital - Outpatient ,  Emergency Services ,  Pharmacy ,  Professional (Physician) Visit - Office ,  Vision (Optometry) ,  Mental Health ,  Urgent Care  Prepaid Health Plan  None    Referral/Screening:    [facility] Benefits  Is patient requesting help applying for [facility] benefits?: Yes  Have you recently applied for any [facility] benefits?: No  Do you buy/eat food together?: Yes  What is the monthly gross income for the household (wages, social security, workers comp, and pension)? : 1800     [place]:  [place] No  Is this an insurance renewal?: No  Is this a new insurance application request?: Yes  Have you recently applied for insurance?: No  Do you file taxes?: Yes  How many dependents do you claim?: 0     Any other information for the FRW?: pregnant, has work insurance now, but is cruddy.  Lives with sister and other family, she pays rents and buys food.  Please help with MA alvin and with Snap if she looks eligible.  Thanks.     Care Coordination team will tell patient:   Thank you for answering all the questions, based on screening questions, our Financial Resource Worker will reach out to you with additional questions and next steps.     Stephanie Ahuja,   [facility]  [phone]    Financial Resource Worker Follow Up    Goals:    Goals Addressed as of 7/25/2022 at 9:10 AM                     Today    7/11/22       Financial Wellbeing (pt-stated)   100%  50%    Added 7/6/22 by Stephanie Ahuja LSW      Goal Statement: I will apply for Health Insurance within the next 1-2 weeks if I am eligible.   Date Goal Set:  July 6, 2022  Strengths: motivated.   Barriers:  trouble with website.   Date to Achieve By: 8-6-2022  Patient expressed understanding of goal: yes  Action steps to achieve this goal:  1. I understand a referral was placed to the Financial Resource Worker, I will receive a call within the next 3 business days.    2. I understand the financial worker will make two attempts to call me. If I still need help with this goal, I will connect with my Community Health Worker Marilyn at [phone].                 Intervention and Education during outreach: n/a    FRW Plan: FRW will follow until hearing back from [facility].

## 2022-08-01 ENCOUNTER — PATIENT OUTREACH (OUTPATIENT)
Dept: CARE COORDINATION | Facility: CLINIC | Age: 22
End: 2022-08-01

## 2022-08-01 NOTE — PROGRESS NOTES
Clinic Care Coordination Contact  Program: Health Insurance   County: [facility] Case #:  [facility] Worker:   Jessica #:   Subscriber #: [id number]  Renewal:  Date Applied: 2022    FRW Outreach:  2022: FRW [place] and [place] as of 22. FRW added coverage to Reg and e-mailed [facility] to please bill MA as secondary 22 to current and to see if they can adjust her prior balance. FRW called patient to advise her of this. FRW will follow until hearing back from [facility]. FRW will check account in 1 week.  2022: FRW called patient x3 for our scheduled phone appointment. The line rang and then went silent, patient answered on the 3rd try. We tried to set up an account and it states one already exists. We called [employer] and spoke to Jayla. He provided the username and re-set the password. We completed the online [place] application together over the phone. Patient appears to qualify for MA. FRW will check coverage and follow up in 2 weeks.     Medical Assistance hide details  Sudarshan appears to qualify for Medical Assistance. This is an initial eligibility result based on the information you entered on your application. You will get a health care notice showing your final eligibility results. We may need proof to verify your answers to some of the application questions before your coverage can begin. The notice will tell you if you need to send in proof. .    2022: FRW called patient and we went through the combined alvin screening. Patient is over $1,000 over income for SNAP, however, she appears to qualify for MA based off her pregnancy. We scheduled a phone appointment for 22 at 12:00 pm. FRW will make outreach at that time.     SNAP/CASH Application Screenin. Have you had county benefits before? No  2. How many people in the household, do you eat/buy food together? 1  3. What is your monthly income (include all tax members)? $17.54, 37 (just changed to $18.25)  =$2,812.24 Jan until wage increase   4. Do you have a bank account?   5. Do you have any utility bills (electricity, rent, mortgage, phone, insurance, medical bills, etc.)? Rent $300  6. Do you have social security cards and/or green cards?       Health Insurance:    [facility]    Major Programs  This subscriber has eligibility for MA: Medical Assistance.  Elig Type PX: Pregnant woman  Eligibility Begin Date: 04/01/2022  Eligibility End Date: --/--/----  This subscriber is eligible for the following service types: Medical Care ,  Chiropractic ,  Dental Care ,  Hospital ,  Hospital - Inpatient ,  Hospital - Outpatient ,  Emergency Services ,  Pharmacy ,  Professional (Physician) Visit - Office ,  Vision (Optometry) ,  Mental Health ,  Urgent Care  Prepaid Health Plan  None    Referral/Screening:    [facility] Benefits  Is patient requesting help applying for [facility] benefits?: Yes  Have you recently applied for any county benefits?: No  Do you buy/eat food together?: Yes  What is the monthly gross income for the household (wages, social security, workers comp, and pension)? : 1800     [place]:  [place] No  Is this an insurance renewal?: No  Is this a new insurance application request?: Yes  Have you recently applied for insurance?: No  Do you file taxes?: Yes  How many dependents do you claim?: 0     Any other information for the FRW?: pregnant, has work insurance now, but is cruddy.  Lives with sister and other family, she pays rents and buys food.  Please help with MA alvin and with Snap if she looks eligible.  Thanks.     Care Coordination team will tell patient:   Thank you for answering all the questions, based on screening questions, our Financial Resource Worker will reach out to you with additional questions and next steps.     Stephanie Ahuja,   [facility]  [phone]    Financial Resource Worker Follow Up    Goals:       Intervention and Education during  outreach: n/a    FRW Plan: n/a

## 2022-08-08 ENCOUNTER — PATIENT OUTREACH (OUTPATIENT)
Dept: CARE COORDINATION | Facility: CLINIC | Age: 22
End: 2022-08-08

## 2022-08-08 NOTE — PROGRESS NOTES
Clinic Care Coordination Contact    Community Health Worker Follow Up    Care Gaps:     Health Maintenance Due   Topic Date Due     PREVENTIVE CARE VISIT  Never done     ADVANCE CARE PLANNING  Never done     HEPATITIS B IMMUNIZATION (3 of 3 - 3-dose primary series) 08/25/2015     HPV IMMUNIZATION (3 - 3-dose series) 06/09/2016     DTAP/TDAP/TD IMMUNIZATION (7 - Td or Tdap) 11/22/2021     COVID-19 Vaccine (3 - Booster for Pfizer series) 11/26/2021     MATERNAL SCREENING  Never done     REPEAT ANTIBODY SCREEN (OB)  08/05/2022       Goals: Goals have been completed.     Intervention and Education during outreach: CHW will route patients chart to lead CCC SW to review for maintence status. CHW gave patient contact information and encouraged patient to reach out with any questions or concerns.    CHW Note:    CHW contacted patient for CCC outreach.    Patient stated that she has been in touch with the FRW. Patient was approved for Medical Assistance but denied SNAP benefits due to income. Patient states that she has not received her insurance card yet. Patient was approved for benefits recently and should be receiving insurance card soon.     Patient has completed all active goals. CHW will route chart to lead CCC SW to review for maintance status. Patient denied any other needs or concerns at this time but will reach out to CCC as needed.     CHW Plan: CHW will continue to support patient with goals through routine scheduled outreach. CHW will outreach in one month on 9/8/22.      Marilyn Price  Community Health Worker   [facility] Care Coordination  [facility]   [email or web address]  Office: [phone]

## 2022-08-15 ENCOUNTER — PATIENT OUTREACH (OUTPATIENT)
Dept: CARE COORDINATION | Facility: CLINIC | Age: 22
End: 2022-08-15

## 2022-08-15 NOTE — PROGRESS NOTES
Contact   Chart Review     Situation: Patient chart reviewed by .    Background: Enrolled in care coordination.     Assessment: Has completed her goal and agreed to go on maintenance status    Plan/Recommendations: delegating to CHW to call in 2 months to assess graduation or if patient has additional needs. [facility] CC available as needed.     Stephanie Ahuja,   [facility]  [phone]

## 2022-08-19 ENCOUNTER — PATIENT OUTREACH (OUTPATIENT)
Dept: CARE COORDINATION | Facility: CLINIC | Age: 22
End: 2022-08-19

## 2022-08-19 NOTE — PROGRESS NOTES
Clinic Care Coordination - Chart Review Only    Situation/Background: Patient chart reviewed by care coordinator related to [facility] conversion.    Assessment: Patient continues to be followed by Clinic Care Coordination.    Plan: Patient's chart updated to align with [facility] [facility] program for ongoing patient management.    Stephanie Ahuja,   [facility]  [phone]

## 2022-09-08 ENCOUNTER — PRENATAL OFFICE VISIT (OUTPATIENT)
Dept: MIDWIFE SERVICES | Facility: CLINIC | Age: 22
End: 2022-09-08
Payer: COMMERCIAL

## 2022-09-08 VITALS
BODY MASS INDEX: 39.37 KG/M2 | WEIGHT: 224 LBS | SYSTOLIC BLOOD PRESSURE: 112 MMHG | DIASTOLIC BLOOD PRESSURE: 66 MMHG | HEART RATE: 72 BPM

## 2022-09-08 DIAGNOSIS — Z34.00 SUPERVISION OF NORMAL FIRST PREGNANCY, ANTEPARTUM: Primary | ICD-10-CM

## 2022-09-08 DIAGNOSIS — Z91.89 AT RISK FOR COMPLICATION OF PREGNANCY: ICD-10-CM

## 2022-09-08 DIAGNOSIS — O26.00 EXCESSIVE WEIGHT GAIN AFFECTING PREGNANCY: ICD-10-CM

## 2022-09-08 PROCEDURE — 99207 PR PRENATAL VISIT: CPT | Performed by: ADVANCED PRACTICE MIDWIFE

## 2022-09-08 PROCEDURE — 90715 TDAP VACCINE 7 YRS/> IM: CPT | Performed by: ADVANCED PRACTICE MIDWIFE

## 2022-09-08 PROCEDURE — 90471 IMMUNIZATION ADMIN: CPT | Performed by: ADVANCED PRACTICE MIDWIFE

## 2022-09-08 NOTE — PROGRESS NOTES
Prior to immunization administration, verified patients identity using patient s name and date of birth. Please see Immunization Activity for additional information.     Screening Questionnaire for Adult Immunization    Are you sick today?   No   Do you have allergies to medications, food, a vaccine component or latex?   No   Have you ever had a serious reaction after receiving a vaccination?   No   Do you have a long-term health problem with heart, lung, kidney, or metabolic disease (e.g., diabetes), asthma, a blood disorder, no spleen, complement component deficiency, a cochlear implant, or a spinal fluid leak?  Are you on long-term aspirin therapy?   No   Do you have cancer, leukemia, HIV/AIDS, or any other immune system problem?   No   Do you have a parent, brother, or sister with an immune system problem?   No   In the past 3 months, have you taken medications that affect  your immune system, such as prednisone, other steroids, or anticancer drugs; drugs for the treatment of rheumatoid arthritis, Crohn s disease, or psoriasis; or have you had radiation treatments?   No   Have you had a seizure, or a brain or other nervous system problem?   No   During the past year, have you received a transfusion of blood or blood    products, or been given immune (gamma) globulin or antiviral drug?   No   For women: Are you pregnant or is there a chance you could become       pregnant during the next month?   Yes   Have you received any vaccinations in the past 4 weeks?   No     Immunization questionnaire was positive for at least one answer.  Notified MARVIN Grajeda, ROMAN.        Per orders of MARVIN Grajeda CNM, injection of TDap given by Tameka Sanchez MA. Patient instructed to remain in clinic for 15 minutes afterwards, and to report any adverse reaction to me immediately.       Screening performed by Tameka Sanchez MA on 9/8/2022 at 11:04 AM.

## 2022-09-08 NOTE — PROGRESS NOTES
Octavio presents to the clinic by herself.  26-week lab results reviewed: 1 hour GCT 82 mg/dL, hemoglobin 12.2 g/dL and RPR negative.  Accepting of Tdap vaccine today and understands  benefits.  Plans to bring  to [facility], provider not chosen yet.  COVID-19 home test + 2022: Her boyfriend Fidel and sister both had COVID-19 around the same time.  Patient lives with her sister, niece and sometimes brother.  Her partner Fidel lives with his mother.  Experienced headache, cough, congestion and sore throat.  She denied fever, chills or gastrointestinal symptoms.  Consider fetal growth ultrasound at 36 weeks.  Baby has been active, and she denies regular uterine contractions, loss of fluid or vaginal bleeding.  Her feet have been swollen for the last week but she denies swelling in her hands or face.  Currently denies headache, visual disturbances or right upper quadrant abdominal pain.  Has seen stars and had blurry vision previously lasting only seconds but denies scotomata.  Total weight gain thus far excessive at 43 pounds with pregravid BMI of 31.   labor precautions and danger signs and symptoms reviewed.  All questions answered.  Encouraged daily fetal movement counting and to call or return to clinic with any questions, concerns, or as needed.

## 2022-09-16 ENCOUNTER — HOSPITAL ENCOUNTER (OUTPATIENT)
Facility: HOSPITAL | Age: 22
End: 2022-09-16
Payer: COMMERCIAL

## 2022-09-18 ENCOUNTER — HEALTH MAINTENANCE LETTER (OUTPATIENT)
Age: 22
End: 2022-09-18

## 2022-09-23 ENCOUNTER — MEDICAL CORRESPONDENCE (OUTPATIENT)
Dept: HEALTH INFORMATION MANAGEMENT | Facility: CLINIC | Age: 22
End: 2022-09-23

## 2022-09-23 ENCOUNTER — PRENATAL OFFICE VISIT (OUTPATIENT)
Dept: MIDWIFE SERVICES | Facility: CLINIC | Age: 22
End: 2022-09-23
Payer: COMMERCIAL

## 2022-09-23 VITALS
SYSTOLIC BLOOD PRESSURE: 126 MMHG | WEIGHT: 232 LBS | DIASTOLIC BLOOD PRESSURE: 70 MMHG | BODY MASS INDEX: 40.77 KG/M2 | HEART RATE: 72 BPM

## 2022-09-23 DIAGNOSIS — U07.1 COVID-19 AFFECTING PREGNANCY IN THIRD TRIMESTER: ICD-10-CM

## 2022-09-23 DIAGNOSIS — Z13.31 POSITIVE DEPRESSION SCREENING: ICD-10-CM

## 2022-09-23 DIAGNOSIS — O98.513 COVID-19 AFFECTING PREGNANCY IN THIRD TRIMESTER: ICD-10-CM

## 2022-09-23 DIAGNOSIS — O26.00 EXCESSIVE WEIGHT GAIN AFFECTING PREGNANCY: ICD-10-CM

## 2022-09-23 DIAGNOSIS — Z91.89 AT RISK FOR COMPLICATION OF PREGNANCY: ICD-10-CM

## 2022-09-23 DIAGNOSIS — Z71.89 COUNSELING AND COORDINATION OF CARE: ICD-10-CM

## 2022-09-23 DIAGNOSIS — Z34.00 SUPERVISION OF NORMAL FIRST PREGNANCY, ANTEPARTUM: Primary | ICD-10-CM

## 2022-09-23 PROCEDURE — 99207 PR PRENATAL VISIT: CPT | Performed by: ADVANCED PRACTICE MIDWIFE

## 2022-09-23 NOTE — PROGRESS NOTES
Next visit, ask about influenza vaccination and COVID-19 booster #1.  She DECLINES today.  Octavio presents for prenatal care with Fidel.  Her affect is somewhat flat, and she scores a 9 on EPDS, 1 to question #10.  She denies active suicidal or homicidal ideation, rather, when she feels overwhelmed, sometimes she believes it would be easier if she was not here.  Encouraged to contact on-call CNM or [facility] if symptoms worsen.  This writer gave written information regarding pregnancy and postpartum support [place].  She declines referral to counseling and therapy or to consider initiating medication therapy.  Written information given regarding GBS, perineal massage, water for birth, nitrous oxide, VTE, lactation resources, postpartum depression, wellbeing, depression and anxiety.  Next visit: GBS RV culture and hemoglobin.  Fetal heart tones auscultated best in the right upper quadrant of her abdomen.  Ultrasound ordered for fetal presentation and fetal growth secondary to COVID-19 infection 4 weeks ago.  Pregravid BMI 31 with a 51 pound weight gain, 8 pounds in 2 weeks.  Lower extremity edema without edema of hands or face.  She denies headache, visual disturbances, right upper quadrant abdominal pain, regular uterine contractions, loss of fluid or vaginal bleeding.  Extensive discussion regarding labor and birth.  Resources given for childbirth education.  Birth plan completed by patient and reviewed.  She is interested in placenta encapsulation for which she will be responsible.   labor precautions and danger signs and symptoms reviewed.  All questions answered.  Encouraged daily fetal movement counting and to call or return to clinic with any questions, concerns, or as needed.

## 2022-09-30 ENCOUNTER — HOSPITAL ENCOUNTER (OUTPATIENT)
Dept: ULTRASOUND IMAGING | Facility: HOSPITAL | Age: 22
Discharge: HOME | End: 2022-09-30
Attending: ADVANCED PRACTICE MIDWIFE | Admitting: ADVANCED PRACTICE MIDWIFE
Payer: COMMERCIAL

## 2022-09-30 ENCOUNTER — PRENATAL OFFICE VISIT (OUTPATIENT)
Dept: MIDWIFE SERVICES | Facility: CLINIC | Age: 22
End: 2022-09-30
Payer: COMMERCIAL

## 2022-09-30 VITALS
DIASTOLIC BLOOD PRESSURE: 66 MMHG | HEIGHT: 63 IN | HEART RATE: 84 BPM | BODY MASS INDEX: 41.71 KG/M2 | SYSTOLIC BLOOD PRESSURE: 110 MMHG | WEIGHT: 235.4 LBS

## 2022-09-30 DIAGNOSIS — U07.1 COVID-19 AFFECTING PREGNANCY IN THIRD TRIMESTER: ICD-10-CM

## 2022-09-30 DIAGNOSIS — Z34.00 SUPERVISION OF NORMAL FIRST PREGNANCY, ANTEPARTUM: Primary | ICD-10-CM

## 2022-09-30 DIAGNOSIS — O26.00 EXCESSIVE WEIGHT GAIN AFFECTING PREGNANCY: ICD-10-CM

## 2022-09-30 DIAGNOSIS — O98.513 COVID-19 AFFECTING PREGNANCY IN THIRD TRIMESTER: ICD-10-CM

## 2022-09-30 DIAGNOSIS — Z34.00 SUPERVISION OF NORMAL FIRST PREGNANCY, ANTEPARTUM: ICD-10-CM

## 2022-09-30 LAB — HGB BLD-MCNC: 11.5 G/DL (ref 11.7–15.7)

## 2022-09-30 PROCEDURE — 87653 STREP B DNA AMP PROBE: CPT | Performed by: ADVANCED PRACTICE MIDWIFE

## 2022-09-30 PROCEDURE — 76816 OB US FOLLOW-UP PER FETUS: CPT

## 2022-09-30 PROCEDURE — 99207 PR PRENATAL VISIT: CPT | Performed by: ADVANCED PRACTICE MIDWIFE

## 2022-09-30 PROCEDURE — 36415 COLL VENOUS BLD VENIPUNCTURE: CPT | Performed by: ADVANCED PRACTICE MIDWIFE

## 2022-09-30 PROCEDURE — 76819 FETAL BIOPHYS PROFIL W/O NST: CPT

## 2022-09-30 PROCEDURE — 85018 HEMOGLOBIN: CPT | Performed by: ADVANCED PRACTICE MIDWIFE

## 2022-09-30 RX ORDER — LANOLIN ALCOHOL/MO/W.PET/CERES
1000 CREAM (GRAM) TOPICAL DAILY
Qty: 90 TABLET | Refills: 3 | Status: SHIPPED | OUTPATIENT
Start: 2022-09-30 | End: 2022-10-21

## 2022-09-30 RX ORDER — MULTIVIT WITH MINERALS/LUTEIN
250 TABLET ORAL DAILY
Qty: 90 TABLET | Refills: 3 | Status: SHIPPED | OUTPATIENT
Start: 2022-09-30 | End: 2022-10-21

## 2022-09-30 NOTE — PATIENT INSTRUCTIONS
"[facility] Nurse Midwives [facility]  - Contact information:  Appointment line and to get a hold of CNM in clinic Monday-Friday 8 am - 5 pm:  [phone].  There are some clinics with early start times (1st appointment 7:40 am) and others with evening hours (last appointment 6:20 pm).  Most are typically open from 8 am to 5 pm.    CNM on call answering service: [phone].  Specify your hospital of choice and leave a brief message for CNM;  will then page CNM who is on call at your specified hospital and you should receive a call back with 15 minutes.  Be sure that your ringer is audible and that you can accept blocked calls so that we can get back in touch with you! This number should be reserved for urgent needs if during the day, before 8 am, after 5 pm, weekends, holidays.    Contact the on-call CNM with warning signs, such as:  vaginal bleeding   Vaginal discharge and itching or pain and burning during urination  Leg/calf pain or swelling on one side  severe abdominal pain  nausea and vomiting more than 4-5 times a day, or if you are unable to keep anything down  fever more than 100.4 degrees F.     [employer]  After each of your visits you are welcome to check [place] for your visit summary including education and links to information relevant to your pregnancy and/or well woman care.   Find the \"Visits\" tab at the top of the page, you will see a list of recent visits and for each visit a for link for \"View After Visit Summary.\" View of your After Visit Summary will allow you to read our recommendations from your visit, review any education provided, and link to websites with useful information.   If you have any questions or difficulty navigating [place], please feel free to contact us and we will do our best to direct you.  Meet the Midwives from [facility]  You are invited to an informational meet and greet with [facility]'s [facility] Certified Nurse-Midwives. Our " "free \"Meet the Midwives\" event is a great opportunity to learn about our midwives' philosophy and experience, the hospitals where we can assist with your birth, and answer questions you may have. Partners, friends, and family are welcome to attend. Currently, this is a virtual event.  Date  First Tuesday of every month at 7 pm.    Link to next (live) meeting  [email or web address] [place] [facility]  To Join by Telephone (audio only) Call:   [phone] Phone Conference ID: [phone]#    Touring the Maternity Care Center  At this time we are offering a virtual tour of the Maternity Care Centers at both [facility] and [facility]:   [facility] Maternity Care:   [email or web address]:   [email or web address] [place]    Postpartum Depression  The first weeks of caring for a  baby are more than a full-time job. Although it is often a happy time, your feelings and moods may not be what you expected. Many women experience  baby blues.  Here are some tips to help you understand when feelings of sadness are normal, and when you should call your health care provider.    What are the baby blues?  As many as 3 of every 4 women will have short periods of mood swings, crying, or feeling cranky or restless during the first weeks after birth. These feelings can be worse when you are tired or anxious. Women who have the baby blues often say they feel like crying but don t know why. Baby blues usually happen in the first or second week postpartum (after you give birth) and last less than a week. If you are not sleeping, becoming more upset, don t feel like you can take care of your baby, or your sadness lasts 2 weeks or more, call your health care provider.    What is postpartum " depression?  About one in every 5 women will develop depression during the first few months postpartum that may be mild, moderate, or severe. Women who have postpartum depression may have some of these symptoms:  Feeling guilty   Not able to enjoy your baby and feeling like you are not bonding with your baby    Not able to sleep, even when the baby is sleeping  Sleeping too much and feeling too tired to get out of bed  Feeling overwhelmed and not able to do what you need to during the day  Not able to concentrate  Don t feel like eating  Feeling like you are not normal or not yourself anymore  Not able to make decisions  Feeling like a failure as a mother  Feeling lonely or all alone  Thinking your baby might be better off without you  If you have any of these symptoms, call your health care provider!    Which symptoms of postpartum depression are dangerous?  Sometimes a woman with postpartum depression will have thoughts of harming herself or her baby. If you find yourself thinking about hurting yourself or your baby, call your health care provider immediately.    MOTHERHOOD: THE EARLY DAYS  You prepare for the birth of your baby for many months during pregnancy, and then the first months at home after your baby is born can be a quiet, gentle time of getting to know this new person who has come to live in your home. But for most women it is not all quiet or sweet. And for some women it is a very hard time.  What Can I Expect in the First Few Months After the Baby Comes?  New mothers and their families face many challenges in the first few months:  Your body and your hormones have to get back to normal.  You and the baby will be learning to breastfeed.  Babies only sleep a few hours at a time. The entire family will have a hard time getting enough sleep.  You and your family need to learn how to parent this new family member.  If you have a partner, you have to figure out how to stay together as a couple and maybe  even start to have sex again.  You may have to figure out how to keep from getting pregnant again right away.  You may need to return to work and find day care.    How Long Will it Take for My Body to Get Back to Normal?  Some changes will occur quickly. Others will not occur as quickly.  Your uterus, cervix, and vagina will all shrink to their nonpregnant size in about 2 weeks. Your vagina may be tender and dry for a few months--especially if you are breastfeeding.  If you had stitches or hemorrhoids, your   bottom   will be sore for 2 weeks or more.  For some women who have problems urinating, it can take several months for you to be able to hold your urine when you cough or sneeze or suddenly  something heavy.  Your breast milk will   come in   2 to 3 days after the birth of your baby. It will take 6 to 8 weeks for you and the baby to get the hang of breastfeeding and find a pattern. During these first weeks, you can have engorged breasts at times and often leak milk.  Your stomach and intestines all have to fall back into place. You may have a lot of gas for a few weeks.  You may be constipated--especially if you are breastfeeding.  Your stretched stomach muscles can recover in a few weeks, but for some women it takes longer--6 months or a year--to recover.  If you had a  delivery, you may have pain or numbness around the incision for 6 months or more.  Losing the weight you gained during pregnancy will probably take 6 months to a year. Have patience! It took 40 weeks to get here. Give yourself 40 weeks to get back.    What Can I Expect When My Hormones Change?  About 75% of all women will get the   blues.   This usually starts about 3 days after the birth of your baby. You may cry easily and feel very, very tired. A few women become very depressed. If you had a  delivery or your new baby was sick, you are at a higher risk for depression.  Call your health care provider right away if you  cannot care for yourself or your baby, if you feel very nervous or worried, if you cannot stop crying, or if you are having thoughts of hurting yourself or your baby.    Taking Care of Yourself  While you are still pregnant:  Talk with your partner and your family about the time ahead. Arrange for someone to help you during the first weeks at home if you can.  Talk with your health care provider about birth control options and make a plan before the baby comes.  If you are worried about how to parent a , take parenting classes. You will learn a lot about how babies act and you will make some friends who are going through the same thing at the same time. Most [facility] have these classes.  Arrange for someone to help with baby care if you can.  After the baby comes:  Ask for help. Let other people do the cooking and cleaning and run the house. Focus on yourself and your baby.  Sleep whenever you can. Try not to be tempted to   get some things done   when the baby sleeps. This is your time to sleep, too.  Drink lots of water. You will need at least 6 big glasses of water everyday to avoid constipation and make enough breast milk. Every time you sit down to breastfeed, have a big glass of water with you to drink while you are nursing.  Eat lots of vegetables and fruit. You will need lots of vitamins and fiber to help your body get back to normal. This will also help you avoid constipation.  Go outside and walk. Babies can go outside even if it is very cold. Fresh air and sunshine will do you both good.  Take sitz baths. Put about 6 inches of warm water in your bathtub and sit in there for 15 minutes 2 to 3 times a day. This will help your   bottom   heal more quickly. It will also give you 15 minutes of private time!  Talk to other mothers. Join a new parents group. Call Gianfranco and go to [facility] meetings if you are breastfeeding.     With your partner:  Keep talking. Share the experience.  Spend time  alone. Even a 30-minute walk can be a date.  Start a birth control method. You can get pregnant before you even have a period. It is very important to use birth control if you do not want to get pregnant again right away.  When you have sex, use a lubricant. A lot of lubricant! Take it slow.  The first few months after a baby comes can be a lot like floating in a jar of honey--very sweet and quinn, but very sticky, too. Take time to enjoy the good parts. Remind yourself that this time will pass. Bon voyage!    FOR MORE INFORMATION  For questions about depression during and after pregnancy:  http://www.womenshealth.gov/publications/our-publications/fact-sheet/depression-pregnancy.html   After birth: The first 6 weeks:  [email or web address]://[email or web address]/[email or web address]-[email or web address]-Recovery   Breastfeeding resources:  http://www.[employer].org/health-info/getting-breastfeeding-off-to-a-good-start/    Preparing for your baby:       Car Seat Clinics:  https://dps.[place].gov/divisions/ots/child-passenger-safety/Pages/car-seat-checks.aspx  [facility]    Free Car Seat Distribution Facilities     By Appt. Address Contact Information (For appointment)      \Yes [employer]\[address]\[place],\cell Christine Cardona)-\[email or web address]      Yes [facility]\ [facility]\[place],\[place] Sarai Thompson)[phone]\[email or web address]      Yes [facility]\[address]\[place],\[place] Maggie Monk)[email or web address]      Immunizations:  http://www.cdc.gov/vaccines/schedules/easy-to-read/child.html    [place] Screening Program  Http://www.health.[facility].mn.us/newbornscreening/  [place] newborns are tested soon after birth for more than 50 hidden, rare disorders, including hearing loss and critical congenital heart disease (CCHD). This site provides resources and information for families and providers.    When to call:    Appointment line and to get a hold of a midwife in clinic Monday-Friday 8 am - 5 pm:  [phone].  There are some clinics with early start times (1st appointment 7:40 am) and others with evening hours (last appointment 6:20 pm).  Most are typically open from 8 am to 5 pm.    CNM on call answering service: [phone].  Specify your hospital of choice and leave a brief message for a midwife;  will then page a midwife who is on call at your specified hospital and you should receive a call back with 15 minutes.  Be sure that your ringer is audible and that you can accept blocked calls so that we can get back in touch with you! This number should be reserved for urgent needs if during the day, before 8 am, after 5 pm, weekends, holidays.    Contact the on-call CNM with warning signs, such as:  vaginal bleeding   Vaginal discharge and itching or pain and burning during urination  Leg/calf pain or swelling on one side  severe abdominal pain  nausea and vomiting more than 4-5 times a day, or if you are unable to keep anything down  fever more than 100.4 degrees F.     Make plans for transportation and  as needed for when you are going to the hospital.    Ask your health care provider about vaccinations you may need following delivery. By now, you should have received a Tdap immunization to protect against pertussis or whooping cough. Fathers and family members who will be in close contact with the baby should also receive a Tdap shot at least two weeks before the expected birth of the baby if they have not had a Td (tetanus) shot for at least two years.    Your midwife may offer to check your cervix for changes. If you are past your due date, discuss the next steps leading to delivery with your midwife. If you don't start labor on your own by 41 or 42 weeks, your midwife may recommend giving you medicines to ripen your cervix and start labor.  Induction of labor:  http://onlinelibrary.gabriel.com/store/10.1016/j.jmwh.2008.04.018/asset/j.jmwh.[email or web address].pdf?v=1&t=tnff7diz&[email or web address]    Tell your midwife or physician how you plan to feed your baby (breast or bottle), who you have chosen to do pediatric care for your baby, and if you have a boy, whether you have chosen to have him circumcised. You will need a car seat correctly installed in your vehicle to bring your baby home. As you start to set up the nursery at home for your baby, make sure the crib is safe. The mattress needs to fit snugly against the edges of the crib. If you can fit a soda can between the bars, they are too far apart and can allow the baby's head to caught between them.    Learn about infant care and feeding, including information about infant CPR. We recommend that you put your baby to sleep on his or her back to reduce the chance of Sudden Infant Death Syndrome (SIDS). To maintain a healthy environment in which your child can grow, it's best to keep your home smoke-free. By preparing ahead, your transition into parenthood will go smoothly for you and your baby.    Your midwife will want to see you for a checkup 2 to 6 weeks after delivery.      Making Plans for Feeding My Baby    By this point, you probably have read a lot about feeding your baby.  Breastfeed or formula? Each mother s decision is her own and [facility] respects you and your choices. We ve gathered information on both breastfeeding and formula feeding to help with your decision. Talking with your physician or nurse-midwife can also help in your decision.  However you plan to feed your baby, [facility] Maternity Care Centers encourage rooming in with your baby, skin-to-skin contact and feeding your baby based on his or her cues.    Skin-to-skin contact  Being close to mom helps your baby adjust to life outside of the womb.  It helps your baby regulate their body temperature, heart rate, and breathing.  Your  baby will usually be placed skin-to-skin immediately following birth or as soon as possible, if medical intervention is needed.    Rooming-In  Having your baby stay with you in your room is called  rooming-in .  Keeping your baby in your room helps you to learn how to care for your baby by getting to know your baby s cues, body rhythms and sleep cycle.       Cue-based feeding  Cues (signals) are baby s way of telling you what he or she wants.  When you learn your infant s cues, you know how to care for and feed your baby.   Feeding cues are the licking and smacking of lips, bringing their fist to their mouth, and a reflex called  rooting - where baby turns and opens his or her mouth, searching for the breast or bottle.  Crying is a late feeding cue.  Babies can feed frequently, often at least 8 times in 24 hours.  Breastfeeding facts  Breast milk is the best source of nutrition for your baby and is available at birth. In the first couple of days, your milk volume is already starting to increase, though it may not be noticeable. Breastfeed frequently to increase your milk supply. Within three to five days, you will begin to notice larger milk volumes. An increase in breast size, heaviness and firmness are often described as the milk  coming in.  Frequent breastfeeding can help breasts from getting overly firm and painful. You will know the baby is getting enough milk if your baby is having wet and dirty diapers and gaining weight.     If your goal is to exclusively breastfeed, it is important to not use any formula or artificial nipples (including bottles and pacifiers) while your baby is learning to breastfeed.  While it may seem like an  easy  option to give your baby a bottle, formula should only be given if there is a medical reason for your baby to have it.    Positioning and attachment   Get comfortable.  Use pillows as needed to support your arms and baby.  Hold baby close at the level of your breast, facing  you in a tummy to tummy position.  Skin to skin helps with this.  Position the baby with his or her nose by the nipple.  There should be a straight line from baby s ear to shoulder to hips.  Tickle your baby s lips or wait for baby to open mouth wide, bring baby to breast by leading with the chin.  Aim the nipple at the roof of baby s mouth.  A rapid sucking pattern is followed by longer, drawing pattern with occasional swallows heard.  When baby is correctly latched, your nipple and much of the areola are pulled well into baby s mouth.      Returning to work or school  Focus on a good start to breastfeeding.  Many women continue to provide breastmilk for their baby when they return to work or school.  Making plans about where to pump and store milk can make the transition go well.  Talk with other mothers who have also returned to work or school for tips and support.  Your employer s Human Resource department may be a resource as well.     Returning to work or school: (continued)   babies can mean fewer  sick  days for you.  A quality breast pump will also save time and add comfort.  Check with your insurance prior to giving birth for breast pump coverage.  Many insurance companies include a pump within your benefits.  Wait until your baby is at least three weeks old to introduce a bottle for the first time.  Have someone besides you give the bottle.  Breastfeed when you are with your baby. Reserve your bottles of breast milk for when you are away.   Your breasts will need to be  emptied  either by your baby or a pump.  Plan to pump at least twice in an eight hour day.  If you cannot pump at work, continue breastfeeding at home. Any amount of breast milk is worth giving to your baby.    Formula feeding facts  If you are planning to use formula to feed your baby, you will want to make some preparations ahead of time. Talk to your doctor or nurse-midwife about what type of formula to use. Some are  iron-fortified, meaning they have extra iron in them. You will want to purchase formula and bottles before your baby is born to be sure you are ready after you return from the hospital. The [facility] do not provide formula samples to take home.    Be sure to follow formula mixing directions closely. Regular milk in the dairy case at the grocery store should not be given to babies under 1 year old. Baby formula is sold in several forms including:  Ready-to-use. This is the most expensive, but no mixing is necessary.  Concentrated liquid. This is less expensive than ready-to-use and you mix with water.  Powder. This is the least expensive. You mix one level scoop of powdered formula with two ounces of water and stir well.    Most babies need 2.5 ounces of formula per pound of body weight each day. This means an 8-pound baby may drink about 20 ounces of formula a day; however, this is just an estimate. The most important thing is to pay attention to your baby s cues.  If your baby is always fussy, needs more iron or has certain food allergies, your physician may suggest you change your baby s formula to a different kind.     How do I warm my baby s bottles?  You may feed your baby a bottle without warming it first. It is OK for the breast milk or formula to be cool or room temperature. If your baby seems to prefer it warmed, you can put the filled bottle in a container of warm water and let it stand for a few minutes. Check the temperature of the liquid on your skin before feeding it to your baby; to be sure it isn t too hot. Do not heat bottles in the microwave. Microwaves heat food and liquids unevenly, and this can cause hot spots that can burn your baby.    How do I clean and sterilize bottles?  Sterilize bottles and nipples before you use them for the first time. You can do this by putting them in boiling water for 5 minutes. After that first time, you can wash them in hot and soapy water. Rinse them  carefully to be sure there is no soap left on them. You can also wash them in the .      Am I in Labor?  What is labor? Labor is the work that your body does to birth your baby. Your uterus (the womb) contracts(tightens). The contractions(labor pains) push your baby down onto your cervix(the opening ofyour uterus). Thispressure causesyour cervix to open. When your cervix iscompletely open (10 centimeters dilated), you will push your baby through your vagina and out into the world.  What do contractions feel like? When contractionsfirst start, theyusually feellike cramps duringyour period. Sometimesyoufeelpain in your back. Mostoften,contractions feel like muscles pulling painfully in your lower belly. At first, the contractions will probably be 15 to 20 minutes apart.They maybe irregular and will not feel too painful. As labor goes on, the contractionsget stronger,closer together, more consistent, and more painful.  How do I time the contractions? When the contractionsseem to be coming regularly, youshould start to time them.You time your contractions by counting the number of minutes from the start of one contraction to the start of the next contraction.  What should I do during early labor when the contractions start? If it is night andyoucan sleep, do so. If it happensduring the day, there are some things you can do to take care of yourself at home: Walk. If the painsyou are having are reallabor, walking will makethecontractionscome closer together and they will be stronger,but you will be able to cope with them better if you are standing or moving around. If the contractions are early labor ones that come andgo (sometimes called false labor), walkingcan make them go away. Take a shower or bath. This will help you relax. Eat. Labor is a big event.Your body needs a lot of energy to be effective.Eat whatever you feel like eating. Drink water. Not drinking enough water can cause contractions to not be as  effectiveas theyshould be.You need to be well hydrated (drinking enoughwater) to help your body work well during labor. Take a na p. If youfeel tired, lay down on your side and get all the rest you can. It helps to be rested when you go intoactive labor. Do something you enjoy. Spend time with family. Watch a movie. Distraction will help you relax. Get a massage. If your labor is in your back, a strong massage on your lower back may feel very good. Getting a foot massage or having a partner rub your feet can also be very relaxing. Don t panic. You can do this. Your body was made for this. You are strong!  When should I call my health care provider if I think I am in labor? Your contractions have been 5 minutes or less apart for at least an hour. Your contractions are becoming so painful youcannot walk or talk during one. You think your amniotic sac (bag of haddad) breaks. You may have a big gush of amniotic fluid (water) or just fluid that runs down your legs when you walk or move or change position.  Are there other reasons to call my health care provider? If you are concerned about anything, don t hesitate to call your health care provider.You should definitely call your health care provider or go to the hospital if:  It is 3 weeks or more before your due date, and you are having contractions.  You have vaginal bleeding that is more than your period, soaks your underwear, or runs down your legs.  You have sudden severe pain that does not go away with rest.  Your baby has not movedfor several hours.  You are leaking greenish fluid.    For More Information: http://onlinelibrary.gabriel.com/doi/10.1111/jmwh.73283/epdf     US Department of Health and Human Services: Signs oflabor,labor stages, and types of birth  http://womenshealth.gov/pregnancy/childbirth-beyond/labor-birth.html#a      Childbirth and Parenting Education:       Everyday Miracles:   https://www.everyday-miracles.org/    Free Video Series from [place]: https://nursing.[facility]/academics/specialty-areas/nurse-midwifery/having-baby-prenatal-videos/having-baby-prenatal-and    [facility]: http://Beijing Digital orthodox TechnologyZucker Hillside HospitalSQMOS/   [phone]  [place]: (education, yoga & wellness) www.Intersection TechnologiesaExpedit.us  Enlightened Mama: www.enlightenedmama.[employer]   Childbirth collective: (Parent topic nights)  www.childbirthcollective.org/  Hypnobabies:  www.hypnobabiestwincities.[employer]/  Hypnobirthing:  Http://hypnobirthing.com/  The Birth Hour: https://Energy [employer]/online-childbirth-class/    APPS and Podcasts:   Ace Baxter Nurture    Evidence Based Birth  The Birth Hour (for birth stories)   Birthful   Expectful   The Longest Shortest Time  PregnancyPodcast Nehal Shankar    Book Recommendations:   Peyton Papito's Birthing From Within--first few chapters include a new-age tone, you may prefer to skip it and keep going, because there is good stuff later.  This book recommendation covers emotional preparation, but does cover coping with pain, and use of both pharmacological and nonpharmacological methods.    Dr. Garza' The Pregnancy Book and The Birth Book--the pregnancy book goes month-by month      The Birth Partner by Maru Brunson    Womanly Art of Breastfeeding by La Leche League International   Bestfeeding by Karen Bailey--great pictures    Mothering Your Nursing Toddler, by Giulia Gomez.   Addresses dealing with so many of the challenging behaviors of a nursing toddler.  How Weaning Happens, by La Leche League.  Discusses weaning at all ages, from medically necessary weaning of an infant, all the way up to age 5 (or older), with why/why not, and strategies.  Very empowering book both for deciding to wean and deciding not to.    American College of Nurse-Midwives (ACNM) http://www.midwife.org/; look at the informational handouts at http://www.midwife.org/Share-With-Women     www.mymidwife.org    Mother to Baby (Medication and Herbal guidance in pregnancy):  "http://www.mothertobaby.org  Toll-Free Hotline: 696.566.5782  LactMed (Medication use while breastfeeding): http://toxnet.nlm.nih.gov/newtoxnet/lactmed.htm    Women's Health.gov:  http://www.womenshealth.gov/a-z-topics/index.html    American pregnancy association - http://americanpregnancy.org    Centering Pregnancy (group prenatal care option): http://centeringhealthcare.org    Information about doulas:  Childbirth collective: http://www.childbirthcollective.org/  Doulas [facility] [place] (JONAS):  www.jonas.org  [facility]  project: http://BayPacketstiesdoulaproject.A[employer]is 3D Inspection/     Early Childhood and Family Education (ECFE):  ECFE offers parents hands-on learning experiences that will nourish a lifetime of teachable moments.  http://ecfe.info/ecfe-home/    March of Dimes www.Ticketfly     FDA - Nutrition  www.mypyramid.gov  Under \"For Consumers,\" click on \"pregnant and breastfeeding women.\"      Centers for Disease Control and Prevention (CDC) - Vaccines : http://www.cdc.gov/vaccines/       When researching information on the web, question the validity of websites.  The boaconsulta.com .gov, .edu and.org tend to be more reliable information.  If there are a lot of advertisements, be cautious of the information provided. Stay away from blogs and chat rooms please!     formerly [facility] Breastfeeding Support    Early Childhood Family Education [place] [address] provides a free, drop-in class/breastfeeding support group, facilitated by a lactation consultant and .  During the group you can connect with other parents, weigh your baby, ask questions about feeding and baby development, and more.  You do not need to register.  Fall in-person meetings will begin on September 12, are for parents of babies from birth to 9 months, and will meet on Monday evenings from 6 - 7:15 pm in [facility], which is at [address] [place] [address].  [place] [address] also offers virtual group meetings with a lactation " "consultant/.  These take place on , from 11:30 am - 12:30 pm for parents of newborns to 3-month-olds, and from 4:15 - 5:15 for parents of 3 - 9 - month olds.  To get the meeting link contact Nighat Trujillo at [phone].    [facility] offers a free, drop-in breastfeeding support group facilitated by ZENOBIA Russ.   at [facility] [address], [place].  A scale is available to check baby weights, if desired.  [facility] also has a variety of new parent classes:  (cost for registration)  https://Groxis/classes/    [facility] Lactation Lounge facilitated by ZENOBIA Charles:  Free, drop-in support group for breastfeeding, with baby scale available if needed.  Meets at [facility], second Tuesday of each month, 10 am - 12 pm.  Text [phone] for info.    [facility] Support Group,  at 10:30 am   Run by ZENOBIA Aviles of [facility]   Go to [facility] Facebook page, click on \"events\" for link:   Https://www.facebook.com/events/648359555233915/    [facility] breastfeeding support community:  free, drop-in breastfeeding support facilitated by Certified Lactation Counselors, open  and  from 1 pm - 5 pm.  In [place]:  Guiding [facility], [facility]:   guidingRiverside Shore Memorial Hospitalkota.org    [facility] Milk Hour,  at 2:30 pm    Run by ZENOBIA Gorman  Go to [facility] FB page and send message to get link   Https://www.Video Blocks.com/healthfoundations/    Akira Peña:  http://www.monachristal.org/    [facility] offers a Lactation Lounge every Friday 12pm - 1pm, run by Akira Hernandez Leader.  Sign up via link at Booking Angel/cbe-lactation   https://www.Booking Angel/cbe-lactation    [facility] is offering virtual support groups every Monday, 10:30 am - 12 " "pm, run by RN IBCLC: Https://www.facebook.com/events/813816965606887/    Culturally-Specific Breastfeeding Support:     For Hmong Families:   The Hmong Breastfeeding Coalition is a wonderful support for [place] Hmong women who are breastfeeding.  They are best found on Facebook.    for Black families:    Chocolate Milk Club:  http://www.PhotoThera.[employer]/chocolate-milk-club/  Email: Andrea@GlobaTrek    R.O.S.E. = Reaching our Sisters Everywhere  Http://www.breastfeedingrose.org/    Club Mom breastfeeding support for Black mothers:  Contact Geraldo Wyatt  Phone: [phone]   Email:  [email or web address].     Zeenat Warner  Phone: [phone]   Email:  [email or web address]@[email or web address] Memorial Medical Center    Club Dad parent support for Black fathers:   Contact Marques Oneill   Phone: [phone]   Email:  Aida@[facility]    For Native/Indigenous Families:    https://www.Buyapowa.com/groups/nitamising.gimashkikinaan   Virtual Breastfeeding Support:    During this time of isolation, breastfeeding families need even more community!  Here are some area organizations offering virtual support groups for breastfeeding:    [facility] Cafe Support Group, Tuesdays at 10:30 am   Run by ZENOBIA Aviles of [facility]   Go to [facility] Facebook page and click on \"events\" for link   https://www.Buyapowa.com/events/863841295490969/  [facility] Milk Hour, Thursdays at 2:30 pm    Run by ZENOBIA Gorman   Go to [facility] FB page and send message to get link   https://www.Buyapowa.com/healthfoundations/  [facility]/Spring holding virtual meetings the first Tuesday of each month, 8-9 pm, and the   Third Saturday, 10 - 11 am.  Go to [facility] FB page; message to get link " https://www.9DIAMOND.com/LLLofGoldenValley/?hc_location=i  [employer] offers a Lactation Lounge every Friday 12pm - 1pm, run by Akira Hernandez Leader   Sign up via link at [employer]./cbe-lactation   https://www.Durect Corp./cbe-lactation  [facility] is offering virtual support groups every Monday, 10:30 am - 12 pm, run by nurse ZENOBIA   Https://www.facebook.com/events/652088478012107/    Prenatal Breastfeeding Classes:      [employer] is offering virtual breastfeeding and  care classes:  https://www.Durect Corp./education-workshops  BirthEd childbirth and breastfeeding education offering virtual prenatal breastfeeding classes  https://www.birthedmn.com/workshops

## 2022-09-30 NOTE — PROGRESS NOTES
Sudarshan presents with Fidel for a routine PNV at 36w 0d.  Feeling irritated with work s/t inability to meet high demands as only one person, otherwise experiencing normal end of pregnancy discomforts.  Discussed:  1.)  Growth US s/p Covid infection in August, agreeable, ordered with AFV and BPP.  2.)  Reviewed maternal growth chart. S/t Excessive weight gain during pregnancy advised limiting sugars and simple carbohydrates as much as possible, smaller meal portions, and healthy snacking throughout day.  Verbalizes understanding.    Fetus ANA PAULA by Leopold's Maneuvers and confirmed with bedside ultrasound.    OP or other malposition Risks include nulliparity, BMI > 30, potential fetal macrosomia (EFW 75% at 22w), and Anterior Placenta.  Reviewed maternal postures and exercises to encourage optimal fetal positioning until labor begins.    Declines SVE.    GBS and Hgb today.    RTC 1 week, sooner as needed.  Has CNM numbers and aware to call with DFM, LOF, PTL or any questions or concerns.    Patient with somewhat flat affect and minimal eye contact at beginning of visit, smiling and good eye contact by end of visit.    MARVIN House CNM, CAROLE, CLC  9/30/2022 1:32 PM

## 2022-10-02 LAB — GP B STREP DNA SPEC QL NAA+PROBE: NEGATIVE

## 2022-10-07 ENCOUNTER — PRENATAL OFFICE VISIT (OUTPATIENT)
Dept: MIDWIFE SERVICES | Facility: CLINIC | Age: 22
End: 2022-10-07
Payer: COMMERCIAL

## 2022-10-07 VITALS
BODY MASS INDEX: 41.85 KG/M2 | WEIGHT: 236.2 LBS | HEIGHT: 63 IN | DIASTOLIC BLOOD PRESSURE: 66 MMHG | SYSTOLIC BLOOD PRESSURE: 114 MMHG | HEART RATE: 80 BPM

## 2022-10-07 DIAGNOSIS — Z34.00 SUPERVISION OF NORMAL FIRST PREGNANCY, ANTEPARTUM: ICD-10-CM

## 2022-10-07 DIAGNOSIS — O98.513 COVID-19 AFFECTING PREGNANCY IN THIRD TRIMESTER: ICD-10-CM

## 2022-10-07 DIAGNOSIS — U07.1 COVID-19 AFFECTING PREGNANCY IN THIRD TRIMESTER: ICD-10-CM

## 2022-10-07 PROCEDURE — 99207 PR PRENATAL VISIT: CPT | Performed by: ADVANCED PRACTICE MIDWIFE

## 2022-10-07 RX ORDER — LANOLIN ALCOHOL/MO/W.PET/CERES
CREAM (GRAM) TOPICAL
COMMUNITY
Start: 2022-09-30 | End: 2022-10-21

## 2022-10-07 RX ORDER — MULTIVIT WITH MINERALS/LUTEIN
TABLET ORAL
COMMUNITY
Start: 2022-10-03 | End: 2022-10-21

## 2022-10-07 NOTE — PROGRESS NOTES
Here alone today for routine prenatal visit at 37w0d. Reports feeling end of pregnancy discomforts. Notes active FM and recent regular UCs. Notes three days ago notes more pelvic pressure, contractions resolved with walking, eating and drinking lemonade. Two nights ago into afternoon had regular contractions but hydrating well and resolved without intervention. Reviewed normal labs from last visit. Reviewed today results of ultrasound, BPP 8/8, growth > 90%ile, cephalic, fluid wnl. Wondering about baby's size and safety or timing of birth if baby is large. Discussed parameters of change in birth plan if > 5000g, measures to encouraged readiness for labor to avoid post-dates pregnancy, and advantages to her planned unmedicated birth with assisting optimal fetal position and decrease birth complications. Advised CNM will be aware of any risk factors related to her pregnancy at the time of her birth to assist with a safe journey. Reviewed her birth plan for unmedicated birth, waterbirth, and nitrous oxide. Discussed her Postpartum supports, planning 8-12 weeks maternity leave and will bring paperwork to next visit. Fidel will be home and supportive but they do not live together. She lives with her sister and niece. Shares that she and her sister cared for her mother and her youngest sibling when her mother was experiencing the effects of postpartum anxiety and depression with previously diagnosed schizophrenia. Her mother has since passed but shares that she developed skills during that experience she feels will be helpful. Continues to be busy with work 5-6 days a week, readying for baby's arrival. Hopeful for soon and wondering about red raspberry leaf tea; offered safe at this time. Encouraged self care and sleep. Advised on upcoming labs and visit schedule. Advised on recommendation for flu vaccine in pregnancy; elects to decline due to maternal reaction to flu and caution to avoid. Reviewed logistics when in  labor and to call [facility] oncall, confirmed contact information. Reviewed warning s/sx and reasons to call. RTC 1 weeks.

## 2022-10-07 NOTE — PATIENT INSTRUCTIONS
"[facility] Nurse Midwives - Contact information:  Appointment line and to get a hold of CNM in clinic Monday-Friday 8 am - 5 pm:  [phone].  There are some clinics with early start times (1st appointment 7:40 am) and others with evening hours (last appointment 6:20 pm).  Most are typically open from 8 am to 5 pm.    CNM on call answering service: [phone].  Specify your hospital of choice and leave a brief message for CNM;  will then page CNM who is on call at your specified hospital and you should receive a call back with 15 minutes.  Be sure that your ringer is audible and that you can accept blocked calls so that we can get back in touch with you! This number should be reserved for urgent needs if during the day, before 8 am, after 5 pm, weekends, holidays.    Contact the on-call CNM with warning signs, such as:  vaginal bleeding   Vaginal discharge and itching or pain and burning during urination  Leg/calf pain or swelling on one side  severe abdominal pain  nausea and vomiting more than 4-5 times a day, or if you are unable to keep anything down  fever more than 100.4 degrees F.     CRAVEhart  After each of your visits you are welcome to check [employer] for your visit summary including education and links to information relevant to your pregnancy and/or well woman care.   Find the \"Visits\" tab at the top of the page, you will see a list of recent visits and for each visit a for link for \"View After Visit Summary.\" View of your After Visit Summary will allow you to read our recommendations from your visit, review any education provided, and link to websites with useful information.   If you have any questions or difficulty navigating [employer], please feel free to contact us and we will do our best to direct you.    Meet the Midwives from [facility]  You are invited to an informational meet and greet with [facility] Certified Nurse-Midwives. Our free \"Meet the " "Midwives\" event is a great opportunity to learn about our midwives' philosophy and experience, the hospitals where we can assist with your birth, and answer questions you may have. Partners, friends, and family are welcome to attend. Currently, this is a virtual event.  Dates: First Tuesday of every month at 7 pm.    Link to next (live) meeting  [email or web address]/classes-[email or web address]/meet-the-midwives-from-[facility]  To Join by Telephone (audio only) Call:   [phone] Phone Conference ID: [phone]#      Touring the Maternity Care Center  At this time we are offering a virtual tour of the Maternity Care Centers at both [facility] and [facility]:   [facility] Maternity Care:   https://[facility].org/locations/the-birthplace-[facility] Maternity Care:   https://"Mosec, Mobile Secretary"/locations/[facility]      Childbirth and Parenting Education:     Everyday Miracles:   https://www.everyday-miracles.org/    Free Video Series from [facility]: https://nursing.[facility]/academics/specialty-areas/nurse-midwifery/having-baby-prenatal-videos/having-baby-prenatal-and    LURDES parenting center: http://[facility] HospitalSaylent Technologies.Nuxeo/   ([phone] 224-[phone]  Blooma: (education, yoga & wellness) www.Queralt.Nuxeo  Enlightened Mama: www.enlightenedmama.com   Childbirth collective: (Parent topic nights)  www.childbirthcollective.org/  Hypnobabies:  www.hypnobabiestAmerican Apparelcities.com/  Hypnobirthing:  Http://hypnobirthing.com/  The Birth Hour: https://thebirthhouSilico Corp.Nuxeo/online-childbirth-class/    APPS and Podcasts:   Ace Baxter Nurture    Evidence Based Birth  The Birth Hour (for birth stories)   Birthful   Expectful   The Longest Shortest Time  PregnancyPodcast Nehal Shankar    Book Recommendations:   Peyton Granger's Birthing From Within--first few chapters include a new-age tone, you may prefer to skip it and " "keep going, because there is good stuff later.  This book recommendation covers emotional preparation, but does cover coping with pain, and use of both pharmacological and nonpharmacological methods.    Dr. Garza' The Pregnancy Book and The Birth Book--the pregnancy book goes month-by month      The Birth Partner by Maru Kimbrough    Womanly Art of Breastfeeding by La Leche League International   Breastfeeding by Karen Bailey--great pictures    Mothering Your Nursing Toddler, by Giulia Gomez.   Addresses dealing with so many of the challenging behaviors of a nursing toddler.  How Weaning Happens, by La Leche League.  Discusses weaning at all ages, from medically necessary weaning of an infant, all the way up to age 5 (or older), with why/why not, and strategies.  Very empowering book both for deciding to wean and deciding not to.    American College of Nurse-Midwives (ACNM) http://www.midwife.org/; look at the informational handouts at http://www.midwife.org/Share-With-Women     www.mymidwife.org    Mother to Baby (Medication and Herbal guidance in pregnancy): http://www.mothertobaby.org  Toll-Free Hotline: 620.521.7305  LactMed (Medication use while breastfeeding): http://toxnet.nlm.nih.gov/newtoxnet/lactmed.htm    Women's Health.gov:  http://www.womenshealth.gov/a-z-topics/index.html    American pregnancy association - http://americanpregnancy.org    Centering Pregnancy (group prenatal care option): http://centeringhealthcare.org    Information about doulas:  Childbirth collective: http://www.childbirthcollective.org/  Doulas of [place] (JONAS):  www.jonas.org  [facility]  project: http://twincitiesdoulaproject.com/     Early Childhood and Family Education (ECFE):  ECFE offers parents hands-on learning experiences that will nourish a lifetime of teachable moments.  http://ecfe.info/ecfe-home/    March of Dimes www.Histogenics - Nutrition  www.mypyramid.gov  Under \"For Consumers,\" " "click on \"pregnant and breastfeeding women.\"      Centers for Disease Control and Prevention (CDC) - Vaccines : http://www.cdc.gov/vaccines/       When researching information on the web, question the validity of websites.  The Nuka Indstries .gov, .edu and.org tend to be more reliable information.  If there are a lot of advertisements, be cautious of the information provided. Stay away from blogs and chat rooms please!     Making Plans for Feeding My Baby    By this point, you probably have read a lot about feeding your baby.  Breastfeed or formula? Each parent's decision is their own and [facility] Nurse Midwives [facility] respects you and your choices. We ve gathered information on both breastfeeding and formula feeding to help with your decision. Talking with your nurse-midwife can also help in your decision.  However you plan to feed your baby, [facility] Maternity Care [facility] encourage rooming in with your baby, skin-to-skin contact and feeding your baby based on his or her cues.    Skin-to-skin contact  Being close to mom helps your baby adjust to life outside of the womb.  It helps your baby regulate their body temperature, heart rate, and breathing.  Your baby will usually be placed skin-to-skin immediately following birth or as soon as possible, if medical intervention is needed.    Rooming-In  Having your baby stay with you in your room is called  rooming-in .  Keeping your baby in your room helps you to learn how to care for your baby by getting to know your baby s cues, body rhythms and sleep cycle.       Cue-based feeding  Cues (signals) are baby s way of telling you what he or she wants.  When you learn your infant s cues, you know how to care for and feed your baby.   Feeding cues are the licking and smacking of lips, bringing their fist to their mouth, and a reflex called  rooting - where baby turns and opens his or her mouth, searching for the breast or bottle.  Crying is a late feeding " cue.  Babies can feed frequently, often at least 8 times in 24 hours.    Breastfeeding facts  Breast milk is the best source of nutrition for your baby and is available at birth. In the first couple of days, your milk volume is already starting to increase, though it may not be noticeable. Breastfeed frequently to increase your milk supply. Within three to five days, you will begin to notice larger milk volumes. An increase in breast size, heaviness and firmness are often described as the milk  coming in.  Frequent breastfeeding can help breasts from getting overly firm and painful. You will know the baby is getting enough milk if your baby is having wet and dirty diapers and gaining weight.     If your goal is to exclusively breastfeed, it is important to not use any formula or artificial nipples (including bottles and pacifiers) while your baby is learning to breastfeed.  While it may seem like an  easy  option to give your baby a bottle, formula should only be given if there is a medical reason for your baby to have it.      Positioning and attachment   Get comfortable.  Use pillows as needed to support your arms and baby.  Hold baby close at the level of your breast, facing you in a tummy to tummy position.  Skin to skin helps with this.  Position the baby with his or her nose by the nipple.  There should be a straight line from baby s ear to shoulder to hips.  Tickle your baby s lips or wait for baby to open mouth wide, bring baby to breast by leading with the chin.  Aim the nipple at the roof of baby s mouth.  A rapid sucking pattern is followed by longer, drawing pattern with occasional swallows heard.  When baby is correctly latched, your nipple and much of the areola are pulled well into baby s mouth.      Returning to work or school  Focus on a good start to breastfeeding.  Many women continue to provide breastmilk for their baby when they return to work or school.  Making plans about where to pump and  store milk can make the transition go well.  Talk with other mothers who have also returned to work or school for tips and support.  Your employer s Human Resource department may be a resource as well.     Returning to work or school: (continued)   babies can mean fewer  sick  days for you.  A quality breast pump will also save time and add comfort.  Check with your insurance prior to giving birth for breast pump coverage.  Many insurance companies include a pump within your benefits.  Wait until your baby is at least three weeks old to introduce a bottle for the first time.  Have someone besides you give the bottle.  Breastfeed when you are with your baby. Reserve your bottles of breast milk for when you are away.   Your breasts will need to be  emptied  either by your baby or a pump.  Plan to pump at least twice in an eight hour day.  If you cannot pump at work, continue breastfeeding at home. Any amount of breast milk is worth giving to your baby.    Formula feeding facts  If you are planning to use formula to feed your baby, you will want to make some preparations ahead of time. Talk to your doctor or nurse-midwife about what type of formula to use. Some are iron-fortified, meaning they have extra iron in them. You will want to purchase formula and bottles before your baby is born to be sure you are ready after you return from the hospital. The [facility] do not provide formula samples to take home.    Be sure to follow formula mixing directions closely. Regular milk in the dairy case at the grocery store should not be given to babies under 1 year old. Baby formula is sold in several forms including:  Ready-to-use. This is the most expensive, but no mixing is necessary.  Concentrated liquid. This is less expensive than ready-to-use and you mix with water.  Powder. This is the least expensive. You mix one level scoop of powdered formula with two ounces of water and stir well.    Most babies  need 2.5 ounces of formula per pound of body weight each day. This means an 8-pound baby may drink about 20 ounces of formula a day; however, this is just an estimate. The most important thing is to pay attention to your baby s cues.  If your baby is always fussy, needs more iron or has certain food allergies, your physician may suggest you change your baby s formula to a different kind.     How do I warm my baby s bottles?  You may feed your baby a bottle without warming it first. It is OK for the breast milk or formula to be cool or room temperature. If your baby seems to prefer it warmed, you can put the filled bottle in a container of warm water and let it stand for a few minutes. Check the temperature of the liquid on your skin before feeding it to your baby; to be sure it isn t too hot. Do not heat bottles in the microwave. Microwaves heat food and liquids unevenly, and this can cause hot spots that can burn your baby.    How do I clean and sterilize bottles?  Sterilize bottles and nipples before you use them for the first time. You can do this by putting them in boiling water for 5 minutes. After that first time, you can wash them in hot and soapy water. Rinse them carefully to be sure there is no soap left on them. You can also wash them in the .    Breastfeeding/Chestfeeding Support  Contact us  Breastfeeding/chestfeeding is the natural and healthy way for parents to feed their babies and provides the best source of nutrition in most cases to infants. Breast milk has health benefits for mom, too. The American Academy of Pediatrics recommends exclusively breastfeeding for 6 months for optimal growth and development and breastfeeding up to at least a year.  Benefits to baby:  Easy digestion, less diarrhea and constipation, breast milk is easy to digest.  Lots of bonding with parent.  Less likely to have asthma.  Less ear infections and respiratory infections.  Less likely to have Type 2  Diabetes.  Less likely to become obese.  Lower risk of Sudden Infant Death Syndrome (SIDS).  Benefits to breastfeeding/chestfeeding parent:  Helps with weight loss.  Lower risk of ovarian and breast cancer, Type 2 diabetes and heart disease.  /chestfed babies are easy to take on trips. Just grab the diapers and go!  Breastfeeding/chestfeeding saves money (no formula or bottle costs, fewer doctor bills and medication costs).  Ready to breastfeed/chestfeed anywhere, don t need to make and wash bottles.  Breastfeeding/chestfeeding is wonderful, but not always easy. Learning what to expect ahead of time and get support from a lactation professional. Check out the [facility] Breastfeeding Resource Guide for classes, drop in support groups, childbirth education, Doulas and clinic and hospital lactation services.     [facility] Baby Café  Due to COVID-19, all Baby Café sessions are canceled until further notice. For lactation support, please contact one of our bilingual staff:  Dayanara (IBCLC) [phone]  Maame (IBCLC/ Emirati) [phone]  Leoncio (Hmong) [phone]  Yfn (Citizen of [place]) [phone]  Baby Café is a free, drop-in service offering breastfeeding/chestfeeding support. Come share tips and socialize with other pregnant, breastfeeding/chestfeeding families. Babies and siblings are welcome (no  available).  We offer:  Professionally trained lactation staff.  Resource books for lending.  Relaxed and fun atmosphere.  Refreshments.  Locations  Baby Café is offered at several locations.  Please see below for the Baby Café closest to you.  CANCELED UNTIL FURTHER NOTICE  [facility]  [address], [place] [facility]  1st Wednesdays of the month   10 a.m. - Noon  CANCELED UNTIL FURTHER NOTICE  [facility] [address], [place], [facility]  4th Wednesdays of the month   10 a.m. - 12:30 p.m.     CANCELED UNTIL FURTHER NOTICE  [facility]  [address], [place], [facility]   of the month  4 - 6 p.m.  CANCELED UNTIL FURTHER NOTICE  [facility] [address], [place], [facility]   of the month.  9:30-11:30 a.m.  Enter through the east end of the building, the blue Door C.  Ring the ECFE buzzer to be let in.   More information  Maame Linares  [phone]  [email or web address].         Virtual Breastfeeding Support:    During this time of isolation, breastfeeding families need even more community!  Here are some area organizations offering virtual support groups for breastfeeding:    Lat Cafe Support Group,  at 10:30 am   Run by ZENOBIA Aviles of [employer] [facility] [facility]   Go to [employer] [facility] [facility] Facebook page and click on \"events\" for link   https://www.immoture.be.com/events/619633375024760/  [facility] Milk Hour,  at 2:30 pm    Run by ZENOBIA Gorman   Go to [facility] FB page and send message to get link   https://www.immoture.be.DVS Sciences/healthfoundations/  [facility] holding virtual meetings the first Tuesday of each month, 8-9 pm, and the   Third Saturday, 10 - 11 am.  Go to [facility] [place] FB page; message to get link [email or web address]://www.[email or web address].be.DVS Sciences/Kelly/?hc_location=[facility]  [facility] offers a Lactation Lounge every Friday 12pm - 1pm, run by Akira Hernandez Leader   Sign up via link at PodTech/cbe-lactation   https://www.PodTech/cbe-lactation  [facility] is offering virtual support groups every Monday, 10:30 am - 12 pm, run by nurse IBCLC   Https://www.immoture.be.com/events/[email or web address]/    Prenatal Breastfeeding Classes:      [facility] is offering virtual breastfeeding and  care classes:  https://www.PodTech/education-workshops  BirthEd childbirth and breastfeeding " education offering virtual prenatal breastfeeding classes  Https://www.linkedFA.StartWire/workshops      Preparing for your baby:   [place] Screening Program  Http://www.health.[facility].mn.us/newbornscreening/  [place] newborns are tested soon after birth for more than 50 hidden, rare disorders, including hearing loss and critical congenital heart disease (CCHD). This site provides resources and information for families and providers.    When to call:   Appointment line and to get a hold of CNM in clinic Monday-Friday 8 am - 5 pm:  [phone].  There are some clinics with early start times (1st appointment 7:40 am) and others with evening hours (last appointment 6:20 pm).  Most are typically open from 8 am to 5 pm.    CNM on call answering service: [phone].  Specify your hospital of choice and leave a brief message for CNM;  will then page CNM who is on call at your specified hospital and you should receive a call back with 15 minutes.  Be sure that your ringer is audible and that you can accept blocked calls so that we can get back in touch with you! This number should be reserved for urgent needs if during the day, before 8 am, after 5 pm, weekends, holidays.    Contact the on-call CNM with warning signs, such as:  vaginal bleeding   Vaginal discharge and itching or pain and burning during urination  Leg/calf pain or swelling on one side  severe abdominal pain  nausea and vomiting more than 4-5 times a day, or if you are unable to keep anything down  fever more than 100.4 degrees F.     Make plans for transportation and  as needed for when you are going to the hospital.    Ask your health care provider about vaccinations you may need following delivery. By now, you should have received a Tdap immunization to protect against pertussis or whooping cough. Fathers and family members who will be in close contact with the baby should also receive a Tdap shot at least two weeks before the expected  birth of the baby if they have not had a Td (tetanus) shot for at least two years.    Your midwife may offer to check your cervix for changes. If you are past your due date, discuss the next steps leading to delivery with your midwife. If you don't start labor on your own by 41 or 42 weeks, your midwife may recommend giving you medicines to ripen your cervix and start labor.  Induction of labor: http://onlinelibrary.gabriel.com/store/10.1016/j.jmwh.2008.04.018/asset/j.jmwh.2008.04.018.pdf?v=1&t=bxjl2gqw&p=[email or web address]    Tell your midwife or physician how you plan to feed your baby (breast or bottle), who you have chosen to do pediatric care for your baby, and if you have a boy, whether you have chosen to have him circumcised. You will need a car seat correctly installed in your vehicle to bring your baby home. As you start to set up the nursery at home for your baby, make sure the crib is safe. The mattress needs to fit snugly against the edges of the crib. If you can fit a soda can between the bars, they are too far apart and can allow the baby's head to caught between them.    Learn about infant care and feeding, including information about infant CPR. We recommend that you put your baby to sleep on his or her back to reduce the chance of Sudden Infant Death Syndrome (SIDS). To maintain a healthy environment in which your child can grow, it's best to keep your home smoke-free. By preparing ahead, your transition into parenthood will go smoothly for you and your baby.    Your midwife will want to see you for a checkup 2 to 6 weeks after delivery.

## 2022-10-10 ENCOUNTER — PATIENT OUTREACH (OUTPATIENT)
Dept: CARE COORDINATION | Facility: CLINIC | Age: 22
End: 2022-10-10

## 2022-10-10 NOTE — PROGRESS NOTES
Clinic Care Coordination Contact    Community Health Worker Follow Up    Care Gaps:     Health Maintenance Due   Topic Date Due     YEARLY PREVENTIVE VISIT  Never done     ADVANCE CARE PLANNING  Never done     HEPATITIS B IMMUNIZATION (3 of 3 - 3-dose series) 08/25/2015     HPV IMMUNIZATION (3 - 3-dose series) 06/09/2016     COVID-19 Vaccine (3 - Booster for Pfizer series) 08/21/2021     MATERNAL SCREENING  Never done     REPEAT ANTIBODY SCREEN (OB)  08/05/2022     INFLUENZA VACCINE (1) Never done       Care Plan: all goals have been completed.    Intervention and Education during outreach: CHW will route patients chart to lead CCC SW to review for graduation. CHW gave patient contact information and encouraged patient to reach out with any questions or concerns.    CHW Note:    CHW contacted patient regarding CCC maintenance outreach.    Patient shared that she has been doing really good and denied any other needs or concerns.     CHW discussed the patient graduating from [facility]. CHW informed patient that a referral can be placed for CCC at anytime and encouraged patient to reach out if she has any additional needs or concerns that come up in the future. Patient was agreeable to graduate from [facility] and to reach out if she needs additional assistance.    CHW will route patients chart to lead CCC SW Stephanie to review for graduation.    CHW Plan: CHW will route patients chart to lead CCC SW to review for graduation. CHW will preform chart review in 2 months on 12/12/22.      Marilyn Price  Community Health Worker   [facility] Care Coordination  [facility]   [email or web address]  Office: [phone]

## 2022-10-11 ENCOUNTER — PATIENT OUTREACH (OUTPATIENT)
Dept: CARE COORDINATION | Facility: CLINIC | Age: 22
End: 2022-10-11

## 2022-10-11 NOTE — LETTER
[facility] CARE COORDINATION  [facility]    October 11, 2022    Sudarshan Fierro  [address]  [place] [address]    Dear Sudarshan,  Your Care Team congratulates you on your journey to maintain wellness. This document will help guide you on your journey to maintain a healthy lifestyle.  You can use this to help you overcome any barriers you may encounter.  If you should have any questions or concerns, you can contact the members of your Care Team or contact your Primary Care Clinic for assistance.     Health Maintenance  Health Maintenance Reviewed:      My Access Plan  Medical Emergency 911   Primary Clinic Line [phone]   24 Hour Appointment Line [phone] or  [phone]) (toll-free)   24 Hour Nurse Line [phone] (toll-free)   Preferred Urgent Care     Preferred Hospital     Preferred Pharmacy [id number] - [place], [place] - [address] AT [facility] [address]     Behavioral Health Crisis Line The National Suicide Prevention Lifeline at 1-555.456.4321 or 911     My Care Team Members  Patient Care Team       Relationship Specialty Notifications Start End    No Ref-Primary, Physician PCP - General   12/19/18     Fax: [phone]         Poornima Hooker MD MD Family Medicine - Sports Medicine  12/19/18     Phone: [phone] Fax: [phone]         [address] [facility] [address]    Re Nayak CNM Assigned OBGYN Provider   3/13/22     Phone: [phone] Fax: [phone]         [address] [facility] [address]    Stephanie Ahuja LSW Lead Care Coordinator Primary Care - CC Admissions 7/6/22     Phone: [phone]         Marilyn Price MA Community Health Worker  Admissions 7/6/22                Goals        Patient Stated       COMPLETED: Financial Wellbeing (pt-stated)       Goal Statement: I will apply for Health Insurance within the next 1-2 weeks if I am eligible.    Date Goal Set:  July 6, 2022  Strengths: motivated.   Barriers:  trouble with website.   Date to Achieve By: 8-6-2022  Patient expressed understanding of goal: yes  Action steps to achieve this goal:  1. I understand a referral was placed to the Financial Resource Worker, I will receive a call within the next 3 business days.    2. I understand the financial worker will make two attempts to call me. If I still need help with this goal, I will connect with my Community Health Worker Marilyn at [phone].                    It has been your Clinic Care Team's pleasure to work with you on your goals.    Regards,  Your Clinic Care Team

## 2022-10-11 NOTE — PROGRESS NOTES
Clinic Care Coordination Contact    Assessment: Care Coordinator contacted patient for 2 month follow up.  Patient has continued to follow the plan of care and assessment is negative for any new needs or concerns.    Enrollment status: Graduated.      Plan: No further outreaches at this time.  Patient will continue to follow the plan of care.  If new needs arise a new Care Coordination referral may be placed.  FYI to PCP    Stephanie Ahuja,   [facility]  [phone]

## 2022-10-14 ENCOUNTER — PRENATAL OFFICE VISIT (OUTPATIENT)
Dept: MIDWIFE SERVICES | Facility: CLINIC | Age: 22
End: 2022-10-14
Payer: COMMERCIAL

## 2022-10-14 VITALS
SYSTOLIC BLOOD PRESSURE: 124 MMHG | DIASTOLIC BLOOD PRESSURE: 82 MMHG | BODY MASS INDEX: 42.18 KG/M2 | HEART RATE: 72 BPM | WEIGHT: 240 LBS

## 2022-10-14 DIAGNOSIS — Z91.89 AT RISK FOR COMPLICATION OF PREGNANCY: ICD-10-CM

## 2022-10-14 DIAGNOSIS — Z34.00 SUPERVISION OF NORMAL FIRST PREGNANCY, ANTEPARTUM: Primary | ICD-10-CM

## 2022-10-14 DIAGNOSIS — O26.00 EXCESSIVE WEIGHT GAIN AFFECTING PREGNANCY: ICD-10-CM

## 2022-10-14 PROCEDURE — 99207 PR PRENATAL VISIT: CPT | Performed by: ADVANCED PRACTICE MIDWIFE

## 2022-10-14 NOTE — PROGRESS NOTES
Octavio presents with Fidel.  Experiencing some intermittent low back pain and wants SVE.  Baby is active, and she denies regular uterine contractions, loss of fluid or vaginal bleeding.  Slight headache this morning, frontal.  20 ounces of water so far today.  Recommend increasing p.o. hydration, eating lunch and taking acetaminophen.  Contact on-call CNM if headache does not resolve.  BP normotensive today.  Patient is at an increased risk for developing preeclampsia.  GBS NEGATIVE and 36-week hemoglobin 11.5 g/dL.  Term labor precautions and danger signs and symptoms reviewed.  Written information given regarding post due date,  screen and  hearing screen.  All questions answered.  Encouraged daily fetal movement counting and to call or return to clinic with any questions, concerns, or as needed.

## 2022-10-21 ENCOUNTER — PRENATAL OFFICE VISIT (OUTPATIENT)
Dept: MIDWIFE SERVICES | Facility: CLINIC | Age: 22
End: 2022-10-21
Payer: COMMERCIAL

## 2022-10-21 VITALS
SYSTOLIC BLOOD PRESSURE: 116 MMHG | HEIGHT: 63 IN | HEART RATE: 84 BPM | DIASTOLIC BLOOD PRESSURE: 62 MMHG | BODY MASS INDEX: 43.05 KG/M2 | OXYGEN SATURATION: 99 % | WEIGHT: 243 LBS

## 2022-10-21 DIAGNOSIS — Z34.00 SUPERVISION OF NORMAL FIRST PREGNANCY, ANTEPARTUM: Primary | ICD-10-CM

## 2022-10-21 DIAGNOSIS — O26.00 EXCESSIVE WEIGHT GAIN AFFECTING PREGNANCY: ICD-10-CM

## 2022-10-21 DIAGNOSIS — Z91.89 AT RISK FOR COMPLICATION OF PREGNANCY: ICD-10-CM

## 2022-10-21 PROCEDURE — 99207 PR PRENATAL VISIT: CPT | Performed by: ADVANCED PRACTICE MIDWIFE

## 2022-10-21 NOTE — PROGRESS NOTES
Octavio presents with Fidel.  They are very excited for baby's arrival!  He is active, and she denies current headache, visual disturbances, right upper quadrant abdominal pain, regular uterine contractions, loss of fluid or vaginal bleeding.  She endorses [place] Romero contractions.  Headache x1 this last week which resolved with comfort measures.  Experiencing bilateral sciatica; this writer discussed comfort measures such as heat, massage and gentle stretching demonstrated.  She is working full-time at [employer].  Discussed fetal growth ultrasound performed on 9/30/2022 estimating EFW at about 7-1/2 pounds then. This writer discussed expectant management with fetal surveillance (BPP/NST at 41+0 weeks) versus IOL for late term pregnancy at 41+0 weeks, and patient definitely feels sure about the latter and may desire elective IOL sooner if Guardado score is 8 or greater next visit during which she desires SVE (she will be 40+0 weeks).  Term labor precautions and danger signs and symptoms reviewed.  All questions answered.  Encouraged daily fetal movement counting and to call or return to clinic with any questions, concerns, or as needed.

## 2022-10-28 ENCOUNTER — PRENATAL OFFICE VISIT (OUTPATIENT)
Dept: MIDWIFE SERVICES | Facility: CLINIC | Age: 22
End: 2022-10-28
Payer: COMMERCIAL

## 2022-10-28 VITALS
BODY MASS INDEX: 43.41 KG/M2 | DIASTOLIC BLOOD PRESSURE: 68 MMHG | HEART RATE: 72 BPM | WEIGHT: 247 LBS | SYSTOLIC BLOOD PRESSURE: 122 MMHG

## 2022-10-28 DIAGNOSIS — Z34.00 SUPERVISION OF NORMAL FIRST PREGNANCY, ANTEPARTUM: Primary | ICD-10-CM

## 2022-10-28 DIAGNOSIS — O48.0 POST-TERM PREGNANCY, 40-42 WEEKS OF GESTATION: ICD-10-CM

## 2022-10-28 DIAGNOSIS — O26.00 EXCESSIVE WEIGHT GAIN AFFECTING PREGNANCY: ICD-10-CM

## 2022-10-28 PROCEDURE — 59426 ANTEPARTUM CARE ONLY: CPT | Performed by: ADVANCED PRACTICE MIDWIFE

## 2022-10-28 PROCEDURE — 99207 PR PRENATAL VISIT: CPT | Performed by: ADVANCED PRACTICE MIDWIFE

## 2022-10-28 NOTE — Clinical Note
Patient is scheduled for a postdates cervical ripening/IOL on Friday, November 4 at [facility] at 7:30 AM.

## 2022-10-28 NOTE — PROGRESS NOTES
Octavio presents to the clinic with Fidel.  She was eager to induce electively, but cervical exam is essentially unchanged from 38 weeks: Closed/thick/high, posterior and medium.  Guardado score: 1.  This writer recommends cervical ripening which cannot be scheduled until 41+0 weeks on Friday, November 4, 2022 at [facility] at 7:30 AM.  Patient knows to call the unit at 6:30 AM.  Nurse midwife Inge Mtz is on call that day and copied here.  Baby is active, and she denies headache, visual disturbances, right upper quadrant abdominal pain, regular uterine contractions, loss of fluid or vaginal bleeding.  She endorses Ryan Romero contractions.  Term labor precautions and danger signs and symptoms reviewed.  All questions answered.  Encouraged daily fetal movement counting and to call or return to clinic with any questions, or as needed.

## 2022-11-04 PROBLEM — Z37.9 NORMAL LABOR: Status: ACTIVE | Noted: 2022-11-04

## 2022-11-05 PROBLEM — O13.3 GESTATIONAL HYPERTENSION, THIRD TRIMESTER: Status: ACTIVE | Noted: 2022-11-05

## 2022-11-06 ENCOUNTER — ANESTHESIA (OUTPATIENT)
Dept: OBGYN | Facility: CLINIC | Age: 22
End: 2022-11-06
Payer: COMMERCIAL

## 2022-11-06 ENCOUNTER — ANESTHESIA EVENT (OUTPATIENT)
Dept: OBGYN | Facility: CLINIC | Age: 22
End: 2022-11-06
Payer: COMMERCIAL

## 2022-11-06 PROCEDURE — 250N000009 HC RX 250: Performed by: NURSE ANESTHETIST, CERTIFIED REGISTERED

## 2022-11-06 PROCEDURE — 250N000011 HC RX IP 250 OP 636: Performed by: ANESTHESIOLOGY

## 2022-11-06 PROCEDURE — 250N000011 HC RX IP 250 OP 636: Performed by: MIDWIFE

## 2022-11-06 PROCEDURE — 250N000009 HC RX 250: Performed by: ANESTHESIOLOGY

## 2022-11-06 PROCEDURE — 250N000011 HC RX IP 250 OP 636: Performed by: NURSE ANESTHETIST, CERTIFIED REGISTERED

## 2022-11-06 PROCEDURE — 250N000011 HC RX IP 250 OP 636: Performed by: OBSTETRICS & GYNECOLOGY

## 2022-11-06 PROCEDURE — 250N000009 HC RX 250: Performed by: MIDWIFE

## 2022-11-06 PROCEDURE — 258N000003 HC RX IP 258 OP 636: Performed by: NURSE ANESTHETIST, CERTIFIED REGISTERED

## 2022-11-06 RX ORDER — FENTANYL CITRATE 50 UG/ML
INJECTION, SOLUTION INTRAMUSCULAR; INTRAVENOUS
Status: COMPLETED | OUTPATIENT
Start: 2022-11-06 | End: 2022-11-06

## 2022-11-06 RX ORDER — BUPIVACAINE HYDROCHLORIDE 7.5 MG/ML
INJECTION, SOLUTION INTRASPINAL
Status: COMPLETED | OUTPATIENT
Start: 2022-11-06 | End: 2022-11-06

## 2022-11-06 RX ORDER — BUPIVACAINE HYDROCHLORIDE 2.5 MG/ML
INJECTION, SOLUTION EPIDURAL; INFILTRATION; INTRACAUDAL
Status: COMPLETED | OUTPATIENT
Start: 2022-11-06 | End: 2022-11-06

## 2022-11-06 RX ORDER — SODIUM CHLORIDE, SODIUM LACTATE, POTASSIUM CHLORIDE, CALCIUM CHLORIDE 600; 310; 30; 20 MG/100ML; MG/100ML; MG/100ML; MG/100ML
INJECTION, SOLUTION INTRAVENOUS CONTINUOUS PRN
Status: DISCONTINUED | OUTPATIENT
Start: 2022-11-06 | End: 2022-11-06

## 2022-11-06 RX ORDER — OXYTOCIN/0.9 % SODIUM CHLORIDE 30/500 ML
PLASTIC BAG, INJECTION (ML) INTRAVENOUS CONTINUOUS PRN
Status: DISCONTINUED | OUTPATIENT
Start: 2022-11-06 | End: 2022-11-06

## 2022-11-06 RX ORDER — ONDANSETRON 2 MG/ML
INJECTION INTRAMUSCULAR; INTRAVENOUS PRN
Status: DISCONTINUED | OUTPATIENT
Start: 2022-11-06 | End: 2022-11-06

## 2022-11-06 RX ORDER — LIDOCAINE HYDROCHLORIDE AND EPINEPHRINE 15; 5 MG/ML; UG/ML
INJECTION, SOLUTION EPIDURAL PRN
Status: DISCONTINUED | OUTPATIENT
Start: 2022-11-06 | End: 2022-11-06

## 2022-11-06 RX ORDER — MORPHINE SULFATE 1 MG/ML
INJECTION, SOLUTION EPIDURAL; INTRATHECAL; INTRAVENOUS
Status: COMPLETED | OUTPATIENT
Start: 2022-11-06 | End: 2022-11-06

## 2022-11-06 RX ADMIN — Medication 0.15 MG: at 18:41

## 2022-11-06 RX ADMIN — Medication 500 MG: at 18:43

## 2022-11-06 RX ADMIN — FENTANYL CITRATE 15 MCG: 50 INJECTION, SOLUTION INTRAMUSCULAR; INTRAVENOUS at 18:35

## 2022-11-06 RX ADMIN — TRANEXAMIC ACID 1 G: 100 INJECTION, SOLUTION INTRAVENOUS at 18:46

## 2022-11-06 RX ADMIN — METHYLERGONOVINE MALEATE 200 MCG: 0.2 INJECTION INTRAVENOUS at 18:58

## 2022-11-06 RX ADMIN — ONDANSETRON 4 MG: 2 INJECTION INTRAMUSCULAR; INTRAVENOUS at 18:50

## 2022-11-06 RX ADMIN — PHENYLEPHRINE HYDROCHLORIDE 100 MCG: 10 INJECTION INTRAVENOUS at 19:10

## 2022-11-06 RX ADMIN — BUPIVACAINE HYDROCHLORIDE 6 ML: 2.5 INJECTION, SOLUTION EPIDURAL; INFILTRATION; INTRACAUDAL at 01:30

## 2022-11-06 RX ADMIN — PHENYLEPHRINE HYDROCHLORIDE 0.3 MCG/KG/MIN: 10 INJECTION INTRAVENOUS at 18:36

## 2022-11-06 RX ADMIN — Medication 300 ML/HR: at 18:53

## 2022-11-06 RX ADMIN — BUPIVACAINE HYDROCHLORIDE IN DEXTROSE 1.5 ML: 7.5 INJECTION, SOLUTION SUBARACHNOID at 18:35

## 2022-11-06 RX ADMIN — Medication 2 G: at 18:37

## 2022-11-06 RX ADMIN — LIDOCAINE HYDROCHLORIDE,EPINEPHRINE BITARTRATE 3 ML: 15; .005 INJECTION, SOLUTION EPIDURAL; INFILTRATION; INTRACAUDAL; PERINEURAL at 01:26

## 2022-11-06 RX ADMIN — SODIUM CHLORIDE, POTASSIUM CHLORIDE, SODIUM LACTATE AND CALCIUM CHLORIDE: 600; 310; 30; 20 INJECTION, SOLUTION INTRAVENOUS at 18:28

## 2022-11-06 NOTE — ANESTHESIA PROCEDURE NOTES
"Epidural catheter Procedure Note    Pre-Procedure   Staff -        Anesthesiologist:  Britton Monsivais MD       Performed By: anesthesiologist       Location: OB       Procedure Start/Stop Times: 11/6/2022 1:15 AM and 1/31/2022 1:31 AM       Pre-Anesthestic Checklist: patient identified, IV checked, risks and benefits discussed, informed consent and monitors and equipment checked  Timeout:       Correct Patient: Yes        Correct Procedure: Yes        Correct Site: Yes        Correct Position: Yes   Procedure Documentation  Procedure: epidural catheter       Patient Position: sitting       Skin prep: Chloraprep       Local skin infiltrated with mL of 1% lidocaine.        Insertion Site: L3-4. (midline approach).       Technique: LORT saline        VALERY at 6 cm.       Needle Type: Evertaley needle       Needle Gauge: 18.        Needle Length (Inches): 3.5        Catheter: 20 G.          Catheter threaded easily.           Threaded 10 cm at skin.      Assessment/Narrative         Paresthesias: Resolved.       Test dose of 3 mL lidocaine 1.5% w/ 1:200,000 epinephrine at.         Test dose negative, 3 minutes after injection, for signs of intravascular, subdural, or intrathecal injection.       Insertion/Infusion Method: LORT saline       Aspiration negative for Heme or CSF via Epidural Catheter.    Medication(s) Administered   0.25% Bupivacaine PF (Epidural) - EPIDURAL   6 mL - 11/6/2022 1:30:00 AM  Medication Administration Time: 11/6/2022 1:15 AM      FOR [facility] ([facility] ONLY:   Pain Team Contact information: please page the Pain Team Via [employer]. Search \"Pain\". During daytime hours, please page the attending first. At night please page the resident first.    "

## 2022-11-06 NOTE — ANESTHESIA PREPROCEDURE EVALUATION
Anesthesia Pre-Procedure Evaluation    Patient: Sudarshan Fierro   MRN: [id number] : 2000        Procedure :   Cervical Ripening       Past Medical History:   Diagnosis Date     Abnormal Pap smear of cervix 2022     Chlamydia      Gonorrhea       Past Surgical History:   Procedure Laterality Date     MOUTH SURGERY        Allergies   Allergen Reactions     Sukh Torres      Social History     Tobacco Use     Smoking status: Former     Types: Cigarettes     Quit date: 2022     Years since quittin.7     Smokeless tobacco: Never   Substance Use Topics     Alcohol use: Not Currently      Wt Readings from Last 1 Encounters:   10/28/22 112 kg (247 lb)        Anesthesia Evaluation            ROS/MED HX  ENT/Pulmonary:  - neg pulmonary ROS     Neurologic:  - neg neurologic ROS     Cardiovascular:     (+) --PIH ---    METS/Exercise Tolerance:     Hematologic:  - neg hematologic  ROS     Musculoskeletal:       GI/Hepatic:  - neg GI/hepatic ROS     Renal/Genitourinary:       Endo:     (+) Obesity,     Psychiatric/Substance Use:       Infectious Disease:       Malignancy:       Other:            Physical Exam    Airway        Mallampati: II       Respiratory Devices and Support         Dental  no notable dental history         Cardiovascular   cardiovascular exam normal          Pulmonary   pulmonary exam normal                OUTSIDE LABS:  CBC:   Lab Results   Component Value Date    WBC 13.0 (H) 2022    WBC 9.5 2022    HGB 12.2 2022    HGB 12.3 2022    HCT 36.9 2022    HCT 36.8 2022     2022     2022     BMP:   Lab Results   Component Value Date    CR 0.62 2022    CR 0.65 2022     COAGS: No results found for: PTT, INR, FIBR  POC:   Lab Results   Component Value Date    HCG Positive (A) 2022     HEPATIC:   Lab Results   Component Value Date    ALT 10 2022    AST 12 2022     OTHER:   Lab  Results   Component Value Date    A1C 4.9 04/04/2022       Anesthesia Plan    ASA Status:  3   NPO Status:  NPO Appropriate    Anesthesia Type: Spinal.              Consents    Anesthesia Plan(s) and associated risks, benefits, and realistic alternatives discussed. Questions answered and patient/representative(s) expressed understanding.    - Discussed:     - Discussed with:  Patient         Postoperative Care            Comments:    Other Comments: Pt's epidural has not been working well for the past 3 hours. Checked a level with ice and no level was present.  Will plan to remove epidural and do a spinal anesthetic. Pt understands and agrees to the plan.       neg OB ROS.       JUAN FRANCIS MD

## 2022-11-07 PROBLEM — Z98.891 S/P PRIMARY LOW TRANSVERSE C-SECTION: Status: ACTIVE | Noted: 2022-11-07

## 2022-11-07 NOTE — ANESTHESIA CARE TRANSFER NOTE
Patient: Sudarshan Fierro    Procedure: Procedure(s):   SECTION  Cervical Ripening    Diagnosis: * No pre-op diagnosis entered *  Diagnosis Additional Information: No value filed.    Anesthesia Type:   Epidural     Note:    Oropharynx: oropharynx clear of all foreign objects and spontaneously breathing  Level of Consciousness: awake  Oxygen Supplementation: room air      Dentition: dentition unchanged  Vital Signs Stable: post-procedure vital signs reviewed and stable  Report to RN Given: handoff report given  Patient transferred to: Labor and Delivery    Handoff Report: Identifed the Patient, Identified the Reponsible Provider, Reviewed the pertinent medical history, Discussed the surgical course, Reviewed Intra-OP anesthesia mangement and issues during anesthesia, Set expectations for post-procedure period and Allowed opportunity for questions and acknowledgement of understanding      Vitals:  Vitals Value Taken Time   /60 22   Temp 37.8  C (100  F) 22   Pulse 88 22   Resp 18 22   SpO2 99 % 22       Electronically Signed By: MARVIN Haas CRNA  2022  8:09 PM

## 2022-11-07 NOTE — ANESTHESIA POSTPROCEDURE EVALUATION
Patient: Sudarshan Fierro    Procedure: Procedure(s):   SECTION  Cervical Ripening    Anesthesia Type:  Epidural    Note:  Disposition: Inpatient   Postop Pain Control: Uneventful            Sign Out: Well controlled pain   PONV: No   Neuro/Psych: Uneventful            Sign Out: Acceptable/Baseline neuro status   Airway/Respiratory: Uneventful            Sign Out: Acceptable/Baseline resp. status   CV/Hemodynamics: Uneventful            Sign Out: Acceptable CV status; No obvious hypovolemia; No obvious fluid overload   Other NRE: NONE   DID A NON-ROUTINE EVENT OCCUR? No           Last vitals:  Vitals:    22 0352 22 0353 22 0400   BP: 126/65  123/62   Pulse:      Resp: 16  16   Temp:      SpO2:  97% 98%       Electronically Signed By: MANUEL NAIK MD  2022  6:10 AM

## 2022-11-07 NOTE — ANESTHESIA PROCEDURE NOTES
"Intrathecal injection Procedure Note    Pre-Procedure   Staff -        Anesthesiologist:  Fidel Kasper MD       Performed By: anesthesiologist       Location: OR       Procedure Start/Stop Times: 11/6/2022 6:35 PM and 11/6/2022 6:38 PM       Pre-Anesthestic Checklist: patient identified, IV checked, risks and benefits discussed, informed consent, monitors and equipment checked, pre-op evaluation, at physician/surgeon's request and post-op pain management  Timeout:       Correct Patient: Yes        Correct Procedure: Yes        Correct Site: Yes        Correct Position: Yes   Procedure Documentation  Procedure: intrathecal injection       Patient Position: sitting       Skin prep: Chloraprep       Insertion Site: L3-4. (midline approach).       Needle Gauge: 25.        Needle Length (Inches): 3.5        Spinal Needle Type: Pencan       Introducer used       # of attempts: 1 and  # of redirects:     Assessment/Narrative         Paresthesias: No.       CSF fluid: clear.       Opening pressure was cmH2O while  Sitting.      Medication(s) Administered   0.75% Hyperbaric Bupivacaine (Intrathecal) - Intrathecal   1.5 mL - 11/6/2022 6:35:00 PM  Fentanyl PF (Intrathecal) - Intrathecal   15 mcg - 11/6/2022 6:35:00 PM  Morphine PF 1 mg/mL (Intrathecal) - Intrathecal   0.15 mg - 11/6/2022 6:41:00 PM  Medication Administration Time: 11/6/2022 6:35 PM      FOR [facility] ([facility] ONLY:   Pain Team Contact information: please page the Pain Team Via [employer]. Search \"Pain\". During daytime hours, please page the attending first. At night please page the resident first.    "

## 2022-11-08 PROBLEM — Z37.9 NORMAL LABOR: Status: RESOLVED | Noted: 2022-11-04 | Resolved: 2022-11-08

## 2022-11-10 PROBLEM — Z13.79 AFRICAN ANCESTRY REQUIRING POPULATION-SPECIFIC GENETIC SCREENING: Status: RESOLVED | Noted: 2022-04-04 | Resolved: 2022-11-10

## 2022-11-10 PROBLEM — U07.1 COVID-19 AFFECTING PREGNANCY IN THIRD TRIMESTER: Status: RESOLVED | Noted: 2022-09-23 | Resolved: 2022-11-10

## 2022-11-10 PROBLEM — O98.513 COVID-19 AFFECTING PREGNANCY IN THIRD TRIMESTER: Status: RESOLVED | Noted: 2022-09-23 | Resolved: 2022-11-10

## 2022-11-10 PROBLEM — Z13.79 GENETIC SCREENING: Status: RESOLVED | Noted: 2022-04-12 | Resolved: 2022-11-10

## 2022-11-10 PROBLEM — O48.0 POST-TERM PREGNANCY, 40-42 WEEKS OF GESTATION: Status: RESOLVED | Noted: 2022-10-28 | Resolved: 2022-11-10

## 2022-11-10 PROBLEM — Z91.89 AT RISK FOR COMPLICATION OF PREGNANCY: Status: RESOLVED | Noted: 2022-04-04 | Resolved: 2022-11-10

## 2022-11-10 PROBLEM — Z83.3 FAMILY HISTORY OF DIABETES MELLITUS IN FIRST DEGREE RELATIVE: Status: RESOLVED | Noted: 2022-04-04 | Resolved: 2022-11-10

## 2022-11-10 PROBLEM — O26.00 EXCESSIVE WEIGHT GAIN AFFECTING PREGNANCY: Status: RESOLVED | Noted: 2022-07-25 | Resolved: 2022-11-10

## 2022-11-14 NOTE — PROGRESS NOTES
"Assessment:   1.  11 day old infant gaining adequate weight on breastfeeding and bottle feeding breastmilk.   2.  Initially a shallow latch, improved with position change; strong suck, with milk transfer adequate to baby's needs during observed feeding in office today  3.  Mother with milk supply becoming established, adequate to baby's needs.  4.  Infant with reluctance to latch, likely due to introduction of bottle feeding and flow preference, improving.   5.  Mother with sore nipples likely due to shallow latch, improving.    Plan:  1. Use good positioning for deep latch, with baby held close to body and baby's head/shoulders/hips in good alignment.  When in a seated position, use a pillow to help bring baby close to breasts, and stepstool to elevate your knees above hips.   2.  Present breast in the \"sandwich\" hold, compressing breast vertically and in line with baby's mouth, for baby to get a larger mouthful of breast and a deeper latch.  If there is pinching or pain, try using a finger to give a little gentle pressure on his chin to help his open more widely and take in more of your breast.  If it is still painful, use a finger to break the suction, remove baby from the breast and try again until there is no pain with nursing.  There is sometimes a little pain when the baby first begins sucking, but after the first few seconds there should be no pain--only a tugging feeling.  3. Babies latch best to the breast by bringing their chin in first, so point your nipple towards baby's nose, tuck the chin in close, and then wait for his mouth to open.  When his mouth opens, bring his head in deeply.  Baby's chin should be snugged deeply in your breast, their upper cheeks should be touching the breast, and their nose just out of the breast.  4. Continue to offer the breast at every feeding, if [place] is really fussy and you cannot soothe him, you can try offering 1/2 oz in a bottle to take the edge off his hunger and " then offer the breast again. If he continues to be fussy, stop offering the breast for that feeding and try at another feeding. If offering a bottle, try offering with the paced bottle feeding method.  5. Hold him often in skin to skin and you can try to offer the breast when he is sleepy or just waking from a nap. Sometimes babies are more willing to latch if they are sleepy.  6. For Elen's spit up after feeding, you can offer burping for a minute or so between breasts or after a feeding. If a burp does not come easily, you do not have to continue burping him. Minimizing stimulation after feedings can help or holding in a semi-upright position afterward before lying baby down can also help. If the spitting up is small amounts of milk and Elen is gaining weight well as he has been, the spit up is not of concern.   7. See pediatric provider as planned, and lactation, as needed.  [employer] can be used for brief questions, but it's important to know that messages are not seen Friday through Sunday. If urgent help is needed, Monday through Friday you can call [phone] and one of our lactation consultants will get the message and respond; if you need a rapid response over a weekend or holiday, it is best to call your on-call maternity or pediatric provider.  Please feel free to schedule a return visit if the concern is more detailed;  telephone visits are also an option if you don't feel you need to be seen in person.      Subjective: Octavio is here today because of difficulty latching.  She feels that since baby required workup in NICU and received bottles, latching to breast has been more difficult.  Attempting to latch at every feeding. Primarily pumping and feeding expressed breastmilk with a bottle. Her baby, Elen, latched twice yesterday and today, which is improving from prior. She is also concerned about Elen spitting up after feedings and is wondering if this is due to trapped air or if baby is eating  too much.    Hospital Course: IOL for post dates pregnancy with cervical ripening and pitocin. Planning unmedicated water birth. Arrest of dilation at 9cm and proceeded to a  birth. Baby was admitted to NICU on DOL 3 for bloody stool, ruled out NEC, ultimately thought to be due to swallowed maternal blood.     Mother's Relevant Med/Surg History: obesity, gestational hypertension    Breast Surgery: none    Breastfeeding Goals: Would like to get back to direct breastfeeding.     Previous Breastfeeding Experience: first baby    Infant's name: Elen Estrella  Infant's bday: 22  Gestational age: 41.0 weeks  Infant's birth weight: 8 lb 12 oz  Discharge weight: 8 lb 2.5 oz  Recent weights:  Wt Readings from Last 4 Encounters:   22 4.116 kg (9 lb 1.2 oz) (81 %, Z= 0.87)*   22 3.842 kg (8 lb 7.5 oz) (72 %, Z= 0.59)*     * Growth percentiles are based on WHO (Boys, 0-2 years) data.       Mode of delivery:  birth  Pediatric Provider: Dr. Paulina Nava with [facility]    Frequency and duration of feedings: Most of the time, waking on his own to feed every 2-3 hours.  Swallows audible per mother: yes  Numbers of feedings in 24 hours:   Number urines per day: 8+ per day  Number of stools per day and their color: 8+, mustard yellow    Supplementation: 70-80mL of expressed breast milk at any feeding that baby is not latching.  Pumping: Every 2-4 hours when offering a bottle.    Objective/Physical exam:   Mother: Noticed breasts grew larger and areolas darkened during pregnancy and she noticed primary engorgement when her milk came in around the 3rd day postpartum.   Her nipples are everted, the areola is compressible, the breast is soft and full. Left nipple had a pinpoint sized, pink, bump on the face of the nipple that appeared before baby's birth.     Sore nipples: right side sore with latch, nipple intact.  EPDS: 11    Assessment of infant: 72% Weight for age  percentile   Age today: 11 days old  Today's weight: 9 lb (excellent weight gain over past 5 days)  Amount of milk transferred from LEFT side: 2 oz  Amount of milk transferred from RIGHT side: content, offered breast but did not latch.    Baby has full flexion of arms and legs, normal tone, behavior is alert and active, respirations are normal, skin is normal, hydration is normal, jaw is normal size and alignment, palate is normal, frenulum is normal, baby can lateralize tongue, has adequate tongue lift, and tongue can protrude past bottom gum line. Upper labial frenulum is normal.    Suck exam:  Baby has strong, coordinated suck with good tongue cupping    Baby thrush: none   Jaundice: none    Feeding assessment: Baby can hold suction with tongue while at the breast.     Alignment: The baby was flex relaxed. Baby's head was aligned with its trunk. Baby did face mother. Baby was in cross-cradle position today.   Areolar Grasp: Baby initially did not open mouth widely but after adjusting position and re-latching, baby was able to open mouth widely. Baby's lips were not pursed. Baby's lips did flange outward. Tongue was visible over bottom gum. Baby had complete seal.     Areolar Compression: Baby made rhythmic motion. There were no clicking or smacking sounds. There was no severe nipple discomfort. Nipples appeared rounded after feeding.  Audible swallowing: Baby made quiet sounds of swallowing: There was an increase in   frequency after milk ejection reflex. The milk ejection reflex is normal and milk supply is becoming established.    /84 (BP Location: Left arm, Patient Position: Sitting, Cuff Size: Adult Large)   LMP 2022 (Exact Date)   Breastfeeding Yes   OB History    Para Term  AB Living   1 1 1 0 0 1   SAB IAB Ectopic Multiple Live Births   0 0 0 0 1      # Outcome Date GA Lbr Johnathan/2nd Weight Sex Delivery Anes PTL Lv   1 Term 22 41w2d  3.969 kg (8 lb 12 oz) M CS-LTranv EPI,  Nitrous, IV, Spinal N HORACIO      Name: YEN MÉNDEZ,MALE-ESTELLE      Apgar1: 8  Apgar5: 9       Current Outpatient Medications:      acetaminophen (TYLENOL) 500 MG tablet, Take 1-2 tablets (500-1,000 mg) by mouth every 6 hours as needed for mild pain, Disp: 60 tablet, Rfl: 0     ibuprofen (ADVIL/MOTRIN) 600 MG tablet, Take 1 tablet (600 mg) by mouth every 6 hours as needed, Disp: 40 tablet, Rfl: 0     SENNA-docusate sodium (SENNA S) 8.6-50 MG tablet, Take 1 tablet by mouth At Bedtime, Disp: 60 tablet, Rfl: 0     ferrous sulfate (SLO-FE) 142 (45 Fe) MG CR tablet, Take 1 tablet (142 mg) by mouth every other day (Patient not taking: Reported on 2022), Disp: 30 tablet, Rfl: 3     Prenatal Vit-Fe Fumarate-FA (PRENATAL MULTIVITAMIN W/IRON) 27-0.8 MG tablet, Take 1 tablet by mouth daily (Patient not taking: Reported on 2022), Disp: 90 tablet, Rfl: 3  Past Medical History:   Diagnosis Date     Abnormal Pap smear of cervix 2022     Chlamydia      Gonorrhea      Past Surgical History:   Procedure Laterality Date      SECTION N/A 2022    Procedure:  SECTION;  Surgeon: Breonna Goncalves MD;  Location: [facility] OR     MOUTH SURGERY       Family History   Problem Relation Age of Onset     Diabetes Mother      Early Death Mother      Schizophrenia Mother      Alcoholism Mother      Depression Mother      Cerebrovascular Disease Mother      Diabetes Sister      Schizophrenia Sister      Cerebrovascular Disease Maternal Grandfather        Time spent:  Chart review/prechartin min prior to/on day of service  Face-to-face visit:   61 min   Documentation:  20 min  Total time spent on day of service: 81 min    MARVIN Saini, CNAYSE, IBCLC  MAZIN CabreraN, RN    Patient was seen with lactation consultant student, Yana Zaman RN,  who was present for learning. I personally assessed, examined and made clinical decisions reflected in the documentation.

## 2022-11-17 ENCOUNTER — ALLIED HEALTH/NURSE VISIT (OUTPATIENT)
Dept: MIDWIFE SERVICES | Facility: CLINIC | Age: 22
End: 2022-11-17
Payer: COMMERCIAL

## 2022-11-17 VITALS — DIASTOLIC BLOOD PRESSURE: 84 MMHG | SYSTOLIC BLOOD PRESSURE: 132 MMHG

## 2022-11-17 DIAGNOSIS — O92.79 OTHER DISORDERS OF LACTATION: Primary | ICD-10-CM

## 2022-11-17 PROCEDURE — 99215 OFFICE O/P EST HI 40 MIN: CPT | Performed by: ADVANCED PRACTICE MIDWIFE

## 2022-11-17 PROCEDURE — 99417 PROLNG OP E/M EACH 15 MIN: CPT | Performed by: ADVANCED PRACTICE MIDWIFE

## 2022-11-17 NOTE — PATIENT INSTRUCTIONS
"1. Use good positioning for deep latch, with baby held close to body and baby's head/shoulders/hips in good alignment.  When in a seated position, use a pillow to help bring baby close to breasts, and stepstool to elevate your knees above hips.   2.  Present breast in the \"sandwich\" hold, compressing breast vertically and in line with baby's mouth, for baby to get a larger mouthful of breast and a deeper latch.  If there is pinching or pain, try using a finger to give a little gentle pressure on his chin to help his open more widely and take in more of your breast.  If it is still painful, use a finger to break the suction, remove baby from the breast and try again until there is no pain with nursing.  There is sometimes a little pain when the baby first begins sucking, but after the first few seconds there should be no pain--only a tugging feeling.  3. Babies latch best to the breast by bringing their chin in first, so point your nipple towards baby's nose, tuck the chin in close, and then wait for his mouth to open.  When his mouth opens, bring his head in deeply.  Baby's chin should be snugged deeply in your breast, their upper cheeks should be touching the breast, and their nose just out of the breast.  4. Continue to offer the breast at every feeding, if Elen is really fussy and you cannot soothe him, you can try offering 1/2 oz in a bottle to take the edge off his hunger and then offer the breast again. If he continues to be fussy, stop offering the breast for that feeding and try at another feeding. If offering a bottle, try offering with the paced bottle feeding method.  5. Hold him often in skin to skin and you can try to offer the breast when he is sleepy or just waking from a nap. Sometimes babies are more willing to latch if they are sleepy.  6. For Elen's spit up after feeding, you can offer burping for a minute or so between breasts or after a feeding. If a burp does not come easily, you do not " "have to continue burping him. Minimizing stimulation after feedings can help or holding in a semi-upright position afterward before lying baby down can also help. If the spitting up is small amounts of milk and Elen is gaining weight well as he has been, the spit up is not of concern.   7. See pediatric provider as planned, and lactation, as needed.  [employer] can be used for brief questions, but it's important to know that messages are not seen Friday through Sunday. If urgent help is needed, Monday through Friday you can call [phone] and one of our lactation consultants will get the message and respond; if you need a rapid response over a weekend or holiday, it is best to call your on-call maternity or pediatric provider.  Please feel free to schedule a return visit if the concern is more detailed;  telephone visits are also an option if you don't feel you need to be seen in person.      ______________________    Explanation and videos on how to do paced bottlefeeding:    How to Bottlefeed a Breastfeeding Baby/Paced Bottle Feeding  [email or web address]     ___________________________      Guidelines for safe bedsharing, according to the Academy of Breastfeeding Medicine:   Never sleep with infants on a sofa, armchair or unsuitable surface, including a pillow  Place infants to sleep away from any person impaired by alcohol or drugs  Place infants on their backs to sleep  Place infants to sleep away from secondhand smoke, away from a caregiver who routinely smokes, and away from objects that smell of smoke  The bed should be away from walls and furniture to prevent wedging of the infant's head or body between surfaces.  An arrangement such as a \"sidecar\" crib or in-bed infant sleep devices can be considered.  The bed surface should be firm (like a crib mattress), without thick covers such as duvets, pillows or other objects that could cover the baby's head and cause suffocation.  The baby " "should not be left alone in an adult bed.  The optimal safe sleep position is the \"C-position\" or \"cuddle curl\":  the infant's head across from the breast and adult's legs and arms curled around the baby, with the baby on their back and away from the pillow.  There is not enough research to make recommendations on how more than one adult should sleep in bed with a baby, or the position of the baby with those adults.   "

## 2022-12-05 ENCOUNTER — DOCUMENTATION ONLY (OUTPATIENT)
Dept: PEDIATRICS | Facility: CLINIC | Age: 22
End: 2022-12-05

## 2022-12-05 ENCOUNTER — MEDICAL CORRESPONDENCE (OUTPATIENT)
Dept: HEALTH INFORMATION MANAGEMENT | Facility: CLINIC | Age: 22
End: 2022-12-05

## 2022-12-05 NOTE — PROGRESS NOTES
Mom screened positive for post-partum depression with Wortham score of 10.  No thoughts of self harm or harm to infant.  Mom states she feels she is doing OK and is just tired.    Encouraged follow up with OB provider to whom this note will be routed.  No upcoming appointments.  This note routed to PCP.

## 2022-12-22 ENCOUNTER — PRENATAL OFFICE VISIT (OUTPATIENT)
Dept: MIDWIFE SERVICES | Facility: CLINIC | Age: 22
End: 2022-12-22
Payer: COMMERCIAL

## 2022-12-22 VITALS
HEIGHT: 64 IN | BODY MASS INDEX: 36.02 KG/M2 | WEIGHT: 211 LBS | SYSTOLIC BLOOD PRESSURE: 126 MMHG | DIASTOLIC BLOOD PRESSURE: 72 MMHG | HEART RATE: 72 BPM

## 2022-12-22 DIAGNOSIS — Z87.59 HISTORY OF POSTPARTUM HEMORRHAGE: ICD-10-CM

## 2022-12-22 DIAGNOSIS — Z98.891 S/P PRIMARY LOW TRANSVERSE C-SECTION: ICD-10-CM

## 2022-12-22 DIAGNOSIS — Z30.8 ENCOUNTER FOR OTHER CONTRACEPTIVE MANAGEMENT: ICD-10-CM

## 2022-12-22 DIAGNOSIS — O13.3 GESTATIONAL HYPERTENSION, THIRD TRIMESTER: ICD-10-CM

## 2022-12-22 PROBLEM — Z71.89 COUNSELING AND COORDINATION OF CARE: Status: RESOLVED | Noted: 2022-06-20 | Resolved: 2022-12-22

## 2022-12-22 PROBLEM — Z34.00 SUPERVISION OF NORMAL FIRST PREGNANCY, ANTEPARTUM: Status: RESOLVED | Noted: 2022-04-04 | Resolved: 2022-12-22

## 2022-12-22 PROBLEM — Z13.31 POSITIVE DEPRESSION SCREENING: Status: RESOLVED | Noted: 2022-09-23 | Resolved: 2022-12-22

## 2022-12-22 ASSESSMENT — ANXIETY QUESTIONNAIRES
7. FEELING AFRAID AS IF SOMETHING AWFUL MIGHT HAPPEN: MORE THAN HALF THE DAYS
GAD7 TOTAL SCORE: 6
GAD7 TOTAL SCORE: 6
6. BECOMING EASILY ANNOYED OR IRRITABLE: MORE THAN HALF THE DAYS
2. NOT BEING ABLE TO STOP OR CONTROL WORRYING: NOT AT ALL
3. WORRYING TOO MUCH ABOUT DIFFERENT THINGS: SEVERAL DAYS
5. BEING SO RESTLESS THAT IT IS HARD TO SIT STILL: NOT AT ALL
1. FEELING NERVOUS, ANXIOUS, OR ON EDGE: SEVERAL DAYS

## 2022-12-22 ASSESSMENT — PATIENT HEALTH QUESTIONNAIRE - PHQ9: 5. POOR APPETITE OR OVEREATING: NOT AT ALL

## 2022-12-22 NOTE — PROGRESS NOTES
6-week Postpartum Visit:     Assessment:     1.  6 weeks postpartum, status post primary  birth for arrest of dilation  2.  Lactating mother  3.  Contraceptive management-desires Mirena  4.  History of POSITIVE depression screening during pregnancy, normal today  5.  Postpartum hemorrhage  6.  Anemia secondary to acute blood loss, asymptomatic  7.  Gestational hypertension, normotensive presently       Plan:   - Reviewed warning signs of postpartum depression. [place]  Mental Health Resource List given for future reference prn.   -PP exercises reviewed and encouraged modified abdominal crunches and Kegels daily. Encouraged integrating formal exercise, such as walking 20-30mns daily.   -Warning signs of breast infections of mastitis and thrush reviewed. Breastfeeding support resource list and contact info given, including [facility] Lactation Consultant and [facility] Lactation Consultants.   -Encouraged to continue with PNV, Vitamin D and DHA supplements.   - Return of fertility discussed. Plans Mirena IUD for contraception.  Written information given today.  RTC in 2 weeks for Mirena IUD insertion.  Recommend no unprotected sexual intercourse for 2 weeks prior to Mirena IUD inserted.  Urine pregnancy test when RTC in 2 weeks.  -Reviewed warning signs of pelvic pain, excessive bleeding or abdominal pain, fever/chills, or signs of breast infection.   -Labs when RTC: CBC.   -RTC for routine health ma at that all thanks no intenance or sooner as needed.     TT with patient 40 mns, >50% time spent in counseling or coordination of care.     Subjective:   Octavio is a 23yo, here for her 6 week postpartum exam. She is integrating birth experience/care and transition well. Bleeding and uterine cramping have ceased. Denies pain. Reports normal bowel and bladder functioning. EPDS score 6/30, 0 to #10.  JACKSON-7: 6. Reports good family/friend support.  Breastfeeding well-established. Feeding q 2-3 hours.   Will  return to work in 8 weeks and plans to integrate pumping prior to her return.   Plans to use Mirena IUD for contraception.  Denies contraindications.    Review of Systems  Pertinent items are noted in HPI.      Objective:     Physical Exam:  General: Pleasant, articulate, well-groomed, well-nourished female.  Not in any apparent distress.  Lungs: nonlabored breathing pattern.  Cardio: Regular rate and rhythm, negative for murmur.   Breasts: Today.  Abdomen: Soft, nontender,  incision approximated, intact and without erythema, edema or purulent discharge.  No masses, negative CVAT.  Pelvic exam: Deferred  Lower extremities: +1 reflexes, no significant edema.

## 2022-12-30 ENCOUNTER — TELEPHONE (OUTPATIENT)
Dept: MIDWIFE SERVICES | Facility: CLINIC | Age: 22
End: 2022-12-30

## 2022-12-30 NOTE — TELEPHONE ENCOUNTER
Pt dropped off a Release to return to work form for Hina to fill out. Would like it faxed to the number provided once completed. Copy is made and original is placed in [facility] mailbox.

## 2023-01-05 ENCOUNTER — MEDICAL CORRESPONDENCE (OUTPATIENT)
Dept: MIDWIFE SERVICES | Facility: CLINIC | Age: 23
End: 2023-01-05

## 2023-01-05 ENCOUNTER — OFFICE VISIT (OUTPATIENT)
Dept: MIDWIFE SERVICES | Facility: CLINIC | Age: 23
End: 2023-01-05
Payer: COMMERCIAL

## 2023-01-05 VITALS — HEART RATE: 72 BPM | DIASTOLIC BLOOD PRESSURE: 74 MMHG | SYSTOLIC BLOOD PRESSURE: 124 MMHG

## 2023-01-05 DIAGNOSIS — Z30.430 ENCOUNTER FOR INSERTION OF INTRAUTERINE CONTRACEPTIVE DEVICE: Primary | ICD-10-CM

## 2023-01-05 LAB — HCG UR QL: NEGATIVE

## 2023-01-05 PROCEDURE — 58300 INSERT INTRAUTERINE DEVICE: CPT | Performed by: ADVANCED PRACTICE MIDWIFE

## 2023-01-05 PROCEDURE — 81025 URINE PREGNANCY TEST: CPT | Performed by: ADVANCED PRACTICE MIDWIFE

## 2023-01-05 PROCEDURE — 87591 N.GONORRHOEAE DNA AMP PROB: CPT | Performed by: ADVANCED PRACTICE MIDWIFE

## 2023-01-05 PROCEDURE — 87491 CHLMYD TRACH DNA AMP PROBE: CPT | Performed by: ADVANCED PRACTICE MIDWIFE

## 2023-01-05 NOTE — PROGRESS NOTES
IUD Insertion:  CONSULT:    Is a pregnancy test required: Yes.  Was it positive or negative?  Negative  Was a consent obtained?  Yes    Subjective: Sudarshan Fierro is a 22 year old  presents for IUD and desires Mirena type IUD.    Patient has been given the opportunity to ask questions about all forms of birth control, including all options appropriate for Sudarshan Fierro. Discussed that no method of birth control, except abstinence is 100% effective against pregnancy or sexually transmitted infection.     Sudarshan Fierro understands she may have the IUD removed at any time. IUD should be removed by a health care provider.    The entire insertion procedure was reviewed with the patient, including care after placement.    No LMP recorded. Has current contraception.  Abstinence.  No allergy to betadine or shellfish. Patient desires STD screening  hCG Urine Qualitative   Date Value Ref Range Status   2022 Positive (A) Negative Final     Comment:     This test is for screening purposes.  Results should be interpreted along with the clinical picture.  Confirmation testing is available if warranted by ordering [id number], HCG Quantitative Pregnancy.         There were no vitals taken for this visit.    Pelvic Exam:   EG/BUS: normal genital architecture without lesions, erythema or abnormal secretions.   Vagina: moist, pink, rugae with physiologic discharge and secretions  Cervix: nulliparous no lesions and pink, moist, closed, without lesion or CMT  Uterus: Anteflexed position, mobile, no pain  Adnexa: within normal limits and no masses, nodularity, tenderness    PROCEDURE NOTE: -- IUD Insertion    Reason for Insertion: contraception    Premedicated with ibuprofen.  Under sterile technique, cervix was visualized with speculum and prepped with Betadine solution swab x 3. Tenaculum was placed for stability. The uterus was gently straightened and sounded to 10.0 cm. IUD prepared for  placement, and IUD inserted according to 's instructions without difficulty or significant resitance, and deployed at the fundus. The strings were visualized and trimmed to 3.0 cm from the external os. Tenaculum was removed and hemostasis noted. Speculum removed.  Patient tolerated procedure well.    Lot #[id number]  Exp: October 2024    EBL: minimal    Complications: none    ASSESSMENT:     ICD-10-CM    1. Encounter for insertion of intrauterine contraceptive device  Z30.430 levonorgestrel (MIRENA) 20 MCG/DAY IUD     levonorgestrel (MIRENA) 20 MCG/DAY IUD 20 mcg     INSERTION INTRAUTERINE DEVICE           PLAN:    Given 's handouts, including when to have IUD removed, list of danger s/sx, side effects and follow up recommended. Encouraged condom use for prevention of STD. Back up contraception advised for 7 days if progestin method. Advised to call for any fever, for prolonged or severe pain or bleeding, abnormal vaginal discharge, or unable to palpate strings. She was advised to use pain medications (ibuprofen) as needed for mild to moderate pain. Advised to follow-up in clinic in 4-6 weeks for IUD string check if unable to find strings or as directed by provider.     Hina Vaughn CNM

## 2023-01-06 LAB
C TRACH DNA SPEC QL PROBE+SIG AMP: NEGATIVE
N GONORRHOEA DNA SPEC QL NAA+PROBE: NEGATIVE

## 2023-01-26 DIAGNOSIS — N93.9 VAGINAL BLEEDING: ICD-10-CM

## 2023-01-26 DIAGNOSIS — N93.9 ABNORMAL UTERINE BLEEDING (AUB): ICD-10-CM

## 2023-01-26 DIAGNOSIS — Z97.5 IUD (INTRAUTERINE DEVICE) IN PLACE: Primary | ICD-10-CM

## 2023-02-02 ENCOUNTER — OFFICE VISIT (OUTPATIENT)
Dept: MIDWIFE SERVICES | Facility: CLINIC | Age: 23
End: 2023-02-02
Payer: COMMERCIAL

## 2023-02-02 VITALS — SYSTOLIC BLOOD PRESSURE: 120 MMHG | DIASTOLIC BLOOD PRESSURE: 62 MMHG

## 2023-02-02 DIAGNOSIS — Z30.431 IUD CHECK UP: Primary | ICD-10-CM

## 2023-02-02 DIAGNOSIS — N93.9 ABNORMAL UTERINE BLEEDING: ICD-10-CM

## 2023-02-02 DIAGNOSIS — R87.612 PAPANICOLAOU SMEAR OF CERVIX WITH LOW GRADE SQUAMOUS INTRAEPITHELIAL LESION (LGSIL): ICD-10-CM

## 2023-02-02 PROCEDURE — 99214 OFFICE O/P EST MOD 30 MIN: CPT | Performed by: ADVANCED PRACTICE MIDWIFE

## 2023-02-02 RX ORDER — IBUPROFEN 800 MG/1
800 TABLET, FILM COATED ORAL 3 TIMES DAILY
Qty: 30 TABLET | Refills: 0 | Status: SHIPPED | OUTPATIENT
Start: 2023-02-02 | End: 2023-06-27

## 2023-02-02 NOTE — PROGRESS NOTES
Assessment:   1.  IUD check  2.  Abnormal uterine bleeding likely secondary to Mirena IUD  3.  Lactating mother  4.  History of abnormal Pap smear--LGSIL    Plan:   -Discussed danger signs and symptoms of the IUD including how to check for strings. Instructed patient to check her strings monthly. Discussed when/where to call with any fever, severe back pain, severe abdominal pain, heavy bleeding (soaking more than 1 pad per hour). Also encouraged to call if she does not feel her strings. She was advised to use Ibuprofen as needed for mild to moderate pain.   -This writer discussed the use of ibuprofen 800 mg p.o. 3 times daily for 5 to 7 days, prescription #30, no refills, sent to patient's preferred pharmacy.  -If desired, at 6 months postpartum, may initiate combined oral contraceptive pills if bleeding does not rico with NSAIDs (to avoid decreased milk supply).    -Please RTC in April 2023 for repeat Pap smear.  -Mirena IUD should be removed by January 5, 2031  -Pelvic ultrasound ordered to ensure proper Mirena IUD placement considering abnormal uterine bleeding    TT with patient 25 mns >50% time spent in counseling or coordination of care.     Subjective:   Sudarshan Fierro is a 22 year old female who presents for IUD check. She had a Mirena inserted on 1/5/2023. Reports vaginal spotting/bleeding since insertion. Bleeding has been moderate either red or brown in color.  Minimal cramping, well-controlled with Ibuprofen.     Review of Systems  Pertinent items are noted in HPI.      Objective:     Physical Exam:  General: Pleasant, articulate, well-groomed, well-nourished female.  Not in any apparent distress.  Abdomen: Soft, nontender, no masses palpated, negative CVAT.  External genitalia: Normal hair distribution, no lesions.  Urethral opening: Without lesions or discharge. No tenderness.   Bladder: Without masses, or tenderness.  Vagina: Pink, rugated, normal-appearing discharge.  Cervix: pink,  smooth, no lesions. IUD strings visualized and 3 cm in length.   Bimanual: small, mobile, nontender, no masses.  Negative CMT.  Adnexa, without masses or tenderness.

## 2023-03-09 ENCOUNTER — MEDICAL CORRESPONDENCE (OUTPATIENT)
Dept: HEALTH INFORMATION MANAGEMENT | Facility: CLINIC | Age: 23
End: 2023-03-09

## 2023-03-15 PROBLEM — R87.612 PAPANICOLAOU SMEAR OF CERVIX WITH LOW GRADE SQUAMOUS INTRAEPITHELIAL LESION (LGSIL): Status: ACTIVE | Noted: 2022-04-08

## 2023-04-13 ENCOUNTER — OFFICE VISIT (OUTPATIENT)
Dept: MIDWIFE SERVICES | Facility: CLINIC | Age: 23
End: 2023-04-13
Payer: COMMERCIAL

## 2023-04-13 ENCOUNTER — HOSPITAL ENCOUNTER (OUTPATIENT)
Dept: ULTRASOUND IMAGING | Facility: HOSPITAL | Age: 23
Discharge: HOME | End: 2023-04-13
Attending: ADVANCED PRACTICE MIDWIFE | Admitting: ADVANCED PRACTICE MIDWIFE
Payer: COMMERCIAL

## 2023-04-13 VITALS
SYSTOLIC BLOOD PRESSURE: 110 MMHG | WEIGHT: 210 LBS | DIASTOLIC BLOOD PRESSURE: 64 MMHG | HEIGHT: 62 IN | BODY MASS INDEX: 38.64 KG/M2

## 2023-04-13 DIAGNOSIS — Z30.431 IUD CHECK UP: ICD-10-CM

## 2023-04-13 DIAGNOSIS — Z11.3 SCREEN FOR STD (SEXUALLY TRANSMITTED DISEASE): ICD-10-CM

## 2023-04-13 DIAGNOSIS — Z01.419 WELL WOMAN EXAM: Primary | ICD-10-CM

## 2023-04-13 DIAGNOSIS — Z30.8 ENCOUNTER FOR OTHER CONTRACEPTIVE MANAGEMENT: ICD-10-CM

## 2023-04-13 DIAGNOSIS — Z12.4 CERVICAL CANCER SCREENING: ICD-10-CM

## 2023-04-13 DIAGNOSIS — R53.83 OTHER FATIGUE: ICD-10-CM

## 2023-04-13 DIAGNOSIS — R20.2 PARESTHESIA: ICD-10-CM

## 2023-04-13 DIAGNOSIS — Z87.59 HISTORY OF POSTPARTUM HEMORRHAGE: ICD-10-CM

## 2023-04-13 LAB
ALBUMIN SERPL BCG-MCNC: 4.4 G/DL (ref 3.5–5.2)
ALP SERPL-CCNC: 75 U/L (ref 35–104)
ALT SERPL W P-5'-P-CCNC: 13 U/L (ref 10–35)
ANION GAP SERPL CALCULATED.3IONS-SCNC: 14 MMOL/L (ref 7–15)
AST SERPL W P-5'-P-CCNC: 22 U/L (ref 10–35)
BILIRUB SERPL-MCNC: <0.2 MG/DL
BUN SERPL-MCNC: 8 MG/DL (ref 6–20)
CALCIUM SERPL-MCNC: 9.9 MG/DL (ref 8.6–10)
CHLORIDE SERPL-SCNC: 102 MMOL/L (ref 98–107)
CLUE CELLS: ABNORMAL
CREAT SERPL-MCNC: 0.72 MG/DL (ref 0.51–0.95)
DEPRECATED HCO3 PLAS-SCNC: 22 MMOL/L (ref 22–29)
ERYTHROCYTE [DISTWIDTH] IN BLOOD BY AUTOMATED COUNT: 16.3 % (ref 10–15)
GFR SERPL CREATININE-BSD FRML MDRD: >90 ML/MIN/1.73M2
GLUCOSE SERPL-MCNC: 73 MG/DL (ref 70–99)
HCT VFR BLD AUTO: 34.9 % (ref 35–47)
HGB BLD-MCNC: 10.9 G/DL (ref 11.7–15.7)
HIV 1+2 AB+HIV1 P24 AG SERPL QL IA: NONREACTIVE
MCH RBC QN AUTO: 24.9 PG (ref 26.5–33)
MCHC RBC AUTO-ENTMCNC: 31.2 G/DL (ref 31.5–36.5)
MCV RBC AUTO: 80 FL (ref 78–100)
PLATELET # BLD AUTO: 301 10E3/UL (ref 150–450)
POTASSIUM SERPL-SCNC: 4.1 MMOL/L (ref 3.4–5.3)
PROT SERPL-MCNC: 8 G/DL (ref 6.4–8.3)
RBC # BLD AUTO: 4.38 10E6/UL (ref 3.8–5.2)
SODIUM SERPL-SCNC: 138 MMOL/L (ref 136–145)
T PALLIDUM AB SER QL: NONREACTIVE
TRICHOMONAS, WET PREP: ABNORMAL
TSH SERPL DL<=0.005 MIU/L-ACNC: 1.83 UIU/ML (ref 0.3–4.2)
WBC # BLD AUTO: 7.4 10E3/UL (ref 4–11)
WBC'S/HIGH POWER FIELD, WET PREP: ABNORMAL
YEAST, WET PREP: ABNORMAL

## 2023-04-13 PROCEDURE — 87591 N.GONORRHOEAE DNA AMP PROB: CPT | Performed by: ADVANCED PRACTICE MIDWIFE

## 2023-04-13 PROCEDURE — 87389 HIV-1 AG W/HIV-1&-2 AB AG IA: CPT | Performed by: ADVANCED PRACTICE MIDWIFE

## 2023-04-13 PROCEDURE — 80053 COMPREHEN METABOLIC PANEL: CPT | Performed by: ADVANCED PRACTICE MIDWIFE

## 2023-04-13 PROCEDURE — 85027 COMPLETE CBC AUTOMATED: CPT | Performed by: ADVANCED PRACTICE MIDWIFE

## 2023-04-13 PROCEDURE — 86780 TREPONEMA PALLIDUM: CPT | Performed by: ADVANCED PRACTICE MIDWIFE

## 2023-04-13 PROCEDURE — 84443 ASSAY THYROID STIM HORMONE: CPT | Performed by: ADVANCED PRACTICE MIDWIFE

## 2023-04-13 PROCEDURE — G0145 SCR C/V CYTO,THINLAYER,RESCR: HCPCS | Performed by: ADVANCED PRACTICE MIDWIFE

## 2023-04-13 PROCEDURE — 99212 OFFICE O/P EST SF 10 MIN: CPT | Mod: 25 | Performed by: ADVANCED PRACTICE MIDWIFE

## 2023-04-13 PROCEDURE — 36415 COLL VENOUS BLD VENIPUNCTURE: CPT | Performed by: ADVANCED PRACTICE MIDWIFE

## 2023-04-13 PROCEDURE — 87210 SMEAR WET MOUNT SALINE/INK: CPT | Performed by: ADVANCED PRACTICE MIDWIFE

## 2023-04-13 PROCEDURE — 99395 PREV VISIT EST AGE 18-39: CPT | Performed by: ADVANCED PRACTICE MIDWIFE

## 2023-04-13 PROCEDURE — 87491 CHLMYD TRACH DNA AMP PROBE: CPT | Performed by: ADVANCED PRACTICE MIDWIFE

## 2023-04-13 PROCEDURE — 76856 US EXAM PELVIC COMPLETE: CPT

## 2023-04-13 NOTE — PROGRESS NOTES
"Assessment:   1.  Well woman exam  2.  Lactating mother  3.  IUD check  4.  Screen for STD  5.  Cervical cancer screening  6.  History of abnormal Pap smear LGSIL  7.  Paresthesia, right anterior thigh status post epidural anesthesia during labor  8.  Fatigue     Plan:      1. Discussed nutrition and exercise.  Advised MVI, Vitamin D3 4000IU geltab and an omega 3 supplement daily   2. Blood tests: TSH with reflex free T4, CBC, CMP, HIV, RPR.  Wet prep and GC/CT cultures obtained per patient request.  3. Breast self exam technique reviewed and patient encouraged to perform self-exam monthly.   4. Contraception: Mirena IUD.  Pelvic ultrasound ordered to verify IUD placement.  5.  Next pap due today.  6.  RTC 1 year for annual physical exam, PRN    Subjective:   Sudarshan Fierro is a 23 year old female who presents for an annual exam.   Cervical cancer screening due.  Breast-feeding exclusively.  Pumping every 3 hours when at work.  Experiences some intermittent pelvic pain.  Curious about IUD placement.  Requesting STI screening.  She is monogamous with FOB.  She does not suspect infidelity.  Right anterior thigh with \"pins-and-needles\" intermittently since the placement of the epidural 5 months ago.  Requesting referral for further evaluation.    Healthy Habits:   Regular Exercise: No   Healthy Diet: Yes  Seat Belt: Yes  Sexually active: Yes  STI risk No  domestic violence No    Self Breast Exam Monthly:Yes  Colonoscopy: No  Lipid Profile: No  Glucose Screen: No  Prevention of Osteoporosis: Yes  Last Dexa: N/A      Immunization History   Administered Date(s) Administered     COVID-19 Vaccine 12+ (Pfizer) 06/04/2021, 06/26/2021     DTAP (<7y) 1999, 1999, 02/22/2000, 12/20/2000, 08/29/2003     HEPATITIS A (PEDS 12M-18Y) 08/04/2009, 11/22/2011     HIB (PRP-T) 1999, 1999, 02/22/2000, 12/20/2000     HPV Quadrivalent 06/30/2015, 02/09/2016     Hepatits B (Peds <19Y) 02/12/2011, 06/30/2015 "     MMR 2000, 2003     Meningococcal ACWY (Menactra ) 2015     Poliovirus, inactivated (IPV) 1999, 1999, 2000, 2003     TDAP (Adacel,Boostrix) 2022     TDAP Vaccine (Adacel) 2011       Gynecologic History  No LMP recorded. (Menstrual status: IUD).  Contraception: IUD Mirena    Cancer screening:   Last Pap: 2022. Results were: LGSIL      OB History    Para Term  AB Living   1 1 1 0 0 1   SAB IAB Ectopic Multiple Live Births   0 0 0 0 1      # Outcome Date GA Lbr Johnathan/2nd Weight Sex Delivery Anes PTL Lv   1 Term 22 41w2d  3.969 kg (8 lb 12 oz) M CS-LTranv EPI, Nitrous, IV, Spinal N HORACIO      Name: Elen      Apgar1: 8  Apgar5: 9       Current Outpatient Medications   Medication Sig Dispense Refill     ELDERBERRY PO        ibuprofen (ADVIL/MOTRIN) 800 MG tablet Take 1 tablet (800 mg) by mouth 3 times daily 30 tablet 0     levonorgestrel (MIRENA) 20 MCG/DAY IUD by Intrauterine route once       acetaminophen (TYLENOL) 500 MG tablet Take 1-2 tablets (500-1,000 mg) by mouth every 6 hours as needed for mild pain (Patient not taking: Reported on 2022) 60 tablet 0     ibuprofen (ADVIL/MOTRIN) 600 MG tablet Take 1 tablet (600 mg) by mouth every 6 hours as needed (Patient not taking: Reported on 2022) 40 tablet 0     SENNA-docusate sodium (SENNA S) 8.6-50 MG tablet Take 1 tablet by mouth At Bedtime (Patient not taking: Reported on 2022) 60 tablet 0     Past Medical History:   Diagnosis Date     Abnormal Pap smear of cervix 2022     Chlamydia      Gonorrhea      Past Surgical History:   Procedure Laterality Date      SECTION N/A 2022    Procedure:  SECTION;  Surgeon: Breonna Goncalves MD;  Location: [facility] OR     [facility] SURGERY       [facility]  Family History   Problem Relation Age of Onset     Diabetes Mother      Early Death Mother      Schizophrenia Mother      Alcoholism Mother       Depression Mother      Cerebrovascular Disease Mother      Diabetes Sister      Schizophrenia Sister      Cerebrovascular Disease Maternal Grandfather      Social History     Socioeconomic History     Marital status: Single     Spouse name: Fidel     Number of children: 1     Years of education: Not on file     Highest education level: Not on file   Occupational History     Not on file   Tobacco Use     Smoking status: Former     Types: Cigarettes     Quit date: 2022     Years since quittin.1     Smokeless tobacco: Never   Vaping Use     Vaping status: Not on file   Substance and Sexual Activity     Alcohol use: Not Currently     Drug use: Never     Sexual activity: Yes     Partners: Male     Birth control/protection: None   Other Topics Concern     Not on file   Social History Narrative    ** Merged History Encounter **          Social Determinants of Health     Financial Resource Strain: High Risk (2022)    Overall Financial Resource Strain (CARDIA)      Difficulty of Paying Living Expenses: Hard   Food Insecurity: Food Insecurity Present (2022)    Hunger Vital Sign      Worried About Running Out of Food in the Last Year: Sometimes true      Ran Out of Food in the Last Year: Sometimes true   Transportation Needs: No Transportation Needs (2022)    PRAPARE - Transportation      Lack of Transportation (Medical): No      Lack of Transportation (Non-Medical): No   Physical Activity: Sufficiently Active (2022)    Exercise Vital Sign      Days of Exercise per Week: 5 days      Minutes of Exercise per Session: 30 min   Stress: Not on file   Social Connections: Moderately Isolated (2022)    Social Connection and Isolation Panel [NHANES]      Frequency of Communication with Friends and Family: More than three times a week      Frequency of Social Gatherings with Friends and Family: Once a week      Attends Tenri[employer] Services: 1 to 4 times per year      Active Member of Clubs or  "Organizations: No      Attends Club or Organization Meetings: Not on file      Marital Status: Never    Intimate Partner Violence: Not on file   Housing Stability: Low Risk  (7/6/2022)    Housing Stability Vital Sign      Unable to Pay for Housing in the Last Year: No      Number of Places Lived in the Last Year: 1      Unstable Housing in the Last Year: No       Review of Systems  General:  Denies problem  Eyes: Denies problem  Ears/Nose/Throat: Denies problem  Cardiovascular: Denies problem  Respiratory:  Denies problem  Gastrointestinal:  denies problems  Genitourinary: denies problems  Musculoskeletal:  Denies problem  Skin: Denies problem  Neurologic:denies problems  Psychiatric: denies problems  Endocrine: fatigue        Objective:      Vitals:    04/13/23 1225   BP: 110/64   Weight: 95.3 kg (210 lb)   Height: 1.581 m (5' 2.25\")       Physical Exam:  General Appearance: Alert, cooperative, no distress, appears stated age  Skin: Skin color, texture, turgor normal, no rashes or lesions  ESTHER: grossly normal; otoscopic and opthalmic exam not performed.   Neck: Supple, symmetrical, trachea midline, no adenopathy;  thyroid: not enlarged, symmetric, no tenderness/mass/nodules  Lungs: Clear to auscultation bilaterally, respirations unlabored  Breasts: deferred  Heart: Regular rate and rhythm, S1 and S2 normal, no murmur  Abdomen: Soft, non-tender. No organomegaly or masses  Pelvic:External genitalia normal without lesions or irritation. Vagina and cervix show no lesions, inflammation, discharge or tenderness. No cystocele, No rectocele. Uterus & adnexal normal without masses or tenderness.  Mirena IUD strings visible at external cervical os 3cm  "

## 2023-04-14 LAB
C TRACH DNA SPEC QL PROBE+SIG AMP: NEGATIVE
N GONORRHOEA DNA SPEC QL NAA+PROBE: NEGATIVE

## 2023-04-18 LAB
BKR LAB AP GYN ADEQUACY: NORMAL
BKR LAB AP GYN INTERPRETATION: NORMAL
BKR LAB AP HPV REFLEX: NO
BKR LAB AP PREVIOUS ABNL DX: NORMAL
BKR LAB AP PREVIOUS ABNORMAL: NORMAL
PATH REPORT.COMMENTS IMP SPEC: NORMAL
PATH REPORT.COMMENTS IMP SPEC: NORMAL
PATH REPORT.RELEVANT HX SPEC: NORMAL

## 2023-04-20 ENCOUNTER — PATIENT OUTREACH (OUTPATIENT)
Dept: MIDWIFE SERVICES | Facility: CLINIC | Age: 23
End: 2023-04-20
Payer: COMMERCIAL

## 2023-06-27 NOTE — PROGRESS NOTES
NEUROLOGY CONSULTATION NOTE       [facility] [place]  [address] [place] [address]  Tel: [phone]  Fax: [phone]  [email or web address]     Sudarshan Fierro,  2000, MRN [id number]  PCP: Enid Rollins  Date: 2023     ASSESSMENT & PLAN     Visit Diagnosis  1. Right meralgia paresthetica     Right meralgia paresthetica  23-year-old female without significant past medical history with right thigh numbness since she delivered a healthy baby.  She likely has right meralgia paresthetica.  I have asked her to lose weight, avoid wearing tight belts and jeans and prescribe gabapentin.  If her symptoms do not improve in 6 months she was told to make follow-up appointment and we can consider imaging studies..  Follow-up will be on as needed basis    Thank you again for this referral, please feel free to contact me if you have any questions.    Hal Lechuga MD  [facility]  (Former[facility], [facility]     REASON FOR CONSULTATION Numbness        HISTORY OF PRESENT ILLNESS     We have been requested by Hina Vaughn to evaluate Sudarshan Fierro who is a 23 year old  female for paresthesia    Patient is a 23-year-old female without significant past medical history who was referred for evaluation of right thigh pins-and-needles since she had epidural in December for delivery.  According to patient she had a prolonged delivery and afterwards she started noticing numbness tingling in ulnar distribution on the right thigh.  At times these symptoms are more severe.  She denies any back pain or similar symptoms on the left.  Also she denies any focal weakness.  There is no history of any numbness tingling of the upper extremity.  She usually takes Tylenol and ibuprofen that does not seem to help     PROBLEM LIST   Patient Active Problem List   Diagnosis Code     Hypovitaminosis D E55.9     Cervical cancer  "screening Z12.4     Papanicolaou smear of cervix with low grade squamous intraepithelial lesion (LGSIL) R87.612     Lactating mother Z39.1     Encounter for other contraceptive management Z30.8     Abnormal uterine bleeding N93.9     Meralgia paresthetica of right side G57.11         PAST MEDICAL & SURGICAL HISTORY     Past Medical History:   Patient  has a past medical history of Abnormal Pap smear of cervix (2022), Chlamydia (), and Gonorrhea ().    Surgical History:  She  has a past surgical history that includes Mouth surgery and  section (N/A, 2022).     SOCIAL HISTORY     Reviewed, and she  reports that she quit smoking about 16 months ago. Her smoking use included cigarettes. She has never used smokeless tobacco. She reports that she does not currently use alcohol. She reports that she does not use drugs.     FAMILY HISTORY     Reviewed, and family history includes Alcoholism in her mother; Cerebrovascular Disease in her maternal grandfather and mother; Depression in her mother; Diabetes in her mother and sister; Early Death in her mother; Schizophrenia in her mother and sister.     ALLERGIES     Allergies   Allergen Reactions     Sukh Torres         REVIEW OF SYSTEMS     A 12 point review of system was performed and was negative except as outlined in the history of present illness.     HOME MEDICATIONS     Current Outpatient Rx   Medication Sig Dispense Refill     gabapentin (NEURONTIN) 100 MG capsule Take 1 capsule (100 mg) by mouth 2 times daily 60 capsule 4     levonorgestrel (MIRENA) 20 MCG/DAY IUD by Intrauterine route once           PHYSICAL EXAM     Vital signs  /67   Pulse 92   Ht 1.575 m (5' 2\")   Wt 98.7 kg (217 lb 9.6 oz)   BMI 39.80 kg/m      Weight:   217 lbs 9.6 oz    Patient is alert and oriented x4 in no acute distress. Vital signs were reviewed and are documented in electronic medical record. Neck was supple, no carotid bruits, thyromegaly, JVD, or " lymphadenopathy was noted.   NEUROLOGY EXAM:    Patient s speech was normal with no aphasia or dysarthria. Mentation, and affect were also normal.     Funduscopic exam was normal, with normal cup to disc ratio. Cranial nerves II -XII were intact.     Patient had normal mass, tone and motor strength was 5/5 in all extremities without pronator drift.     Sensation was decreased in ulnar distribution in the right femoral cutaneous distribution    Reflexes were 1+ symmetrical with downgoing toes.     No dysmetria noted on FNF or HKS. Romberg was negative.    Gait testing was normal. Able to walk on toes/heels. Tandem walk normal.     PERTINENT DIAGNOSTIC STUDIES     Following studies were reviewed:     No recent imaging studies to review     PERTINENT LABS  Following labs were reviewed:  Office Visit on 04/13/2023   Component Date Value     Interpretation 04/13/2023 Negative for Intraepithelial Lesion or Malignancy (NILM)      Comment 04/13/2023                      Value:This result contains rich text formatting which cannot be displayed here.     Specimen Adequacy 04/13/2023 Satisfactory for evaluation, endocervical/transformation zone component present      Clinical Information 04/13/2023                      Value:This result contains rich text formatting which cannot be displayed here.     Reflex Testing 04/13/2023 No      Previous Abnormal? 04/13/2023                      Value:This result contains rich text formatting which cannot be displayed here.     Previous Abnormal Diagno* 04/13/2023                      Value:This result contains rich text formatting which cannot be displayed here.     Performing Labs 04/13/2023                      Value:This result contains rich text formatting which cannot be displayed here.     Chlamydia Trachomatis 04/13/2023 Negative      Neisseria gonorrhoeae 04/13/2023 Negative      Trichomonas 04/13/2023 Absent      Yeast 04/13/2023 Absent      Clue Cells 04/13/2023 Absent       WBCs/high power field 04/13/2023 1+ (A)      WBC Count 04/13/2023 7.4      RBC Count 04/13/2023 4.38      Hemoglobin 04/13/2023 10.9 (L)      Hematocrit 04/13/2023 34.9 (L)      MCV 04/13/2023 80      MCH 04/13/2023 24.9 (L)      MCHC 04/13/2023 31.2 (L)      RDW 04/13/2023 16.3 (H)      Platelet Count 04/13/2023 301      TSH 04/13/2023 1.83      Sodium 04/13/2023 138      Potassium 04/13/2023 4.1      Chloride 04/13/2023 102      Carbon Dioxide (CO2) 04/13/2023 22      Anion Gap 04/13/2023 14      Urea Nitrogen 04/13/2023 8.0      Creatinine 04/13/2023 0.72      Calcium 04/13/2023 9.9      Glucose 04/13/2023 73      Alkaline Phosphatase 04/13/2023 75      AST 04/13/2023 22      ALT 04/13/2023 13      Protein Total 04/13/2023 8.0      Albumin 04/13/2023 4.4      Bilirubin Total 04/13/2023 <0.2      GFR Estimate 04/13/2023 >90      HIV Antigen Antibody Com* 04/13/2023 Nonreactive      Treponema Antibody Total 04/13/2023 Nonreactive         Total time spent for face to face visit, reviewing labs/imaging studies, counseling and coordination of care was: 1 Hour spent on the date of the encounter doing chart review, review of outside records, review of test results, interpretation of tests, patient visit and documentation       This note was dictated using voice recognition software.  Any grammatical or context distortions are unintentional and inherent to the software.    No orders of the defined types were placed in this encounter.     New Prescriptions    GABAPENTIN (NEURONTIN) 100 MG CAPSULE    Take 1 capsule (100 mg) by mouth 2 times daily      Modified Medications    No medications on file

## 2023-06-28 ENCOUNTER — OFFICE VISIT (OUTPATIENT)
Dept: NEUROLOGY | Facility: CLINIC | Age: 23
End: 2023-06-28
Attending: ADVANCED PRACTICE MIDWIFE
Payer: COMMERCIAL

## 2023-06-28 VITALS
DIASTOLIC BLOOD PRESSURE: 67 MMHG | WEIGHT: 217.6 LBS | SYSTOLIC BLOOD PRESSURE: 104 MMHG | BODY MASS INDEX: 40.04 KG/M2 | HEART RATE: 92 BPM | HEIGHT: 62 IN

## 2023-06-28 DIAGNOSIS — G57.11 MERALGIA PARESTHETICA OF RIGHT SIDE: Primary | ICD-10-CM

## 2023-06-28 PROCEDURE — 99205 OFFICE O/P NEW HI 60 MIN: CPT | Performed by: PSYCHIATRY & NEUROLOGY

## 2023-06-28 RX ORDER — GABAPENTIN 100 MG/1
100 CAPSULE ORAL 2 TIMES DAILY
Qty: 60 CAPSULE | Refills: 4 | Status: SHIPPED | OUTPATIENT
Start: 2023-06-28 | End: 2023-11-24

## 2023-06-28 NOTE — LETTER
2023         RE: Sudarshan Fierro  [address]  [place] [address]        Dear Colleague,    Thank you for referring your patient, Sudarshan Fierro, to the [facility] [place]. Please see a copy of my visit note below.    NEUROLOGY CONSULTATION NOTE       [facility] [place]  [address] [place] [address]  Tel: [phone]  Fax: [phone]  [email or web address]     Sudarshan Fierro,  2000, MRN [id number]  PCP: Enid Rollins  Date: 2023     ASSESSMENT & PLAN     Visit Diagnosis  1. Right meralgia paresthetica     Right meralgia paresthetica  23-year-old female without significant past medical history with right thigh numbness since she delivered a healthy baby.  She likely has right meralgia paresthetica.  I have asked her to lose weight, avoid wearing tight belts and jeans and prescribe gabapentin.  If her symptoms do not improve in 6 months she was told to make follow-up appointment and we can consider imaging studies..  Follow-up will be on as needed basis    Thank you again for this referral, please feel free to contact me if you have any questions.    Hal Lechuga MD  [facility], [place]  (Formerly, [facility]     REASON FOR CONSULTATION Numbness        HISTORY OF PRESENT ILLNESS     We have been requested by Hina Vaughn to evaluate Sudarshan Fierro who is a 23 year old  female for paresthesia    Patient is a 23-year-old female without significant past medical history who was referred for evaluation of right thigh pins-and-needles since she had epidural in December for delivery.  According to patient she had a prolonged delivery and afterwards she started noticing numbness tingling in ulnar distribution on the right thigh.  At times these symptoms are more severe.  She denies any back pain or similar symptoms on the left.   Also she denies any focal weakness.  There is no history of any numbness tingling of the upper extremity.  She usually takes Tylenol and ibuprofen that does not seem to help     PROBLEM LIST   Patient Active Problem List   Diagnosis Code     Hypovitaminosis D E55.9     Cervical cancer screening Z12.4     Papanicolaou smear of cervix with low grade squamous intraepithelial lesion (LGSIL) R87.612     Lactating mother Z39.1     Encounter for other contraceptive management Z30.8     Abnormal uterine bleeding N93.9     Meralgia paresthetica of right side G57.11         PAST MEDICAL & SURGICAL HISTORY     Past Medical History:   Patient  has a past medical history of Abnormal Pap smear of cervix (2022), Chlamydia (), and Gonorrhea ().    Surgical History:  She  has a past surgical history that includes Mouth surgery and  section (N/A, 2022).     SOCIAL HISTORY     Reviewed, and she  reports that she quit smoking about 16 months ago. Her smoking use included cigarettes. She has never used smokeless tobacco. She reports that she does not currently use alcohol. She reports that she does not use drugs.     FAMILY HISTORY     Reviewed, and family history includes Alcoholism in her mother; Cerebrovascular Disease in her maternal grandfather and mother; Depression in her mother; Diabetes in her mother and sister; Early Death in her mother; Schizophrenia in her mother and sister.     ALLERGIES     Allergies   Allergen Reactions     Sukh Torres         REVIEW OF SYSTEMS     A 12 point review of system was performed and was negative except as outlined in the history of present illness.     HOME MEDICATIONS     Current Outpatient Rx   Medication Sig Dispense Refill     gabapentin (NEURONTIN) 100 MG capsule Take 1 capsule (100 mg) by mouth 2 times daily 60 capsule 4     levonorgestrel (MIRENA) 20 MCG/DAY IUD by Intrauterine route once           PHYSICAL EXAM     Vital signs  /67   Pulse 92   Ht  "1.575 m (5' 2\")   Wt 98.7 kg (217 lb 9.6 oz)   BMI 39.80 kg/m      Weight:   217 lbs 9.6 oz    Patient is alert and oriented x4 in no acute distress. Vital signs were reviewed and are documented in electronic medical record. Neck was supple, no carotid bruits, thyromegaly, JVD, or lymphadenopathy was noted.   NEUROLOGY EXAM:    Patient s speech was normal with no aphasia or dysarthria. Mentation, and affect were also normal.     Funduscopic exam was normal, with normal cup to disc ratio. Cranial nerves II -XII were intact.     Patient had normal mass, tone and motor strength was 5/5 in all extremities without pronator drift.     Sensation was decreased in ulnar distribution in the right femoral cutaneous distribution    Reflexes were 1+ symmetrical with downgoing toes.     No dysmetria noted on FNF or HKS. Romberg was negative.    Gait testing was normal. Able to walk on toes/heels. Tandem walk normal.     PERTINENT DIAGNOSTIC STUDIES     Following studies were reviewed:     No recent imaging studies to review     PERTINENT LABS  Following labs were reviewed:  Office Visit on 04/13/2023   Component Date Value     Interpretation 04/13/2023 Negative for Intraepithelial Lesion or Malignancy (NILM)      Comment 04/13/2023                      Value:This result contains rich text formatting which cannot be displayed here.     Specimen Adequacy 04/13/2023 Satisfactory for evaluation, endocervical/transformation zone component present      Clinical Information 04/13/2023                      Value:This result contains rich text formatting which cannot be displayed here.     Reflex Testing 04/13/2023 No      Previous Abnormal? 04/13/2023                      Value:This result contains rich text formatting which cannot be displayed here.     Previous Abnormal Diagno* 04/13/2023                      Value:This result contains rich text formatting which cannot be displayed here.     Performing Labs 04/13/2023                 "      Value:This result contains rich text formatting which cannot be displayed here.     Chlamydia Trachomatis 04/13/2023 Negative      Neisseria gonorrhoeae 04/13/2023 Negative      Trichomonas 04/13/2023 Absent      Yeast 04/13/2023 Absent      Clue Cells 04/13/2023 Absent      WBCs/high power field 04/13/2023 1+ (A)      WBC Count 04/13/2023 7.4      RBC Count 04/13/2023 4.38      Hemoglobin 04/13/2023 10.9 (L)      Hematocrit 04/13/2023 34.9 (L)      MCV 04/13/2023 80      MCH 04/13/2023 24.9 (L)      MCHC 04/13/2023 31.2 (L)      RDW 04/13/2023 16.3 (H)      Platelet Count 04/13/2023 301      TSH 04/13/2023 1.83      Sodium 04/13/2023 138      Potassium 04/13/2023 4.1      Chloride 04/13/2023 102      Carbon Dioxide (CO2) 04/13/2023 22      Anion Gap 04/13/2023 14      Urea Nitrogen 04/13/2023 8.0      Creatinine 04/13/2023 0.72      Calcium 04/13/2023 9.9      Glucose 04/13/2023 73      Alkaline Phosphatase 04/13/2023 75      AST 04/13/2023 22      ALT 04/13/2023 13      Protein Total 04/13/2023 8.0      Albumin 04/13/2023 4.4      Bilirubin Total 04/13/2023 <0.2      GFR Estimate 04/13/2023 >90      HIV Antigen Antibody Com* 04/13/2023 Nonreactive      Treponema Antibody Total 04/13/2023 Nonreactive         Total time spent for face to face visit, reviewing labs/imaging studies, counseling and coordination of care was: 1 Hour spent on the date of the encounter doing chart review, review of outside records, review of test results, interpretation of tests, patient visit and documentation       This note was dictated using voice recognition software.  Any grammatical or context distortions are unintentional and inherent to the software.    No orders of the defined types were placed in this encounter.     New Prescriptions    GABAPENTIN (NEURONTIN) 100 MG CAPSULE    Take 1 capsule (100 mg) by mouth 2 times daily      Modified Medications    No medications on file              Again, thank you for allowing me to  participate in the care of your patient.        Sincerely,        Hal Lechuga MD

## 2023-06-28 NOTE — NURSING NOTE
Chief Complaint   Patient presents with     Numbness     New patient     Bev Tellez on 6/28/2023 at 11:21 AM

## 2023-11-13 NOTE — PROGRESS NOTES
"Octavio is here with her sister for a routine prenatal visit at 21w3d. Fidel comes home Friday. Looking forward to having him home but is aware he could deploy at any time. Ultrasound scheduled for next week.  Unsure if they will do any CBE, will discuss with Fidel. Struggling with seep as she is a 'stomach sleeper'. Demonstrated \"running man\"/\"exaggerated side lying\" position today and enc to try for sleep.  Had not tried this position so hoping that we will be more comfortable.  Has felt fetal movement for a week or 2.  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  4 pound interval weight gain over the last 4 weeks, praised for this- encouraged to continue watching portion size, increasing vegetables and protein, and decreasing sugary drinks.  Discussed option for care coordination referral to help patient connect with pregnancy resources in the community including WIC and home visiting nurse programs.  Patient accepts, gladly!  Mid pregnancy anticipatory guidance reviewed.     Enc follow-up in 4weeks or sooner prn.     "
20.6

## 2023-11-23 DIAGNOSIS — G57.11 MERALGIA PARESTHETICA OF RIGHT SIDE: ICD-10-CM

## 2023-11-24 RX ORDER — GABAPENTIN 100 MG/1
100 CAPSULE ORAL 2 TIMES DAILY
Qty: 60 CAPSULE | Refills: 0 | Status: SHIPPED | OUTPATIENT
Start: 2023-11-24 | End: 2023-12-26

## 2023-11-24 NOTE — TELEPHONE ENCOUNTER
"Refill request for: gabapentin (NEURONTIN) 100 MG capsule    Directions: Take 1 capsule (100 mg) by mouth 2 times daily     LOV: 6/28/23  NOV: Not scheduled- Per last OV:  \"If her symptoms do not improve in 6 months she was told to make follow-up appointment and we can consider imaging studies.\"    I have sent a [email or web address] message to Octavio      30 day supply with 0 refills Medication T'd for review and signature  Harika Pacheco CMA on 11/24/2023 at 9:43 AM  [facility]     "

## 2023-12-24 DIAGNOSIS — G57.11 MERALGIA PARESTHETICA OF RIGHT SIDE: ICD-10-CM

## 2023-12-26 RX ORDER — GABAPENTIN 100 MG/1
100 CAPSULE ORAL 2 TIMES DAILY
Qty: 60 CAPSULE | Refills: 0 | Status: SHIPPED | OUTPATIENT
Start: 2023-12-26

## 2023-12-26 NOTE — TELEPHONE ENCOUNTER
"Refill request for: gabapentin  100mg  Directions: TAKE 1 CAPSULE(100 MG) BY MOUTH TWICE DAILY     LOV: 06/28/23  NOV: No future appt scheduled.  Not scheduled- Per last OV:  \"If her symptoms do not improve in 6 months she was told to make follow-up appointment and we can consider imaging studies.\" [employer] message was sent to pt on 11/23/23. Pt has not read message.    30 day supply with 0 refills Medication T'd for review and signature    Ramya Madison LPN on 12/26/2023 at 11:01 AM    "

## 2024-03-14 ENCOUNTER — OFFICE VISIT (OUTPATIENT)
Dept: FAMILY MEDICINE | Facility: CLINIC | Age: 24
End: 2024-03-14
Payer: COMMERCIAL

## 2024-03-14 ENCOUNTER — APPOINTMENT (OUTPATIENT)
Dept: CT IMAGING | Facility: HOSPITAL | Age: 24
End: 2024-03-14
Attending: EMERGENCY MEDICINE
Payer: COMMERCIAL

## 2024-03-14 VITALS
TEMPERATURE: 97.8 F | DIASTOLIC BLOOD PRESSURE: 76 MMHG | HEART RATE: 79 BPM | RESPIRATION RATE: 16 BRPM | OXYGEN SATURATION: 99 % | SYSTOLIC BLOOD PRESSURE: 112 MMHG

## 2024-03-14 DIAGNOSIS — R10.84 ABDOMINAL PAIN, GENERALIZED: Primary | ICD-10-CM

## 2024-03-14 LAB
ALBUMIN SERPL BCG-MCNC: 4 G/DL (ref 3.5–5.2)
ALBUMIN UR-MCNC: NEGATIVE MG/DL
ALP SERPL-CCNC: 59 U/L (ref 40–150)
ALT SERPL W P-5'-P-CCNC: 13 U/L (ref 0–50)
ANION GAP SERPL CALCULATED.3IONS-SCNC: 10 MMOL/L (ref 7–15)
APPEARANCE UR: CLEAR
AST SERPL W P-5'-P-CCNC: 17 U/L (ref 0–45)
BACTERIA #/AREA URNS HPF: ABNORMAL /HPF
BASOPHILS # BLD AUTO: 0 10E3/UL (ref 0–0.2)
BASOPHILS NFR BLD AUTO: 0 %
BILIRUB SERPL-MCNC: <0.2 MG/DL
BILIRUB UR QL STRIP: NEGATIVE
BUN SERPL-MCNC: 4.3 MG/DL (ref 6–20)
CALCIUM SERPL-MCNC: 9.3 MG/DL (ref 8.6–10)
CHLORIDE SERPL-SCNC: 105 MMOL/L (ref 98–107)
COLOR UR AUTO: YELLOW
CREAT SERPL-MCNC: 0.69 MG/DL (ref 0.51–0.95)
DEPRECATED HCO3 PLAS-SCNC: 26 MMOL/L (ref 22–29)
EGFRCR SERPLBLD CKD-EPI 2021: >90 ML/MIN/1.73M2
EOSINOPHIL # BLD AUTO: 0.1 10E3/UL (ref 0–0.7)
EOSINOPHIL NFR BLD AUTO: 2 %
ERYTHROCYTE [DISTWIDTH] IN BLOOD BY AUTOMATED COUNT: 13.5 % (ref 10–15)
GLUCOSE SERPL-MCNC: 78 MG/DL (ref 70–99)
GLUCOSE UR STRIP-MCNC: NEGATIVE MG/DL
HCG UR QL: NEGATIVE
HCT VFR BLD AUTO: 37.4 % (ref 35–47)
HGB BLD-MCNC: 11.9 G/DL (ref 11.7–15.7)
HGB UR QL STRIP: ABNORMAL
IMM GRANULOCYTES # BLD: 0 10E3/UL
IMM GRANULOCYTES NFR BLD: 0 %
KETONES UR STRIP-MCNC: NEGATIVE MG/DL
LEUKOCYTE ESTERASE UR QL STRIP: ABNORMAL
LIPASE SERPL-CCNC: 20 U/L (ref 13–60)
LYMPHOCYTES # BLD AUTO: 1.7 10E3/UL (ref 0.8–5.3)
LYMPHOCYTES NFR BLD AUTO: 27 %
MCH RBC QN AUTO: 26.7 PG (ref 26.5–33)
MCHC RBC AUTO-ENTMCNC: 31.8 G/DL (ref 31.5–36.5)
MCV RBC AUTO: 84 FL (ref 78–100)
MONOCYTES # BLD AUTO: 0.8 10E3/UL (ref 0–1.3)
MONOCYTES NFR BLD AUTO: 12 %
NEUTROPHILS # BLD AUTO: 3.7 10E3/UL (ref 1.6–8.3)
NEUTROPHILS NFR BLD AUTO: 58 %
NITRATE UR QL: NEGATIVE
PH UR STRIP: 5.5 [PH] (ref 5–8)
PLATELET # BLD AUTO: 311 10E3/UL (ref 150–450)
POTASSIUM SERPL-SCNC: 4.5 MMOL/L (ref 3.4–5.3)
PROT SERPL-MCNC: 7.9 G/DL (ref 6.4–8.3)
RBC # BLD AUTO: 4.46 10E6/UL (ref 3.8–5.2)
RBC #/AREA URNS AUTO: ABNORMAL /HPF
SODIUM SERPL-SCNC: 141 MMOL/L (ref 135–145)
SP GR UR STRIP: <=1.005 (ref 1–1.03)
SQUAMOUS #/AREA URNS AUTO: ABNORMAL /LPF
UROBILINOGEN UR STRIP-ACNC: 0.2 E.U./DL
WBC # BLD AUTO: 6.3 10E3/UL (ref 4–11)
WBC #/AREA URNS AUTO: ABNORMAL /HPF
YEAST #/AREA URNS HPF: ABNORMAL /HPF

## 2024-03-14 PROCEDURE — 250N000011 HC RX IP 250 OP 636: Performed by: EMERGENCY MEDICINE

## 2024-03-14 PROCEDURE — 81025 URINE PREGNANCY TEST: CPT | Performed by: NURSE PRACTITIONER

## 2024-03-14 PROCEDURE — 83690 ASSAY OF LIPASE: CPT | Performed by: NURSE PRACTITIONER

## 2024-03-14 PROCEDURE — 81001 URINALYSIS AUTO W/SCOPE: CPT | Performed by: NURSE PRACTITIONER

## 2024-03-14 PROCEDURE — 99215 OFFICE O/P EST HI 40 MIN: CPT | Performed by: NURSE PRACTITIONER

## 2024-03-14 PROCEDURE — 80053 COMPREHEN METABOLIC PANEL: CPT | Performed by: NURSE PRACTITIONER

## 2024-03-14 PROCEDURE — 87086 URINE CULTURE/COLONY COUNT: CPT | Performed by: NURSE PRACTITIONER

## 2024-03-14 PROCEDURE — 85025 COMPLETE CBC W/AUTO DIFF WBC: CPT | Performed by: NURSE PRACTITIONER

## 2024-03-14 PROCEDURE — 250N000013 HC RX MED GY IP 250 OP 250 PS 637: Performed by: EMERGENCY MEDICINE

## 2024-03-14 PROCEDURE — 99285 EMERGENCY DEPT VISIT HI MDM: CPT | Mod: 25

## 2024-03-14 PROCEDURE — 74177 CT ABD & PELVIS W/CONTRAST: CPT

## 2024-03-14 PROCEDURE — 36415 COLL VENOUS BLD VENIPUNCTURE: CPT | Performed by: NURSE PRACTITIONER

## 2024-03-14 RX ORDER — PANTOPRAZOLE SODIUM 20 MG/1
40 TABLET, DELAYED RELEASE ORAL ONCE
Status: COMPLETED | OUTPATIENT
Start: 2024-03-14 | End: 2024-03-14

## 2024-03-14 RX ORDER — IOPAMIDOL 755 MG/ML
85 INJECTION, SOLUTION INTRAVASCULAR ONCE
Status: COMPLETED | OUTPATIENT
Start: 2024-03-14 | End: 2024-03-14

## 2024-03-14 RX ORDER — SUCRALFATE ORAL 1 G/10ML
2 SUSPENSION ORAL ONCE
Status: COMPLETED | OUTPATIENT
Start: 2024-03-14 | End: 2024-03-14

## 2024-03-14 RX ORDER — ALUMINA, MAGNESIA, AND SIMETHICONE 2400; 2400; 240 MG/30ML; MG/30ML; MG/30ML
15 SUSPENSION ORAL ONCE
Status: COMPLETED | OUTPATIENT
Start: 2024-03-14 | End: 2024-03-14

## 2024-03-14 RX ADMIN — ALUMINA, MAGNESIA, AND SIMETHICONE 15 ML: 2400; 2400; 240 SUSPENSION ORAL at 17:41

## 2024-03-14 RX ADMIN — IOPAMIDOL 85 ML: 755 INJECTION, SOLUTION INTRAVENOUS at 22:54

## 2024-03-14 RX ADMIN — SUCRALFATE 2 G: 1 SUSPENSION ORAL at 22:08

## 2024-03-14 RX ADMIN — PANTOPRAZOLE SODIUM 40 MG: 20 TABLET, DELAYED RELEASE ORAL at 22:06

## 2024-03-14 ASSESSMENT — COLUMBIA-SUICIDE SEVERITY RATING SCALE - C-SSRS
1. IN THE PAST MONTH, HAVE YOU WISHED YOU WERE DEAD OR WISHED YOU COULD GO TO SLEEP AND NOT WAKE UP?: YES
2. HAVE YOU ACTUALLY HAD ANY THOUGHTS OF KILLING YOURSELF IN THE PAST MONTH?: NO
6. HAVE YOU EVER DONE ANYTHING, STARTED TO DO ANYTHING, OR PREPARED TO DO ANYTHING TO END YOUR LIFE?: NO

## 2024-03-14 ASSESSMENT — ENCOUNTER SYMPTOMS
VOMITING: 0
CONSTIPATION: 1
HEMATOCHEZIA: 0
DIAPHORESIS: 0
FEVER: 0
NAUSEA: 1
CHILLS: 0
DIARRHEA: 1
ABDOMINAL PAIN: 1

## 2024-03-14 NOTE — PROGRESS NOTES
"Assessment & Plan     Abdominal pain, generalized    - CBC with platelets and differential  - Comprehensive metabolic panel (BMP + Alb, Alk Phos, ALT, AST, Total. Bili, TP)  - Lipase  - HCG qualitative urine  - UA Macroscopic with reflex to Microscopic and Culture - Clinic Collect  - CBC with platelets and differential  - Comprehensive metabolic panel (BMP + Alb, Alk Phos, ALT, AST, Total. Bili, TP)  - Lipase  - alum & mag hydroxide-simethicone (MAALOX  ES) suspension 15 mL  - UA Microscopic with Reflex to Culture     Patient with constant abdominal pain across the middle abdomen associated with severe cramping episodes that were so severe that she could not sleep last night.  Randy across the middle abdomen here.  Not specifically epigastric nor lower.  Is not having any other symptoms except for diarrhea yesterday and none today.  Does not feel like she is constipated.  Abdomen is soft.  No history of abdominal surgeries.  Labs as above all came back and were negative with the exception of bacteria in the urine without WBCs or dysuria.    Patient is breast-feeding notably.    Patient says she feels bad enough that she would like to go to the emergency room for further evaluation after discussion.    Recheck after Maalox and there is no change in pain.  Currently still rates it 5 out of 10 going up to 9 or higher.  Abdominal exam is unchanged.    Called and spoke to [facility], Dr. Mi.  Patient and her  will go to [facility].          No follow-ups on file.    Tameka [facility] YVES Cunningham is a 24 year old female who presents to clinic today for the following health issues:  Chief Complaint   Patient presents with    Abdominal Pain     Started tuesday night. Slight nausea. Force self to vomiting to relief Sx.    Diarrhea     Runny stool started tuesday night.     Abdominal Pain   Associated symptoms include diarrhea, nausea and constipation (\"a little\"). " "Pertinent negatives include fever, hematochezia and vomiting.   Diarrhea  Associated symptoms include abdominal pain and nausea. Pertinent negatives include no chills, diaphoresis, fever or vomiting.       Having abd cramping every 30 minutes to 1 hour starting a couple days ago.  Each episode lasts 15-30 seconds.  Having diarrhea yesterday, none today.   Last episode was a few minutes ago.  Nothing for pain.      Couldn't sleep due to severity of cramping last night.  Currently rates pain 5 out of 10.  Will go up to 8 or higher with the cramping episodes.    Sometimes will have stomach pains after eating food.      Ate last night x 1.  Ate today. Had ibuprofen last night which didn't help.      No ill contacts.      No missed periods.  No dysuria.      Had cereal this morning.  Had some pain when she started eating.  Unsure if made pain worse.      Is breastfeeding.  Son also drinks whole milk.                Review of Systems   Constitutional:  Negative for chills, diaphoresis and fever.   Gastrointestinal:  Positive for abdominal pain, constipation (\"a little\"), diarrhea and nausea. Negative for hematochezia and vomiting.           Objective    /76   Pulse 79   Temp 97.8  F (36.6  C) (Oral)   Resp 16   SpO2 99%   Physical Exam  Constitutional:       General: She is not in acute distress.     Appearance: She is well-developed.   Eyes:      General:         Right eye: No discharge.         Left eye: No discharge.      Conjunctiva/sclera: Conjunctivae normal.   Pulmonary:      Effort: Pulmonary effort is normal.   Abdominal:      Tenderness: There is abdominal tenderness (Tenderness across middle abdomen.  No right lower quadrant tenderness nor epigastric tenderness.). There is no right CVA tenderness or left CVA tenderness.          Comments: Area of reported pain, tenderness       Musculoskeletal:         General: Normal range of motion.   Skin:     General: Skin is warm and dry.      Capillary Refill: " Capillary refill takes less than 2 seconds.   Neurological:      Mental Status: She is alert and oriented to person, place, and time.   Psychiatric:         Mood and Affect: Mood normal.         Behavior: Behavior normal.         Thought Content: Thought content normal.         Judgment: Judgment normal.            Results for orders placed or performed in visit on 03/14/24 (from the past 24 hour(s))   HCG qualitative urine   Result Value Ref Range    hCG Urine Qualitative Negative Negative   UA Macroscopic with reflex to Microscopic and Culture - Clinic Collect    Specimen: Urine, NOS   Result Value Ref Range    Color Urine Yellow Colorless, Straw, Light Yellow, Yellow    Appearance Urine Clear Clear    Glucose Urine Negative Negative mg/dL    Bilirubin Urine Negative Negative    Ketones Urine Negative Negative mg/dL    Specific Gravity Urine <=1.005 1.005 - 1.030    Blood Urine Trace (A) Negative    pH Urine 5.5 5.0 - 8.0    Protein Albumin Urine Negative Negative mg/dL    Urobilinogen Urine 0.2 0.2, 1.0 E.U./dL    Nitrite Urine Negative Negative    Leukocyte Esterase Urine Small (A) Negative   CBC with platelets and differential    Narrative    The following orders were created for panel order CBC with platelets and differential.  Procedure                               Abnormality         Status                     ---------                               -----------         ------                     CBC with platelets and d...[[id number]]                      Final result                 Please view results for these tests on the individual orders.   Comprehensive metabolic panel (BMP + Alb, Alk Phos, ALT, AST, Total. Bili, TP)   Result Value Ref Range    Sodium 141 135 - 145 mmol/L    Potassium 4.5 3.4 - 5.3 mmol/L    Carbon Dioxide (CO2) 26 22 - 29 mmol/L    Anion Gap 10 7 - 15 mmol/L    Urea Nitrogen 4.3 (L) 6.0 - 20.0 mg/dL    Creatinine 0.69 0.51 - 0.95 mg/dL    GFR Estimate >90 >60 mL/min/1.73m2    Calcium  9.3 8.6 - 10.0 mg/dL    Chloride 105 98 - 107 mmol/L    Glucose 78 70 - 99 mg/dL    Alkaline Phosphatase 59 40 - 150 U/L    AST 17 0 - 45 U/L    ALT 13 0 - 50 U/L    Protein Total 7.9 6.4 - 8.3 g/dL    Albumin 4.0 3.5 - 5.2 g/dL    Bilirubin Total <0.2 <=1.2 mg/dL   Lipase   Result Value Ref Range    Lipase 20 13 - 60 U/L   CBC with platelets and differential   Result Value Ref Range    WBC Count 6.3 4.0 - 11.0 10e3/uL    RBC Count 4.46 3.80 - 5.20 10e6/uL    Hemoglobin 11.9 11.7 - 15.7 g/dL    Hematocrit 37.4 35.0 - 47.0 %    MCV 84 78 - 100 fL    MCH 26.7 26.5 - 33.0 pg    MCHC 31.8 31.5 - 36.5 g/dL    RDW 13.5 10.0 - 15.0 %    Platelet Count 311 150 - 450 10e3/uL    % Neutrophils 58 %    % Lymphocytes 27 %    % Monocytes 12 %    % Eosinophils 2 %    % Basophils 0 %    % Immature Granulocytes 0 %    Absolute Neutrophils 3.7 1.6 - 8.3 10e3/uL    Absolute Lymphocytes 1.7 0.8 - 5.3 10e3/uL    Absolute Monocytes 0.8 0.0 - 1.3 10e3/uL    Absolute Eosinophils 0.1 0.0 - 0.7 10e3/uL    Absolute Basophils 0.0 0.0 - 0.2 10e3/uL    Absolute Immature Granulocytes 0.0 <=0.4 10e3/uL   UA Microscopic with Reflex to Culture   Result Value Ref Range    Bacteria Urine Moderate (A) None Seen /HPF    RBC Urine 2-5 (A) 0-2 /HPF /HPF    WBC Urine 0-5 0-5 /HPF /HPF    Squamous Epithelials Urine Few (A) None Seen /LPF    Budding Yeast Urine Few (A) None Seen /HPF    Narrative    Urine Culture not indicated

## 2024-03-15 ENCOUNTER — HOSPITAL ENCOUNTER (EMERGENCY)
Facility: HOSPITAL | Age: 24
Discharge: HOME | End: 2024-03-15
Attending: EMERGENCY MEDICINE | Admitting: EMERGENCY MEDICINE
Payer: COMMERCIAL

## 2024-03-15 VITALS
HEIGHT: 64 IN | SYSTOLIC BLOOD PRESSURE: 120 MMHG | DIASTOLIC BLOOD PRESSURE: 60 MMHG | HEART RATE: 80 BPM | TEMPERATURE: 98.1 F | BODY MASS INDEX: 37.65 KG/M2 | WEIGHT: 220.5 LBS | RESPIRATION RATE: 18 BRPM | OXYGEN SATURATION: 100 %

## 2024-03-15 DIAGNOSIS — R10.9 ABDOMINAL WALL PAIN: ICD-10-CM

## 2024-03-15 NOTE — ED NOTES
Pain radiates to the sides but is primarily in the middle. Pain   2.5 weeks ago had a virus, but diarrhea began yesterday earlier in day and stopped at 1700. No BM's today. Pt would like to hold off on IV unless needed, blood work in results hx from clinic

## 2024-03-15 NOTE — DISCHARGE INSTRUCTIONS
Your outside blood tests were normal  The CT of your abdomen does not show any significant worrisome findings.  Your pain may be from your abdominal wall.  Try 1000 mg Tylenol and 400 mg Motrin 3 times a day  Return here within 48 hours if you have worsening symptoms

## 2024-03-15 NOTE — ED PROVIDER NOTES
EMERGENCY DEPARTMENT ENCOUNTER            IMPRESSION:  Abdominal pain -likely abdominal wall        MEDICAL DECISION MAKING:  It was my pleasure to provide care for Sudarshan Fierro who presented for evaluation of nominal pain    On my exam patient is pleasant and cooperative.   Vital signs are normal.  Physical exam notable for mid umbilical abdominal tenderness.     Antiacid administered for symptom relief.  No significant improvement    Laboratory investigation independently interpreted from outside clinic are normal including CBC chemistry    CT imaging the abdomen does not show any acute finding.  Imaging independently interpreted by myself and does not show bowel obstruction    Seems pain may be from abdominal wall      Prior to making a final disposition on this patient the results of patient's tests and other diagnostic studies were discussed with the patient. All questions were answered. Patient expressed understanding of the plan and was amenable.    Return precautions and follow-up were discussed.     =================================================================  CHIEF COMPLAINT:  Chief Complaint   Patient presents with    Abdominal Pain         HPI  Sudarshan Fierro is a 24 year old female with a pertinent history of currently breast feeding who presents to the ED by walk-in accompanied by family for evaluation of abdominal pain. Patient endorses 1.5 days of upper abdominal pain. She notes vomiting yesterday. History of  but patient denies other abdominal surgeries.     Patient seen at  today and was sent to [facility] for imaging per patient.     Per chart review: Patient was seen at [facility] on 3/14/24 for abdominal pain. UA showed bacteria in urine without WBCs or dysuria. CBC, CMP, Lipase, and HCG all negative. She was given Maalox without improvement and sent to ED for further evaluation.     REVIEW OF SYSTEMS  Constitutional: Does not report  chills, unintentional weight loss or fatigue   Eyes: Does not report visual changes or discharge    HENT: Does not report sore throat, ear pain or neck pain  Respiratory: Does not report cough or shortness of breath    Cardiovascular: Does not report chest pain, palpitations or leg swelling  GI: Upper abdominal pain, vomiting, Does not report dark, bloody stools.    : Does not report hematuria, dysuria, or flank pain  Musculoskeletal: Does not report any new musculoskeletal pain or new muscle/joint pains  Skin: Does not report rash or wound  Neurologic: Does not report current headache, new weakness, focal weakness, or sensory changes        Remainder of systems reviewed, unless noted in HPI all others negative.      PAST MEDICAL HISTORY:  Past Medical History:   Diagnosis Date    Abnormal Pap smear of cervix 2022    Chlamydia     Gonorrhea        PAST SURGICAL HISTORY:  Past Surgical History:   Procedure Laterality Date     SECTION N/A 2022    Procedure:  SECTION;  Surgeon: Breonna Goncalves MD;  Location: [facility] OR    MOUTH SURGERY           CURRENT MEDICATIONS:    gabapentin (NEURONTIN) 100 MG capsule  levonorgestrel (MIRENA) 20 MCG/DAY IUD        ALLERGIES:  Allergies   Allergen Reactions    Sukh Torres       FAMILY HISTORY:  Family History   Problem Relation Age of Onset    Diabetes Mother     Early Death Mother     Schizophrenia Mother     Alcoholism Mother     Depression Mother     Cerebrovascular Disease Mother     Diabetes Sister     Schizophrenia Sister     Cerebrovascular Disease Maternal Grandfather        SOCIAL HISTORY:   Social History     Socioeconomic History    Marital status: Single     Spouse name: Fidel    Number of children: 1   Tobacco Use    Smoking status: Former     Types: Cigarettes     Quit date: 2022     Years since quittin.0    Smokeless tobacco: Never   Substance and Sexual Activity    Alcohol use: Not Currently     "Drug use: Never    Sexual activity: Yes     Partners: Male     Birth control/protection: None   Social History Narrative    ** Merged History Encounter **          Social Determinants of Health     Financial Resource Strain: High Risk (7/6/2022)    Overall Financial Resource Strain (CARDIA)     Difficulty of Paying Living Expenses: Hard   Food Insecurity: Food Insecurity Present (7/6/2022)    Hunger Vital Sign     Worried About Running Out of Food in the Last Year: Sometimes true     Ran Out of Food in the Last Year: Sometimes true   Transportation Needs: No Transportation Needs (7/6/2022)    PRAPARE - Transportation     Lack of Transportation (Medical): No     Lack of Transportation (Non-Medical): No   Physical Activity: Sufficiently Active (7/6/2022)    Exercise Vital Sign     Days of Exercise per Week: 5 days     Minutes of Exercise per Session: 30 min   Social Connections: Moderately Isolated (7/6/2022)    Social Connection and Isolation Panel [NHANES]     Frequency of Communication with Friends and Family: More than three times a week     Frequency of Social Gatherings with Friends and Family: Once a week     Attends [employer] Services: 1 to 4 times per year     Active Member of Clubs or Organizations: No     Marital Status: Never    Housing Stability: Low Risk  (7/6/2022)    Housing Stability Vital Sign     Unable to Pay for Housing in the Last Year: No     Number of Places Lived in the Last Year: 1     Unstable Housing in the Last Year: No       PHYSICAL EXAM:    /67   Pulse 88   Temp 98.1  F (36.7  C) (Oral)   Resp 18   Ht 1.626 m (5' 4\")   Wt 100 kg (220 lb 8 oz)   LMP 03/01/2024 (Approximate)   SpO2 100%   BMI 37.85 kg/m      Constitutional: Awake, alert,     GI: Tender mid umbilical.  Equally tender with flexion of the abdominal wall muscle      ED COURSE:  9:06 PM I met with the patient to gather history and to perform my initial exam. I discussed the plan for care while in the " Emergency Department.         Medical Decision Making    History:  Supplemental history from: None  External Record(s) reviewed: External medical records including care everywhere reviewed [facility] on 3/14/24    Work Up:  EKG, laboratory and imaging studies as ordered were independently interpreted by myself.       Complicating factors:  Patient has a complicated past medical history including currently breast feeding  Care affected by social determinants of health: Access to primary care    Disposition involved shared decision-making with the patient.        LAB:  Laboratory results were independently reviewed and interpreted  Results for orders placed or performed during the hospital encounter of 03/14/24   CT Abdomen Pelvis w Contrast    Impression    IMPRESSION:   1.  A very small amount of free fluid in the pelvis. This is nonspecific, but could relate to a ruptured ovarian cyst.  2.  No other cause of acute pain identified in the abdomen or pelvis.          RADIOLOGY:  Radiology reports were independently reviewed and interpreted  CT Abdomen Pelvis w Contrast   Final Result   IMPRESSION:    1.  A very small amount of free fluid in the pelvis. This is nonspecific, but could relate to a ruptured ovarian cyst.   2.  No other cause of acute pain identified in the abdomen or pelvis.            MEDICATIONS GIVEN IN THE EMERGENCY:  Medications   sucralfate (CARAFATE) suspension 2 g (2 g Oral $Given 3/14/24 2208)   pantoprazole (PROTONIX) EC tablet 40 mg (40 mg Oral $Given 3/14/24 2206)   iopamidol (ISOVUE-370) solution 85 mL (85 mLs Intravenous $Given 3/14/24 2254)           NEW PRESCRIPTIONS STARTED AT TODAY'S ER VISIT:  New Prescriptions    No medications on file                FINAL DIAGNOSIS:    ICD-10-CM    1. Abdominal wall pain  R10.9                  NAME: Sudarshan Rondontim Rizon  AGE: 24 year old female  YOB: 2000  MRN: [id number]  EVALUATION DATE & TIME: No  admission date for patient encounter.    PCP: Enid Rollins    ED PROVIDER: Virgilio Ritchie M.D.      I, Jay Fuller , am serving as a scribe to document services personally performed by Dr. Virgilio Ritchie based on my observation and the provider's statements to me. I, Virgilio Ritchie MD attest that Jay Fuller  is acting in a scribe capacity, has observed my performance of the services and has documented them in accordance with my direction.    Virgilio Ritchie M.D.  Emergency Medicine  [facility] EMERGENCY DEPARTMENT  [facility] [facility] [address]  [phone]  Dept: [phone]  3/14/2024         Virgilio Ritchie MD  03/15/24 0013

## 2024-03-16 LAB — BACTERIA UR CULT: NORMAL

## 2024-04-01 ENCOUNTER — PATIENT OUTREACH (OUTPATIENT)
Dept: MIDWIFE SERVICES | Facility: CLINIC | Age: 24
End: 2024-04-01
Payer: COMMERCIAL

## 2024-04-29 NOTE — TELEPHONE ENCOUNTER
Patient due for Pap.    Reminder done today via telephone call-spoke to the pt she said that she has the phone number to call and schedule an appt.

## 2024-05-29 NOTE — TELEPHONE ENCOUNTER
Nehemiah Santamaria,   Patient is lost to pap tracking follow-up. Attempts to contact pt have been made per reminder process and there has been no reply and/or no appt scheduled.     Pap Hx:  04/4/22 LSIL Pap Plan pap in 1 year due 04/4/23.   04/11/22 Pt viewed [employer] message with results and recommendations.   04/13/23 NIL Pap Plan pap in 1 year due 04/13/24 04/1/24 Reminder Mychart  04/29/24 Reminder call-spoke to the pt she said that she has the phone number to call and schedule an appt   05/29/24 Lost to follow-up for pap tracking, humaira routed to provider

## 2024-07-14 ENCOUNTER — HEALTH MAINTENANCE LETTER (OUTPATIENT)
Age: 24
End: 2024-07-14

## 2024-08-14 ENCOUNTER — OFFICE VISIT (OUTPATIENT)
Dept: MIDWIFE SERVICES | Facility: CLINIC | Age: 24
End: 2024-08-14
Payer: COMMERCIAL

## 2024-08-14 VITALS
HEIGHT: 64 IN | SYSTOLIC BLOOD PRESSURE: 120 MMHG | HEART RATE: 95 BPM | DIASTOLIC BLOOD PRESSURE: 70 MMHG | BODY MASS INDEX: 34.83 KG/M2 | WEIGHT: 204 LBS

## 2024-08-14 DIAGNOSIS — Z30.432 ENCOUNTER FOR IUD REMOVAL: Primary | ICD-10-CM

## 2024-08-14 DIAGNOSIS — Z30.8 ENCOUNTER FOR OTHER CONTRACEPTIVE MANAGEMENT: ICD-10-CM

## 2024-08-14 DIAGNOSIS — Z12.4 CERVICAL CANCER SCREENING: ICD-10-CM

## 2024-08-14 DIAGNOSIS — Z11.3 SCREENING EXAMINATION FOR STI: ICD-10-CM

## 2024-08-14 DIAGNOSIS — G57.11 MERALGIA PARESTHETICA OF RIGHT SIDE: ICD-10-CM

## 2024-08-14 DIAGNOSIS — Z30.2 REQUEST FOR STERILIZATION: ICD-10-CM

## 2024-08-14 LAB
C TRACH DNA SPEC QL PROBE+SIG AMP: NEGATIVE
N GONORRHOEA DNA SPEC QL NAA+PROBE: NEGATIVE

## 2024-08-14 PROCEDURE — 58301 REMOVE INTRAUTERINE DEVICE: CPT | Performed by: ADVANCED PRACTICE MIDWIFE

## 2024-08-14 PROCEDURE — 87491 CHLMYD TRACH DNA AMP PROBE: CPT | Performed by: ADVANCED PRACTICE MIDWIFE

## 2024-08-14 PROCEDURE — 87591 N.GONORRHOEAE DNA AMP PROB: CPT | Performed by: ADVANCED PRACTICE MIDWIFE

## 2024-08-14 PROCEDURE — 99214 OFFICE O/P EST MOD 30 MIN: CPT | Mod: 25 | Performed by: ADVANCED PRACTICE MIDWIFE

## 2024-08-14 PROCEDURE — G0145 SCR C/V CYTO,THINLAYER,RESCR: HCPCS | Performed by: ADVANCED PRACTICE MIDWIFE

## 2024-08-14 NOTE — PROGRESS NOTES
Office Visit:    Subjective:    Sudarshan is a 24 year old female who presents for Mirena IUD removal.  She shares that she never wants to be pregnant again but does not want to be on hormonal birth control any longer.  She is aware her fertility will return immediately upon IUD removal.  She is currently sexually active with 1 male partner and denies any safety concerns in her relationship.  She would like counseling regarding female sterilization and is open to meeting with Dr. Talamantes to discuss.    She shares that she initiated therapy to address her birth trauma but due to insurance needed to stop.  Continues to think about her birth every day and thinks returning to therapy would be helpful.  Feels most distressed by the emergency , pain during her surgery, and continued symptoms of right leg pain.  She saw neurology last year.  Her symptoms have continued and would be interested in returning for a follow-up visit with neurology.    Chlamydia and gonorrhea screening requested as well as Pap smear which she is due for.    Review of Systems:  Also a 12 point comprehensive review of systems was negative except as noted.     Patient Active Problem List   Diagnosis    Hypovitaminosis D    Cervical cancer screening    Papanicolaou smear of cervix with low grade squamous intraepithelial lesion (LGSIL)    Lactating mother    Encounter for other contraceptive management    Abnormal uterine bleeding    Meralgia paresthetica of right side        Current Outpatient Medications:     levonorgestrel (MIRENA) 20 MCG/DAY IUD, by Intrauterine route once, Disp: , Rfl:     gabapentin (NEURONTIN) 100 MG capsule, TAKE 1 CAPSULE(100 MG) BY MOUTH TWICE DAILY (Patient not taking: Reported on 3/14/2024), Disp: 60 capsule, Rfl: 0     Past Medical History:   Diagnosis Date    Abnormal Pap smear of cervix 2022    Chlamydia     Gonorrhea      Past Surgical History:   Procedure Laterality Date      "SECTION N/A 2022    Procedure:  SECTION;  Surgeon: Breonna Goncalves MD;  Location: [facility] OR    MOUTH SURGERY        Family History   Problem Relation Age of Onset    Diabetes Mother     Early Death Mother     Schizophrenia Mother     Alcoholism Mother     Depression Mother     Cerebrovascular Disease Mother     Diabetes Sister     Schizophrenia Sister     Cerebrovascular Disease Maternal Grandfather      OB History    Para Term  AB Living   1 1 1 0 0 1   SAB IAB Ectopic Multiple Live Births   0 0 0 0 1      # Outcome Date GA Lbr Johnathan/2nd Weight Sex Type Anes PTL Lv   1 Term 22 41w2d  3.969 kg (8 lb 12 oz) M CS-LTranv EPI, Nitrous, IV, Spinal N HORACIO      Name: Elen      Apgar1: 8  Apgar5: 9     Objective:   Vitals:    Vitals:    24 1041   BP: 120/70   BP Location: Right leg   Patient Position: Sitting   Cuff Size: Adult Large   Pulse: 95   Weight: 92.5 kg (204 lb)   Height: 1.626 m (5' 4\")     Physical Exam:  General: Pleasant, articulate, well-groomed, well-nourished female.  Not in any apparent distress.  External genitalia: Normal hair distribution, no lesions.  Urethral opening: Without lesions, or tenderness.   Bladder: Without masses, or tenderness.  Vagina: Pink, rugated, normal-appearing discharge.  Cervix:  pink, smooth, no lesions.  Pap smear obtained.    PROCEDURE:  A speculum was placed and the IUD strings were visualized.  The IUD strings were grasped with a ring forceps and gentle traction was applied.  The IUD was easily removed and was noted to be intact.  The speculum was then removed.    Assessment:  1.) IUD removal   2.) STI screening  3.) Cervical cancer screening  4.) Desires counseling on female sterilization  5.) Birth trauma r/t unplanned emergency  section, pain during surgery and continued pain  6.) Continued right leg pain, Neurology following    Plan:  -Return of fertility with IUD removal discussed.  Patient plans condoms. " Warning signs were reviewed with the patient including bleeding, pain and infection.   -GC/CT, pap smear collected.   -Referral to Dr. Talamantes for sterilization consult.   -Return to [facility] neurology for follow-up.  Referral declined as patient was seen within the last year and was recommended to follow-up with continued symptoms.  -Discussed birth and resulting birth trauma.  Recommended she continue therapy and community referrals reviewed.  Accepts referral for therapy through [facility] and is hoping for virtual visits.    40 minutes spent on the date of the encounter doing chart review, review of test results, patient visit and documentation

## 2024-08-20 LAB
BKR LAB AP GYN ADEQUACY: NORMAL
BKR LAB AP GYN INTERPRETATION: NORMAL
BKR LAB AP HPV REFLEX: NORMAL
BKR LAB AP PREVIOUS ABNORMAL: NORMAL
PATH REPORT.COMMENTS IMP SPEC: NORMAL
PATH REPORT.COMMENTS IMP SPEC: NORMAL
PATH REPORT.RELEVANT HX SPEC: NORMAL

## 2025-01-07 ENCOUNTER — HOSPITAL ENCOUNTER (EMERGENCY)
Facility: HOSPITAL | Age: 25
Discharge: HOME | End: 2025-01-07
Admitting: PHYSICIAN ASSISTANT
Payer: COMMERCIAL

## 2025-01-07 VITALS
RESPIRATION RATE: 16 BRPM | TEMPERATURE: 98.9 F | HEART RATE: 80 BPM | OXYGEN SATURATION: 100 % | BODY MASS INDEX: 35.45 KG/M2 | HEIGHT: 63 IN | SYSTOLIC BLOOD PRESSURE: 118 MMHG | WEIGHT: 200.1 LBS | DIASTOLIC BLOOD PRESSURE: 68 MMHG

## 2025-01-07 DIAGNOSIS — K08.89 PAIN, DENTAL: ICD-10-CM

## 2025-01-07 LAB
ALBUMIN SERPL BCG-MCNC: 4.2 G/DL (ref 3.5–5.2)
ALP SERPL-CCNC: 43 U/L (ref 40–150)
ALT SERPL W P-5'-P-CCNC: 9 U/L (ref 0–50)
ANION GAP SERPL CALCULATED.3IONS-SCNC: 9 MMOL/L (ref 7–15)
APAP SERPL-MCNC: <5 UG/ML (ref 10–30)
AST SERPL W P-5'-P-CCNC: 13 U/L (ref 0–45)
BILIRUB SERPL-MCNC: 0.2 MG/DL
BUN SERPL-MCNC: 4.3 MG/DL (ref 6–20)
CALCIUM SERPL-MCNC: 9.1 MG/DL (ref 8.8–10.4)
CHLORIDE SERPL-SCNC: 103 MMOL/L (ref 98–107)
CREAT SERPL-MCNC: 0.66 MG/DL (ref 0.51–0.95)
EGFRCR SERPLBLD CKD-EPI 2021: >90 ML/MIN/1.73M2
GLUCOSE SERPL-MCNC: 80 MG/DL (ref 70–99)
HCO3 SERPL-SCNC: 26 MMOL/L (ref 22–29)
INR PPP: 1 (ref 0.85–1.15)
POTASSIUM SERPL-SCNC: 3.7 MMOL/L (ref 3.4–5.3)
PROT SERPL-MCNC: 7.5 G/DL (ref 6.4–8.3)
SODIUM SERPL-SCNC: 138 MMOL/L (ref 135–145)

## 2025-01-07 PROCEDURE — 80143 DRUG ASSAY ACETAMINOPHEN: CPT | Performed by: PHYSICIAN ASSISTANT

## 2025-01-07 PROCEDURE — 82310 ASSAY OF CALCIUM: CPT | Performed by: PHYSICIAN ASSISTANT

## 2025-01-07 PROCEDURE — 99283 EMERGENCY DEPT VISIT LOW MDM: CPT | Mod: 25 | Performed by: PHYSICIAN ASSISTANT

## 2025-01-07 PROCEDURE — 85610 PROTHROMBIN TIME: CPT | Performed by: PHYSICIAN ASSISTANT

## 2025-01-07 PROCEDURE — 84155 ASSAY OF PROTEIN SERUM: CPT | Performed by: PHYSICIAN ASSISTANT

## 2025-01-07 PROCEDURE — 36415 COLL VENOUS BLD VENIPUNCTURE: CPT | Performed by: PHYSICIAN ASSISTANT

## 2025-01-07 PROCEDURE — 64400 NJX AA&/STRD TRIGEMINAL NRV: CPT | Performed by: PHYSICIAN ASSISTANT

## 2025-01-07 PROCEDURE — 250N000009 HC RX 250: Performed by: PHYSICIAN ASSISTANT

## 2025-01-07 PROCEDURE — 250N000013 HC RX MED GY IP 250 OP 250 PS 637: Performed by: PHYSICIAN ASSISTANT

## 2025-01-07 RX ORDER — BUPIVACAINE HYDROCHLORIDE AND EPINEPHRINE 5; 5 MG/ML; UG/ML
1.8 INJECTION, SOLUTION PERINEURAL ONCE
Status: COMPLETED | OUTPATIENT
Start: 2025-01-07 | End: 2025-01-07

## 2025-01-07 RX ADMIN — BUPIVACAINE HYDROCHLORIDE AND EPINEPHRINE BITARTRATE 1.8 ML: 5; .005 INJECTION, SOLUTION SUBCUTANEOUS at 18:50

## 2025-01-07 RX ADMIN — AMOXICILLIN AND CLAVULANATE POTASSIUM 1 TABLET: 875; 125 TABLET, FILM COATED ORAL at 20:18

## 2025-01-07 ASSESSMENT — ACTIVITIES OF DAILY LIVING (ADL)
ADLS_ACUITY_SCORE: 46

## 2025-01-07 ASSESSMENT — COLUMBIA-SUICIDE SEVERITY RATING SCALE - C-SSRS
2. HAVE YOU ACTUALLY HAD ANY THOUGHTS OF KILLING YOURSELF IN THE PAST MONTH?: NO
6. HAVE YOU EVER DONE ANYTHING, STARTED TO DO ANYTHING, OR PREPARED TO DO ANYTHING TO END YOUR LIFE?: NO
1. IN THE PAST MONTH, HAVE YOU WISHED YOU WERE DEAD OR WISHED YOU COULD GO TO SLEEP AND NOT WAKE UP?: NO

## 2025-01-07 NOTE — ED PROVIDER NOTES
Emergency Department Encounter   NAME: Sudarshan Fierro ; AGE: 24 year old female ; YOB: 2000 ; MRN: [id number] ; PCP: Enid Rollins   ED PROVIDER: Amanda Montoya PA-C    Evaluation Date & Time:   1/7/2025  5:17 PM    CHIEF COMPLAINT:  Dental Pain      Impression and Plan   MDM: Sudarshan Fierro is a 24 year old female who presents to the ED for evaluation of dental pain.  The patient presents to the emergency department for evaluation of 1 week of dental pain for which she has seen the [place] [facility], and was referred to endodontist for definitive treatment though unfortunately was unable to make an appointment until April.  Here in the ED, she is generally well-appearing, very pleasant, and in no acute distress.  Afebrile and vitally stable.  Tooth #30 has old filling, and is tender to palpation with tenderness to the surrounding gingiva though no evidence of obvious periapical abscess.  No facial or buccal tenderness or induration, no facial swelling or erythema.  Nothing to suggest buccal cellulitis or parotitis.  No sublingual swelling or tenderness.,  No submandibular fullness -no concern at this time for Jeffrey angina.  No indication for advanced imaging.  Patient may have an early infection and we discussed treatment with a course of Augmentin.  First dose given in the emergency department and medication side effects discussed.  I did place an online referral to the [facility] as she states her clinic did not do so and they were unable to see her until this was placed.  Dental block administered here in the ED with minimal pain relief.  This was followed by IM Toradol with some improvement.  Of greater concern, she does report taking large doses of ibuprofen.  She is not exceeding her Tylenol dosing in a 24-hour.  Though we did discuss proper dosing.  Hepatic panel, INR, and Tylenol level reassuring.  No MEGHNA.  We did discuss the importance of  overmedicating with Tylenol and Motrin and the risks of doing so and she verbalized understanding.  We will switch her from ibuprofen to naproxen for less frequent and easier dosing and she was instructed to not take these at the same time.  We reviewed concerning signs and symptoms to return to the ED and importance of follow-up with dentistry.  I also provided her with a list of other clinics in the area to try to secure sooner follow up.     Medical Decision Making  Obtained supplemental history:Supplemental history obtained?: No  Reviewed external records: External records reviewed?: Documented in chart and Other:   Care impacted by chronic illness:Documented in Chart  Did you consider but not order tests?: Work up considered but not performed and documented in chart, if applicable  Did you interpret images independently?: Independent interpretation of ECG and images noted in documentation, when applicable.  Consultation discussion with other provider:Did you involve another provider (consultant, MH, pharmacy, etc.)?: No  Discharge. No recommendations on prescription strength medication(s). N/A.    MIPS: Dental Pain:MDM for Pain Management: No opiates were prescribed as recommended by ACEP to reduce patient harm related to the opioid crisis.      ED COURSE:  5:46 PM I met and introduced myself to the patient. I gathered initial history and performed my physical exam. We discussed plan for initial workup.   6:53 PM I performed dental block.   8:06 PM I rechecked the patient and discussed results, discharge, follow up, and reasons to return to the ED.     At the conclusion of the encounter I discussed the results of all the tests and the disposition. The questions were answered. The patient or family acknowledged understanding and was agreeable with the care plan.    FINAL IMPRESSION:    ICD-10-CM    1. Pain, dental  K08.89             MEDICATIONS GIVEN IN THE EMERGENCY DEPARTMENT:  Medications   BUPivacaine  "0.5 % EPINEPHrine 1:200,000 dental injection SOLN 1.8 mL (1.8 mLs Intradermal $Given by Other Clinician 25 1850)   amoxicillin-clavulanate (AUGMENTIN) 875-125 MG per tablet 1 tablet (1 tablet Oral $Given 25)         NEW PRESCRIPTIONS STARTED AT TODAY'S ED VISIT:  Discharge Medication List as of 2025  8:24 PM            HPI   Use of Intrepreter: N/A     Sudarshan SCHMIDT Charmaine Fierro is a 24 year old female with no known pertinent medical history who presents to the ED by walk in for evaluation of dental pain.     The patient reports that she has been having ongoing pain in her #30 tooth on the lower right side. She said there was a \"hole\" in her tooth, food got trapped and she removed it with floss, but then developed pain afterwards. For pain management, she has been taking one 500 mg tylenol every three hours and five 200 mg ibuprofen every 1-2 hours, without much relief. Due to the pain, she has been having trouble sleeping, eating and working. She said that she has a referral to see an endodontist, but has not had any luck trying to schedule an appointment, as they're booked. Patient is able to tolerate secretions. She noted that her tooth was bleeding this morning.     Patient denies fevers or recent use of antibiotics. No specific throat pain. No use of daily medications or health problems or allergies to medications.     1/3/2025- Nurse triage call for dental pain. Patient reported taking tylenol and ibuprofen for pain, as well as having a referral to the  of  school of dentistry. Patient encouraged to speak with dental resident on call service for further care.       REVIEW OF SYSTEMS:  Pertinent positive and negative symptoms per HPI.       Medical History     Past Medical History:   Diagnosis Date    Abnormal Pap smear of cervix 2022    Chlamydia     Gonorrhea        Past Surgical History:   Procedure Laterality Date     SECTION N/A 2022    Procedure:  " "SECTION;  Surgeon: Breonna Goncalves MD;  Location: [facility] OR    MOUTH SURGERY         Family History   Problem Relation Age of Onset    Diabetes Mother     Early Death Mother     Schizophrenia Mother     Alcoholism Mother     Depression Mother     Cerebrovascular Disease Mother     Diabetes Sister     Schizophrenia Sister     Cerebrovascular Disease Maternal Grandfather        Social History     Tobacco Use    Smoking status: Former     Current packs/day: 0.00     Types: Cigarettes     Quit date: 2022     Years since quittin.8    Smokeless tobacco: Never   Substance Use Topics    Alcohol use: Not Currently    Drug use: Never       amoxicillin-clavulanate (AUGMENTIN) 875-125 MG tablet  gabapentin (NEURONTIN) 100 MG capsule  levonorgestrel (MIRENA) 20 MCG/DAY IUD          Physical Exam     First Vitals:  Patient Vitals for the past 24 hrs:   BP Temp Temp src Pulse Resp SpO2 Height Weight   25 118/68 -- -- 80 -- 100 % -- --   25 1830 122/77 -- -- 81 -- 97 % -- --   25 1713 -- 98.9  F (37.2  C) Oral -- -- -- -- --   25 1711 (!) 130/100 -- -- 86 16 98 % 1.6 m (5' 3\") 90.8 kg (200 lb 1.6 oz)         PHYSICAL EXAM:   Physical Exam  Vitals reviewed.   Constitutional:       General: She is not in acute distress.     Appearance: She is not ill-appearing, toxic-appearing or diaphoretic.   HENT:      Mouth/Throat:      Mouth: Mucous membranes are moist.      Pharynx: Oropharynx is clear.      Comments: Posterior oropharynx is clear.  No facial swelling, erythema, or induration.  No submandibular fullness.  Normal phonation.  Tolerating secretions.  No trismus.  No sublingual swelling or tenderness.  Right lower molar, tooth #30, has old filling in place and is tender to tooth and along gum tissue.  No focal fluctuance.  Musculoskeletal:      Cervical back: Normal range of motion and neck supple.   Neurological:      Mental Status: She is alert.             Results     LAB:  All " pertinent labs reviewed and interpreted  Labs Ordered and Resulted from Time of ED Arrival to Time of ED Departure   COMPREHENSIVE METABOLIC PANEL - Abnormal       Result Value    Sodium 138      Potassium 3.7      Carbon Dioxide (CO2) 26      Anion Gap 9      Urea Nitrogen 4.3 (*)     Creatinine 0.66      GFR Estimate >90      Calcium 9.1      Chloride 103      Glucose 80      Alkaline Phosphatase 43      AST 13      ALT 9      Protein Total 7.5      Albumin 4.2      Bilirubin Total 0.2     ACETAMINOPHEN LEVEL - Abnormal    Acetaminophen <5.0 (*)    INR - Normal    INR 1.00         RADIOLOGY:  No orders to display         ECG:  N/A    PROCEDURES:  PROCEDURE: Dental Nerve Block   INDICATIONS: Dental pain   PROCEDURE PROVIDER: Patricia Montoya PA-C   SITE: Right Inferior Alveolar (mandibular) Nerve to achieve anesthesia of Tooth #30     MEDICATION: 1.8 mL of 0.5% Bupivacaine with epinephrine   NOTE: The usual mandibular landmarks were identified and the needle was positioned at the midpoint of the mandibular borders.  Area was aspirated and there was no return of blood.  I then injected the medication into the site.    COMPLICATIONS: Patient tolerated procedure well, without complication          I, Anjali Zee, am serving as a scribe to document services personally performed by Amanda Montoya PA-C, based on my observation and the provider's statements to me. I, Amanda Montoya PA-C attest that Anjali Zee is acting in a scribe capacity, has observed my performance of the services and has documented them in accordance with my direction.       Amanda Montoya PA-C   Emergency Medicine   [facility] EMERGENCY DEPARTMENT       Amanda Montoya PA-C  01/07/25 7204

## 2025-01-07 NOTE — ED TRIAGE NOTES
Patient arrives ambulatory to triage from home.   Was seen for tooth pain at dentist and told she needs a root canal but unable to schedule till May.   Here for pain control, reports otc meds not relieving symptoms.

## 2025-01-08 ENCOUNTER — TELEPHONE (OUTPATIENT)
Dept: EMERGENCY MEDICINE | Facility: HOSPITAL | Age: 25
End: 2025-01-08
Payer: COMMERCIAL

## 2025-01-08 RX ORDER — NAPROXEN 500 MG/1
500 TABLET ORAL 2 TIMES DAILY WITH MEALS
Qty: 24 TABLET | Refills: 0 | Status: SHIPPED | OUTPATIENT
Start: 2025-01-08 | End: 2025-01-20

## 2025-01-08 NOTE — DISCHARGE INSTRUCTIONS
You were seen at the Hospital Emergency Department. Please bring this paper work to your followup appointment for the next provider to review and continue providing your care.  We believe that an infected tooth is the cause of your dental pain. For your infection, please take the antibiotic Augmentin as prescribed. Ifyour pain is not improved in 2 days with this therapy, please see a dentist. There is a list of several low/no cost dental clinics below. If you develop a fever, facial swelling, worsening pain, difficulty breathing or swallowing please return to the ER for further evaluation.  Take Tylenol or Ibuprofen for pain. Do not take more than:  Tylenol 1000 mgevery 6 hours (Max of 4000 mg per day)  Naproxen 500mg twice per day.   Please take your medicines as recommended above and review the discharge instructions for concerning signs/symptoms thatwould require your prompt return to the emergency department for further evaluation. Please follow up in clinic as we have recommended below. If your symptoms worsen prior to your follow up appointment, do not hesitate toreturn here to the emergency department for further evaluation. We'd be happy to see you again.   ---------------------------------------------------------------------------------------------------  Dental Clinics with no or reduced fees  [facility]   The Dental Emergency Room  [address] [facility] [address], [facility]  [phone] Accepts [place]    [facility]  [address], [facility]  [phone] No Fee Hours and services vary each month - call them before going. Sometimes only offer extractions.   [facility]note 3[facility] Dental  [address], [facility]  [phone] Sliding fee: ER visit - $50 up front  regular - $35 up front Call or arrive at 7:45 AM on Mondays, Tues, or Thursdays to sign up for same-dayappointments for dental pain / emergencies.        [facility] Sliding fee  Call fordetails Walk-ins and same-day  appointments  Walk-in's accepted 8-11AM and 1-4PM  Monday-Friday   [address]     [phone]          [facility] dental Sliding fee If no insurance, call first.    [address], [facility]     [phone]          [facility]  [address], [facility]  [phone] Sliding fee Call 8:00 AM Mon-Thurs for next-day  appointments (for emergencies/pain only)Help with [place]are paperwork is available.        [facility]  [address] [facility]  [phone] Call for fees Only for adult dental emergencies. Costs about 30% less than private practiceclinics  To sign up for the regular dental clinic, call [phone].        [facility])  [address]  [phone] Sliding fee scale For people without dental insurance only.Cleaning, xray, exams, fillings, sealants only - no extractions or root canals, etc. No walk-ins.        [address]  [phone] No Fee No walk-ins, not accepting people with insurance. Extractions for uninsured people only. Call Friday 2PM to get on next week's list        [facility]   [address] [facility]  [phone] Sliding fee  $40 up front No walk-ins. Call at 8AM Mon-Fri for same- day visits. Can schedule Saturday emergency visits by calling Friday morning.   [facility]  [address] [facility]  [phone] Sliding fee  Call for details No walk-ins. For emergency appointments:  Callon Thursday 3PM (for Friday appt) OR Call on Friday 3PM (for Monday appt)        [facility]  [address] [place] [address]  [phone] Sliding fee -   Call for details Call on Tuesdays at 3PMto get an urgent Weds. appointment. Call anytime during business hours to schedule other appointments.

## 2025-05-14 ENCOUNTER — OFFICE VISIT (OUTPATIENT)
Dept: MIDWIFE SERVICES | Facility: CLINIC | Age: 25
End: 2025-05-14
Payer: COMMERCIAL

## 2025-05-14 VITALS
WEIGHT: 195 LBS | HEIGHT: 63 IN | BODY MASS INDEX: 34.55 KG/M2 | DIASTOLIC BLOOD PRESSURE: 68 MMHG | SYSTOLIC BLOOD PRESSURE: 112 MMHG

## 2025-05-14 DIAGNOSIS — Z00.00 ROUTINE HEALTH MAINTENANCE: Primary | ICD-10-CM

## 2025-05-14 DIAGNOSIS — G57.11 MERALGIA PARESTHETICA OF RIGHT SIDE: ICD-10-CM

## 2025-05-14 PROCEDURE — 87491 CHLMYD TRACH DNA AMP PROBE: CPT | Performed by: ADVANCED PRACTICE MIDWIFE

## 2025-05-14 PROCEDURE — 99395 PREV VISIT EST AGE 18-39: CPT | Performed by: ADVANCED PRACTICE MIDWIFE

## 2025-05-14 PROCEDURE — 3078F DIAST BP <80 MM HG: CPT | Performed by: ADVANCED PRACTICE MIDWIFE

## 2025-05-14 PROCEDURE — 87591 N.GONORRHOEAE DNA AMP PROB: CPT | Performed by: ADVANCED PRACTICE MIDWIFE

## 2025-05-14 PROCEDURE — 99212 OFFICE O/P EST SF 10 MIN: CPT | Mod: 25 | Performed by: ADVANCED PRACTICE MIDWIFE

## 2025-05-14 PROCEDURE — 3074F SYST BP LT 130 MM HG: CPT | Performed by: ADVANCED PRACTICE MIDWIFE

## 2025-05-14 ASSESSMENT — ANXIETY QUESTIONNAIRES
3. WORRYING TOO MUCH ABOUT DIFFERENT THINGS: SEVERAL DAYS
8. IF YOU CHECKED OFF ANY PROBLEMS, HOW DIFFICULT HAVE THESE MADE IT FOR YOU TO DO YOUR WORK, TAKE CARE OF THINGS AT HOME, OR GET ALONG WITH OTHER PEOPLE?: SOMEWHAT DIFFICULT
IF YOU CHECKED OFF ANY PROBLEMS ON THIS QUESTIONNAIRE, HOW DIFFICULT HAVE THESE PROBLEMS MADE IT FOR YOU TO DO YOUR WORK, TAKE CARE OF THINGS AT HOME, OR GET ALONG WITH OTHER PEOPLE: SOMEWHAT DIFFICULT
2. NOT BEING ABLE TO STOP OR CONTROL WORRYING: SEVERAL DAYS
GAD7 TOTAL SCORE: 10
4. TROUBLE RELAXING: MORE THAN HALF THE DAYS
6. BECOMING EASILY ANNOYED OR IRRITABLE: MORE THAN HALF THE DAYS
1. FEELING NERVOUS, ANXIOUS, OR ON EDGE: SEVERAL DAYS
GAD7 TOTAL SCORE: 10
7. FEELING AFRAID AS IF SOMETHING AWFUL MIGHT HAPPEN: SEVERAL DAYS
GAD7 TOTAL SCORE: 10
7. FEELING AFRAID AS IF SOMETHING AWFUL MIGHT HAPPEN: SEVERAL DAYS
5. BEING SO RESTLESS THAT IT IS HARD TO SIT STILL: MORE THAN HALF THE DAYS

## 2025-05-14 NOTE — PROGRESS NOTES
Answers submitted by the patient for this visit:  Patient Health Questionnaire (G7) (Submitted on 2025)  JACKSON 7 TOTAL SCORE: 10      Routine Preventative Health Visit    Subjective:     Sudarshan is a 25 year old female who presents for a preventative health visit.  She is a former patient of [facility].    Additional concern today include:  - She is ready to pursue neurology referrals as well as referral to mental health to deal with her birth trauma.  Does not feel like she was in a space last year to proceed with pursuing either of these but would like to now.    Health Habits:  Exercise: No.    Diet: Trys to eat balanced  Safety (seatbelt, gun access, IPV, hx abuse):  denies all  Tobacco/Alcohol/Drug Use: 0/0-1 per week/never  Anxiety Depression Screening: PHQ- 4, JACKSON-7: 10  Self Breast Exam Monthly:No    Obstetric and Gynecologic History  No LMP recorded (lmp unknown).  She states her cycles occur every month but does not specifically keep track of the dates.    Sexual Partners: 1 male.  Mutually monogamous with her boyfriend/the father of her son.  Current contraception: Not using any form of contraception at this time.  Pregnancy is not desired.  Had IUD removed last year.    Last Pap: 2024.  Results were: normal..       OB History    Para Term  AB Living   1 1 1 0 0 1   SAB IAB Ectopic Multiple Live Births   0 0 0 0 1      # Outcome Date GA Lbr Johnathan/2nd Weight Sex Type Anes PTL Lv   1 Term 22 41w2d  3.969 kg (8 lb 12 oz) M CS-LTranv EPI, Nitrous, IV, Spinal N HORACIO      Name: Elen      Apgar1: 8  Apgar5: 9       Current Medications  Current Outpatient Medications   Medication Sig Dispense Refill    gabapentin (NEURONTIN) 100 MG capsule TAKE 1 CAPSULE(100 MG) BY MOUTH TWICE DAILY (Patient not taking: Reported on 2025) 60 capsule 0       Allergies  Jojoba    Past Medical/Surgical History  Past Medical History:   Diagnosis Date    Abnormal Pap smear of cervix  2022    Chlamydia     Gonorrhea      Past Surgical History:   Procedure Laterality Date     SECTION N/A 2022    Procedure:  SECTION;  Surgeon: Breonna Goncalves MD;  Location: [facility] OR    MOUTH SURGERY         Family History  Family History   Problem Relation Age of Onset    Diabetes Mother     Early Death Mother     Schizophrenia Mother     Alcoholism Mother     Depression Mother     Cerebrovascular Disease Mother     Diabetes Sister     Schizophrenia Sister     Cerebrovascular Disease Maternal Grandfather        Social History  Social History     Socioeconomic History    Marital status: Single     Spouse name: Fidel    Number of children: 1    Years of education: Not on file    Highest education level: Not on file   Occupational History    Not on file   Tobacco Use    Smoking status: Former     Current packs/day: 0.00     Types: Cigarettes     Quit date: 2022     Years since quitting: 3.2    Smokeless tobacco: Never   Substance and Sexual Activity    Alcohol use: Not Currently    Drug use: Never    Sexual activity: Yes     Partners: Male     Birth control/protection: None   Other Topics Concern    Not on file   Social History Narrative    ** Merged History Encounter **          Social Drivers of Health     Financial Resource Strain: High Risk (2022)    Overall Financial Resource Strain (CARDIA)     Difficulty of Paying Living Expenses: Hard   Food Insecurity: Food Insecurity Present (2022)    Hunger Vital Sign     Worried About Running Out of Food in the Last Year: Sometimes true     Ran Out of Food in the Last Year: Sometimes true   Transportation Needs: No Transportation Needs (2022)    PRAPARE - Transportation     Lack of Transportation (Medical): No     Lack of Transportation (Non-Medical): No   Physical Activity: Sufficiently Active (2022)    Exercise Vital Sign     Days of Exercise per Week: 5 days     Minutes of Exercise per  "Session: 30 min   Stress: Not on file   Social Connections: Moderately Isolated (2022)    Social Connection and Isolation Panel [NHANES]     Frequency of Communication with Friends and Family: More than three times a week     Frequency of Social Gatherings with Friends and Family: Once a week     Attends Adventism Services: 1 to 4 times per year     Active Member of Clubs or Organizations: No     Attends Club or Organization Meetings: Not on file     Marital Status: Never    Interpersonal Safety: Not on file   Housing Stability: Low Risk  (2022)    Housing Stability Vital Sign     Unable to Pay for Housing in the Last Year: No     Number of Places Lived in the Last Year: 1     Unstable Housing in the Last Year: No       Review of Systems  A 12 point comprehensive review of systems was negative except as noted.    Objective:        Vitals:    25 1339   BP: 112/68   Weight: 88.5 kg (195 lb)   Height: 1.594 m (5' 2.75\")       Physical Exam:  General Appearance: Alert, cooperative, no distress, appears stated age  Skin: Skin color, texture, turgor normal, no rashes or lesions.   Throat: Lips, mucosa, and tongue normal; teeth and gums normal  HEENT: grossly normal; otoscopic and opthalmic exam not performed.   Neck: Supple, symmetrical, trachea midline, no adenopathy;  thyroid: not enlarged, symmetric, no tenderness/mass/nodules  Lungs: Clear to auscultation bilaterally, respirations unlabored  Breasts: No breast masses, tenderness, asymmetry, or nipple discharge.    Heart: Regular rate and rhythm, S1 and S2 normal, no murmur, rub, or gallop  Abdomen: Soft, non-tender. No organomegaly or masses  Pelvic: not indicated    Assessment / Plan:   Preventative Health Visit  Contraceptive counseling  Birth trauma r/t unplanned emergency  section, pain during surgery and continued pain  Continued right leg pain     -Preventive health info shared via AVS.  -Next pap and HPV test due 2027.  -Labs " today include GC/CT.  -Breast awareness reviewed and patient encouraged to contact her provider with concerns. Disc starting annual screening mammograms at age 40.   -Contraception: nothing. Discussed contraceptive options and risk for unintended pregnancy.. Safe sex practices discussed.   - Desires a referral to return to [facility] neurology for follow-up due to continued right leg symptoms. Referral entered.   -Discussed birth trauma and anxiety.  Accepts referral for therapy through [facility]-  specialty.   -RTC 1 year for preventative health visit, PRN    Time spent on problems in addition to preventive visit as noted above, gathering history, examining, diagnosing and charting = 15  minutes.     Re Nayak, APRN, CNM, IBCLC  [facility]  Midwifery

## 2025-05-15 ENCOUNTER — RESULTS FOLLOW-UP (OUTPATIENT)
Dept: MIDWIFE SERVICES | Facility: CLINIC | Age: 25
End: 2025-05-15

## 2025-05-15 ENCOUNTER — PATIENT OUTREACH (OUTPATIENT)
Dept: CARE COORDINATION | Facility: CLINIC | Age: 25
End: 2025-05-15
Payer: COMMERCIAL

## 2025-05-15 LAB
C TRACH DNA SPEC QL NAA+PROBE: NEGATIVE
N GONORRHOEA DNA SPEC QL NAA+PROBE: NEGATIVE
SPECIMEN TYPE: NORMAL
SPECIMEN TYPE: NORMAL

## 2025-05-19 ENCOUNTER — PATIENT OUTREACH (OUTPATIENT)
Dept: CARE COORDINATION | Facility: CLINIC | Age: 25
End: 2025-05-19
Payer: COMMERCIAL

## 2025-06-17 ENCOUNTER — PRE VISIT (OUTPATIENT)
Dept: PSYCHIATRY | Facility: CLINIC | Age: 25
End: 2025-06-17
Payer: COMMERCIAL

## 2025-06-17 NOTE — TELEPHONE ENCOUNTER
PSYCHIATRY CLINIC PHONE INTAKE     SERVICES REQUESTED / INTERESTED IN          Therapy    Presenting Problem and Brief History                              What would you like to be seen for? (brief description):  Patient would like to speak with someone about their whole postpartum experience. Fatigue, depression, anxiety. Patient had a traumatic birth experience a couple of years ago. Patient stated they are a little nervous about medications at this time and declined scheduling for medication management.  Have you received a mental health diagnosis? 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  Is there any history of developmental delay?  No   Are you currently seeing a mental health provider?  No            Who / month last seen:  N/A  Do you have mental health records elsewhere?  No  Will you sign a release so we can obtain them?  N/A    Have you ever been hospitalized for psychiatric reasons?  No  Describe:  N/A    Do you have current thoughts of self-harm?  No    Do you currently have thoughts of harming others?  No    Do you have any safety concerns? No   If yes to these, offer to reach out to a  for follow up.      Substance Use History     Do you have any history of alcohol or other substance use?  Yes  Describe:  Alcohol and tobacco use. Has not used tobacco since before giving birth  Have you ever received treatment for this?  No    Describe:  N/A     Social History     Who is the patient's a guardian?  Self    Name / number: Octavio Fierro  Have you had an ACT team in last 12 months?  No  Describe: N/A   OK to leave a detailed voicemail?  Yes    Would you be interested in learning more about research opportunities for which you or your child may qualify? We can connect you with a team member for more information.  If yes, send an inbasket message to Tricia Mckeon    Medical/ Surgical History                                    Patient Active Problem List   Diagnosis    Hypovitaminosis D    Cervical cancer screening    Papanicolaou smear of cervix with low grade squamous intraepithelial lesion (LGSIL)    Lactating mother    Encounter for other contraceptive management    Abnormal uterine bleeding    Meralgia paresthetica of right side    Routine health maintenance          Medications             Have you taken >3 psychiatric medications in your past?  No  Do you currently take 5 or more medications, including prescriptions, supplements, and other over the counter products?  No    If YES to at least one of these questions:   As part of your evaluation in our clinic, we have specially trained pharmacists as part of your care team. Your provider would like for you to meet with one of our pharmacists to review your current and past medications, ensure your med list is up to date, and queue up any questions or concerns you have about medications. They will review all of your medications, not just for mental health, to help ensure you know what you re taking and that everything is working together.     Please schedule patient in [facility] (Sienna Francis or Amanda Mcknight) for 60m MTM in any green space as virtual (video), telephone, or in person (designated in person days per Epic templates).  -Appt notes can say  Psych eval on xx/xx   -Route telephone encounter to the pharmacist who will be seeing the patient.  If patient has questions about insurance coverage or billing, please still schedule the visit and refer them to call the [facility] coordinators at [phone].    Current Outpatient Medications   Medication Sig Dispense Refill    gabapentin (NEURONTIN) 100 MG capsule TAKE 1 CAPSULE(100 MG) BY MOUTH TWICE DAILY (Patient not taking: Reported on 5/14/2025) 60 capsule 0         DISPOSITION      6/17/25 Phone Screen. Patient scheduled for DA with Dr. Ramya Brandon on Thursday, July 3rd at 9am. New patient packet  emailed to patient on file.  Ye[facility] De Dios, Complex

## 2025-06-30 ENCOUNTER — HOSPITAL ENCOUNTER (OUTPATIENT)
Dept: ULTRASOUND IMAGING | Facility: HOSPITAL | Age: 25
Discharge: HOME | End: 2025-06-30
Payer: COMMERCIAL

## 2025-06-30 DIAGNOSIS — O36.80X0 PREGNANCY WITH INCONCLUSIVE FETAL VIABILITY: ICD-10-CM

## 2025-06-30 PROCEDURE — 76801 OB US < 14 WKS SINGLE FETUS: CPT

## 2025-06-30 NOTE — PROGRESS NOTES
2025     LMP 5/15/2025 - 6w1d     Pre-Medication  Assessment:     Sudarshan Fierro     When was the first day of your last period? 5/15/2025 Patient is sure of LMP date.     When was your positive pregnancy test? 2025        Was the test more than 52 days ago (before 25)? No     Were you using hormonal birth control, an IUD or emergency contraception when you got pregnant? No     Have you ever been diagnosed with an ectopic pregnancy? No     Have you ever had a tubal ligation or sterilization procedure? No     Have you ever been diagnosed with pelvic inflammatory disease? No     Are you having one-sided pelvic pain? Left lower back pain; twinges of pain in the c/s scar     How long are your typical menstrual cycles /  How many days from the start of one period to the start of the next one?  Unsure but regular      During the past 3 months, has the time between periods varied from your typical cycles by more than a few days? Yes, cycles have been irregular.  ULTRASOUND NEEDED     Was LMP more than 10 weeks (70 days) ago (before 25)? No     Did your last period start earlier or later than you would have expected? YES - ULTRASOUND NEEDED Later     Was your last period shorter than usual? No     Was the amount of bleeding/cramping in your last period normal for you? Yes     Have you had any other recent bleeding that was not normal for you? No        Have you ever been diagnosed with:     An allergy or intolerance to mifepristone or misoprostol?  No     Chronic renal failure?  No     Hemorrhagic disorders? No     Inherited porphyria? No     Have you used systemic corticosteroid therapy long term?  No     Are you currently on anticoagulation therapy (excluding aspirin)?  No     Based on the responses given by the patient, an ultrasound is indicated.     Patient denies all contraindications, will proceed with scheduling medication termination of pregnancy appointment and ultrasound  appointment.        Isela Pandya RN       Subjective:  Sudarshan Fierro is a 25 year old  at   7w3d weeks gestation here today for medication .  Patient has been counseled on pregnancy options and desires medical termination of pregnancy.       Objective:  Dating:  Patient's last menstrual period was 05/15/2025.  Results for orders placed or performed during the hospital encounter of 25   US OB < 14 Weeks Single    Narrative    EXAM: US OB < 14 WEEKS SINGLE-TRANSABDOMINAL  LOCATION: [facility]  DATE: 2025    INDICATION:  Pregnancy with inconclusive fetal viability  COMPARISON: Pelvic ultrasound 2023.  TECHNIQUE: Transabdominal scans were performed. Endovaginal ultrasound was performed to better visualize the embryo.    FINDINGS:  UTERUS: Single normal appearing intrauterine gestational sac containing a single living embryo and a yolk sac.  CRL: Measures 0.98 cm, equals 7 weeks and 1 day.  RATE OF CARDIAC ACTIVITY: 144 bpm.  AMNIOTIC FLUID: Normal.  PLACENTA: Not yet formed. Small subchorionic hematoma has components measuring 18 x 9 x 5 mm on the right and 12 x 10 x 7 mm on the left.    RIGHT OVARY: Normal.  LEFT OVARY: Corpus luteum. Otherwise normal.      Impression    IMPRESSION:   1.  Single intrauterine gestation with cardiac activity at 7 weeks and 1 day, with EDC of 2/15/2026.  2.  Small subchorionic hematoma.             OB History    Para Term  AB Living   2 1 1 0 0 1   SAB IAB Ectopic Multiple Live Births   0 0 0 0 1      # Outcome Date GA Lbr Johnathan/2nd Weight Sex Type Anes PTL Lv   2 Current            1 Term 22 41w2d  3.969 kg (8 lb 12 oz) M CS-LTranv EPI, Nitrous, IV, Spinal N HORACIO      Name: Elen      Apgar1: 8  Apgar5: 9        Past Medical History:   Diagnosis Date    Abnormal Pap smear of cervix 2022    Chlamydia     Gonorrhea        Past Surgical History:   Procedure Laterality Date     " SECTION N/A 2022    Procedure:  SECTION;  Surgeon: Breonna Goncalves MD;  Location: [facility] OR    MOUTH SURGERY         Current Outpatient Medications   Medication Sig Dispense Refill    gabapentin (NEURONTIN) 100 MG capsule TAKE 1 CAPSULE(100 MG) BY MOUTH TWICE DAILY (Patient not taking: Reported on 2025) 60 capsule 0     No current facility-administered medications for this visit.       Allergies   Allergen Reactions    Sukh Torres       Social History     Tobacco Use    Smoking status: Former     Current packs/day: 0.00     Types: Cigarettes     Quit date: 2022     Years since quitting: 3.3    Smokeless tobacco: Never   Substance Use Topics    Alcohol use: Not Currently    Drug use: Never        There were no vitals filed for this visit.    Estimated body mass index is 34.82 kg/m  as calculated from the following:    Height as of 25: 1.594 m (5' 2.75\").    Weight as of 25: 88.5 kg (195 lb).     Gen: well-appearing, cooperative, well-groomed      Assessment and Plan:  Sudarshan Fierro is a 25 year old  at 7w3d weeks gestation as dated by Previous US who desires termination of pregnancy with medication .     Based the pre-medication  ultrasound assessment, ultrasound prior to  IS indicated.     The patient is appropriate for medication  with:  mifepristone and misoprostol.     Prior to the administration/prescribing of mifepristone, the patient received the medication guide and signed the patient agreement.      Mifepristone administered in clinic. Patient plans to take misoprostol by vaginal route.     .    Counseled patient on   - expected bleeding: Bleeding typically starts 2-4 hours after misoprostol and can be heavy for up to 2 hours. Once pregnancy tissue passes, bleeding should slow down to menstrual type flow but can last up to 2 weeks. Patient counseled to contact our clinic or present to Emergency " Department in the case of heavy bleeding (soaking more than two maxi pads per hour for 2 consecutive hours). Also counseled to contact clinic if no bleeding within 24 hours after taking misoprostol.  - pain: Counseled patient that the most severe pain occurs approximately 2-4 hours after misoprostol use and lasts 1-2 hours. Advised patient to take ibuprofen 800 mg with misoprostol and repeat as needed every 8 hours. Patient may also alternate with acetaminophen for added pain control (acetaminophen 650 mg every 6 hours).   - potential side effects of misoprostol: nausea, vomiting, diarrhea, headache, dizziness, and thermoregulatory effects such as fever, warmth, hot flushes, or chills  - follow up plan options:   Home Pregnancy Test - Take a urine pregnancy test four weeks after taking  medication(s).  Blood Test - Get a blood test on the day of your appointment or the day you take your first dose of medication to measure the amount of pregnancy hormone (HCG) in your blood.  Get another HCG blood test 1-2 weeks after taking  medication(s).   Ultrasound - Get a vaginal ultrasound 1-2 weeks after taking  medication(s).  Patient plans two blood tests for follow up.  Also offered to discuss any contraceptive needs/plans with the patient today. Patient plans vasectomy or tubal.    Chart routed to RN team (TOP Triage [id number]) who will follow up with patient via telephone within 1 week after clinic visit to assess patient.      Marielle Cedeno PA-C

## 2025-06-30 NOTE — PATIENT INSTRUCTIONS
MEDICATION TERMINATION OF PREGNANCY USING MIFEPRISTONE AND MISOPROSTOL    HOW DOES IT WORK?    Mifepristone and misoprostol together are medications that can be taken to end your pregnancy.      HOW WELL DOES THE MEDICATION WORK?    Medication  is highly effective. Medication  is highly effective; it has a success rate of >95% using the standard protocol. Medication  is safe. Serious complications requiring hospitalization for infection treatment or transfusion occur in <0.4% of patients under the standard protocol?. Failed or incomplete medication  may require a suction procedure to complete.    WHAT DO I DO?    You took one tablet of 200 mg mifepristone today during your visit or will take it at home as directed by your provider.   After taking the mifepristone, use the misoprostol.  There are two ways to take misoprostol: vaginal or buccal (between cheek and gum in your mouth).   Vaginal Use of Misoprostol (may be inserted immediately or up to 48 hrs after mifepristone)   Wash your hands and lie down. Place the 4 misoprostol tablets one at a time up into the vagina as far as you can using your finger. It doesn t matter where in the vagina you put the pills. Each misoprostol pill contains 200 micrograms.    If your provider has recommended a second dose of misoprostol, repeat the process 4 hours later with an additional 4 tablets of misoprostol. If you have started to bleed, you can put the pills in your cheeks (between your cheek and your gums), instead of your vagina, for 30 minutes. See  Buccal Use of Misoprostol  below.    Buccal Use of Misoprostol (should be taken between 24-48 hrs after mifepristone)   Place 2 tablets of misoprostol in each cheek (between your cheek and your gums), four tablets total.    Leave them in your cheeks for 30 minutes to dissolve.    After 30 minutes swallow the pills with a large glass of water.   If your provider has recommended a second dose of  misoprostol, repeat steps i. through iii. above after 4 hours.    You may take ibuprofen (800 mg total) and/or acetaminophen (650mg) by mouth one hour before using the misoprostol. This may help with cramping. See further information on pain management below.       WHAT HAPPENS NEXT?   Vaginal bleeding with clots is an expected part of this process. The cramps and bleeding may be stronger than your normal period. The cramps and heavy bleeding usually start 2 to 4 hours after you use the misoprostol pills. This heavy bleeding means the pills are working. After the pregnancy tissue passes, the bleeding will slow down and get lighter and lighter. It is normal to pass small clots and sometimes the bleeding may seem to increase when you get up suddenly or go to the toilet. Bleeding may continue on and off for up to 4 weeks.     Lower abdominal cramping pain, especially in the middle of your lower abdomen can occur any time after you take the misoprostol.? Cramping may be similar or sometimes much greater than with a menstrual period and can last for a couple of days. If you continue to have pain and cramps after taking the ibuprofen (as directed above), then you can use additional pain medicine such as acetaminophen (Tylenol).?   Nausea, diarrhea: These symptoms should get better a few hours after using the pills and should not last longer than 24 hours.    Fever and chills: You may have fever and chills the day you take the misoprostol. It is NOT normal to have a fever after that. Call us right away if you do. It could be a sign that you are getting an infection.   You will receive a phone call from a clinic nurse within a week of your visit.    You can expect a period in 4 to 8 weeks after using mifepristone and misoprostol but this varies quite a bit depending upon a person s normal menstrual cycle.      WHAT CAN I TAKE FOR PAIN, NAUSEA, DIARRHEA?    Pain:    Take ibuprofen (Motrin or Advil) 800 mg every 8 hours as  needed for pain. Take this with food to avoid an upset stomach. You may also alternate this with acetaminophen (Tylenol) 650mg every 6 hours for added pain control.   Comfort: A hot pack may help with cramps. You can also drink some hot tea. Rest in a soothing place.    Nausea   Take ondansetron or promethazine as needed for nausea and vomiting as needed for nausea and vomiting if prescribed.   Diarrhea   Take Loperamide as needed for diarrhea. Take 2 capsules after the first loose bowel movement. Can take 1 capsule after each additional loose stool. Do not take more than 8 capsules in a day.     WHEN SHOULD I CALL MY HEALTHCARE PROVIDER?    Your bleeding soaks through more than 2 maxi pads per hour for 2 hours in a row   You do not bleed within 24 hours after taking the misoprostol   You continue to feel sick more than 24 hours after taking the misoprostol:   Fever on an oral thermometer of 100.4 degrees Fahrenheit, severe stomach area (abdominal) pain, weakness, nausea, vomiting, or diarrhea     The clinic phone is answered 24 hours per day. If it is after clinic hours, leave a message with the answering service and a health care provider will call you back. Please call if you have any questions, think something is going wrong, or think you have an emergency. If you do not receive a call back within an hour, go to the nearest emergency room.              FEELINGS AFTER ENDING PREGNANCY    People have many different feelings after using the medications to end a pregnancy. The most common emotion people report is relief, but people can also feel many other emotions. If you think your emotions are not what they should be, please talk to us.        References:   American College of Obstetricians and Gynecologists  Committee on Practice Bulletins--Gynecology, Society of Family Planning. Medication  Up to 70 Days of Gestation: ACOG Practice Bulletin, Number 225. Obstet Gynecol. 2020;136(4):e31-e47. doi:  10.1097/AOG.5328301550474337.    Reproductive Health Access Project. Medication  Aftercare Instructions (vaginal miso). May 2022. Available at: [email or web address]://[email or web address]/wp-content/[email or web address]   Reproductive Health Access Project. Medication  Aftercare Instructions (buccal miso). May 2022. Available at: [email or web address]/wp-content/[email or web address]

## 2025-07-01 ENCOUNTER — RESULTS FOLLOW-UP (OUTPATIENT)
Dept: MIDWIFE SERVICES | Facility: CLINIC | Age: 25
End: 2025-07-01

## 2025-07-02 ENCOUNTER — OFFICE VISIT (OUTPATIENT)
Dept: OBGYN | Facility: CLINIC | Age: 25
End: 2025-07-02
Payer: COMMERCIAL

## 2025-07-02 VITALS — SYSTOLIC BLOOD PRESSURE: 110 MMHG | BODY MASS INDEX: 35.75 KG/M2 | WEIGHT: 200.2 LBS | DIASTOLIC BLOOD PRESSURE: 62 MMHG

## 2025-07-02 DIAGNOSIS — Z33.2 TERMINATION OF PREGNANCY (FETUS): Primary | ICD-10-CM

## 2025-07-02 LAB — HCG INTACT+B SERPL-ACNC: ABNORMAL MIU/ML

## 2025-07-02 PROCEDURE — 99213 OFFICE O/P EST LOW 20 MIN: CPT

## 2025-07-02 PROCEDURE — 3074F SYST BP LT 130 MM HG: CPT

## 2025-07-02 PROCEDURE — 84702 CHORIONIC GONADOTROPIN TEST: CPT

## 2025-07-02 PROCEDURE — 3078F DIAST BP <80 MM HG: CPT

## 2025-07-02 PROCEDURE — 36415 COLL VENOUS BLD VENIPUNCTURE: CPT

## 2025-07-02 PROCEDURE — S0190 MIFEPRISTONE, ORAL, 200 MG: HCPCS

## 2025-07-02 RX ORDER — IBUPROFEN 800 MG/1
800 TABLET, FILM COATED ORAL EVERY 8 HOURS PRN
Qty: 21 TABLET | Refills: 0 | Status: SHIPPED | OUTPATIENT
Start: 2025-07-02

## 2025-07-02 RX ORDER — ONDANSETRON 4 MG/1
4 TABLET, FILM COATED ORAL EVERY 6 HOURS PRN
Qty: 10 TABLET | Refills: 0 | Status: SHIPPED | OUTPATIENT
Start: 2025-07-02

## 2025-07-02 RX ORDER — MISOPROSTOL 200 UG/1
800 TABLET ORAL ONCE
Qty: 4 TABLET | Refills: 1 | Status: SHIPPED | OUTPATIENT
Start: 2025-07-02 | End: 2025-07-02

## 2025-07-02 RX ORDER — OXYCODONE HYDROCHLORIDE 5 MG/1
5 TABLET ORAL EVERY 6 HOURS PRN
Qty: 12 TABLET | Refills: 0 | Status: SHIPPED | OUTPATIENT
Start: 2025-07-02 | End: 2025-07-05

## 2025-07-02 RX ORDER — ACETAMINOPHEN 325 MG/1
325 TABLET ORAL EVERY 6 HOURS PRN
Qty: 56 TABLET | Refills: 0 | Status: SHIPPED | OUTPATIENT
Start: 2025-07-02

## 2025-07-02 RX ORDER — MIFEPRISTONE 200 MG/1
200 TABLET ORAL ONCE
Status: COMPLETED | OUTPATIENT
Start: 2025-07-02 | End: 2025-07-02

## 2025-07-02 RX ADMIN — MIFEPRISTONE 200 MG: 200 TABLET ORAL at 13:34

## 2025-07-03 ENCOUNTER — RESULTS FOLLOW-UP (OUTPATIENT)
Dept: OBGYN | Facility: CLINIC | Age: 25
End: 2025-07-03

## 2025-07-08 ENCOUNTER — MEDICAL CORRESPONDENCE (OUTPATIENT)
Dept: HEALTH INFORMATION MANAGEMENT | Facility: CLINIC | Age: 25
End: 2025-07-08
Payer: COMMERCIAL

## 2025-07-09 ASSESSMENT — ANXIETY QUESTIONNAIRES
7. FEELING AFRAID AS IF SOMETHING AWFUL MIGHT HAPPEN: SEVERAL DAYS
1. FEELING NERVOUS, ANXIOUS, OR ON EDGE: SEVERAL DAYS
7. FEELING AFRAID AS IF SOMETHING AWFUL MIGHT HAPPEN: SEVERAL DAYS
2. NOT BEING ABLE TO STOP OR CONTROL WORRYING: SEVERAL DAYS
4. TROUBLE RELAXING: SEVERAL DAYS
GAD7 TOTAL SCORE: 6
IF YOU CHECKED OFF ANY PROBLEMS ON THIS QUESTIONNAIRE, HOW DIFFICULT HAVE THESE PROBLEMS MADE IT FOR YOU TO DO YOUR WORK, TAKE CARE OF THINGS AT HOME, OR GET ALONG WITH OTHER PEOPLE: NOT DIFFICULT AT ALL
GAD7 TOTAL SCORE: 6
GAD7 TOTAL SCORE: 6
5. BEING SO RESTLESS THAT IT IS HARD TO SIT STILL: NOT AT ALL
8. IF YOU CHECKED OFF ANY PROBLEMS, HOW DIFFICULT HAVE THESE MADE IT FOR YOU TO DO YOUR WORK, TAKE CARE OF THINGS AT HOME, OR GET ALONG WITH OTHER PEOPLE?: NOT DIFFICULT AT ALL
3. WORRYING TOO MUCH ABOUT DIFFERENT THINGS: SEVERAL DAYS
6. BECOMING EASILY ANNOYED OR IRRITABLE: SEVERAL DAYS

## 2025-07-09 ASSESSMENT — EDINBURGH POSTNATAL DEPRESSION SCALE (EPDS)
I HAVE BEEN ABLE TO LAUGH AND SEE THE FUNNY SIDE OF THINGS: NOT QUITE SO MUCH NOW
I HAVE BLAMED MYSELF UNNECESSARILY WHEN THINGS WENT WRONG: NO, NEVER
THE THOUGHT OF HARMING MYSELF HAS OCCURRED TO ME: NEVER
I HAVE FELT SAD OR MISERABLE: YES, QUITE OFTEN
I HAVE BEEN ANXIOUS OR WORRIED FOR NO GOOD REASON: HARDLY EVER
I HAVE BEEN SO UNHAPPY THAT I HAVE HAD DIFFICULTY SLEEPING: NOT VERY OFTEN
I HAVE LOOKED FORWARD WITH ENJOYMENT TO THINGS: RATHER LESS THAN I USED TO
I HAVE FELT SCARED OR PANICKY FOR NO GOOD REASON: NO, NOT MUCH
THINGS HAVE BEEN GETTING ON TOP OF ME: NO, MOST OF THE TIME I HAVE COPED QUITE WELL
TOTAL SCORE: 9
I HAVE BEEN SO UNHAPPY THAT I HAVE BEEN CRYING: ONLY OCCASIONALLY

## 2025-07-09 ASSESSMENT — PATIENT HEALTH QUESTIONNAIRE - PHQ9
SUM OF ALL RESPONSES TO PHQ QUESTIONS 1-9: 7
10. IF YOU CHECKED OFF ANY PROBLEMS, HOW DIFFICULT HAVE THESE PROBLEMS MADE IT FOR YOU TO DO YOUR WORK, TAKE CARE OF THINGS AT HOME, OR GET ALONG WITH OTHER PEOPLE: SOMEWHAT DIFFICULT
SUM OF ALL RESPONSES TO PHQ QUESTIONS 1-9: 7

## 2025-07-10 ENCOUNTER — VIRTUAL VISIT (OUTPATIENT)
Dept: PSYCHIATRY | Facility: CLINIC | Age: 25
End: 2025-07-10
Payer: COMMERCIAL
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
## 2025-07-14 ENCOUNTER — LAB (OUTPATIENT)
Dept: LAB | Facility: CLINIC | Age: 25
End: 2025-07-14
Payer: COMMERCIAL

## 2025-07-14 DIAGNOSIS — Z33.2 TERMINATION OF PREGNANCY (FETUS): ICD-10-CM

## 2025-07-14 LAB — HCG INTACT+B SERPL-ACNC: 7683 MIU/ML

## 2025-07-14 PROCEDURE — 84702 CHORIONIC GONADOTROPIN TEST: CPT

## 2025-07-14 PROCEDURE — 36415 COLL VENOUS BLD VENIPUNCTURE: CPT

## 2025-07-17 ENCOUNTER — LAB (OUTPATIENT)
Dept: LAB | Facility: CLINIC | Age: 25
End: 2025-07-17
Payer: COMMERCIAL

## 2025-07-17 DIAGNOSIS — Z33.2 TERMINATION OF PREGNANCY (FETUS): ICD-10-CM

## 2025-07-17 LAB — HCG INTACT+B SERPL-ACNC: 5897 MIU/ML

## 2025-07-28 ENCOUNTER — VIRTUAL VISIT (OUTPATIENT)
Dept: PSYCHIATRY | Facility: CLINIC | Age: 25
End: 2025-07-28
Payer: COMMERCIAL

## 2025-07-28 DIAGNOSIS — F43.10 PTSD (POST-TRAUMATIC STRESS DISORDER): Primary | ICD-10-CM

## 2025-07-28 DIAGNOSIS — F41.1 GENERALIZED ANXIETY DISORDER: ICD-10-CM

## 2025-07-28 DIAGNOSIS — F41.0 PANIC DISORDER WITHOUT AGORAPHOBIA: ICD-10-CM

## 2025-07-28 DIAGNOSIS — F39 UNSPECIFIED MOOD (AFFECTIVE) DISORDER: ICD-10-CM

## 2025-07-28 PROCEDURE — 90837 PSYTX W PT 60 MINUTES: CPT | Mod: 95

## 2025-07-30 ENCOUNTER — LAB (OUTPATIENT)
Dept: LAB | Facility: CLINIC | Age: 25
End: 2025-07-30
Payer: COMMERCIAL

## 2025-07-30 DIAGNOSIS — Z33.2 TERMINATION OF PREGNANCY (FETUS): ICD-10-CM

## 2025-07-30 LAB — HCG INTACT+B SERPL-ACNC: 885 MIU/ML

## 2025-07-30 PROCEDURE — 36415 COLL VENOUS BLD VENIPUNCTURE: CPT

## 2025-07-30 PROCEDURE — 84702 CHORIONIC GONADOTROPIN TEST: CPT

## 2025-08-04 ENCOUNTER — VIRTUAL VISIT (OUTPATIENT)
Dept: PSYCHIATRY | Facility: CLINIC | Age: 25
End: 2025-08-04
Payer: COMMERCIAL

## 2025-08-04 DIAGNOSIS — F43.10 PTSD (POST-TRAUMATIC STRESS DISORDER): Primary | ICD-10-CM

## 2025-08-04 DIAGNOSIS — F39 UNSPECIFIED MOOD (AFFECTIVE) DISORDER: ICD-10-CM

## 2025-08-04 DIAGNOSIS — F41.0 PANIC DISORDER WITHOUT AGORAPHOBIA: ICD-10-CM

## 2025-08-04 DIAGNOSIS — F41.1 GENERALIZED ANXIETY DISORDER: ICD-10-CM

## 2025-08-04 PROCEDURE — 90837 PSYTX W PT 60 MINUTES: CPT | Mod: 95

## 2025-08-11 ENCOUNTER — VIRTUAL VISIT (OUTPATIENT)
Dept: PSYCHIATRY | Facility: CLINIC | Age: 25
End: 2025-08-11
Payer: COMMERCIAL

## 2025-08-11 DIAGNOSIS — F41.0 PANIC DISORDER WITHOUT AGORAPHOBIA: ICD-10-CM

## 2025-08-11 DIAGNOSIS — F43.10 PTSD (POST-TRAUMATIC STRESS DISORDER): Primary | ICD-10-CM

## 2025-08-11 DIAGNOSIS — F39 UNSPECIFIED MOOD (AFFECTIVE) DISORDER: ICD-10-CM

## 2025-08-11 DIAGNOSIS — F41.1 GENERALIZED ANXIETY DISORDER: ICD-10-CM

## 2025-08-11 PROCEDURE — 90837 PSYTX W PT 60 MINUTES: CPT | Mod: 95

## 2025-08-25 ENCOUNTER — VIRTUAL VISIT (OUTPATIENT)
Dept: PSYCHIATRY | Facility: CLINIC | Age: 25
End: 2025-08-25
Payer: COMMERCIAL

## 2025-08-25 ENCOUNTER — BEH TREATMENT PLAN (OUTPATIENT)
Dept: PSYCHIATRY | Facility: CLINIC | Age: 25
End: 2025-08-25
Payer: COMMERCIAL

## 2025-08-25 DIAGNOSIS — F43.10 PTSD (POST-TRAUMATIC STRESS DISORDER): Primary | ICD-10-CM

## 2025-08-25 DIAGNOSIS — F41.0 PANIC DISORDER WITHOUT AGORAPHOBIA: ICD-10-CM

## 2025-08-25 DIAGNOSIS — F41.1 GENERALIZED ANXIETY DISORDER: ICD-10-CM

## 2025-08-25 DIAGNOSIS — F39 UNSPECIFIED MOOD (AFFECTIVE) DISORDER: ICD-10-CM

## 2025-08-25 PROCEDURE — 90837 PSYTX W PT 60 MINUTES: CPT | Mod: 95
